# Patient Record
Sex: MALE | Race: WHITE | NOT HISPANIC OR LATINO | Employment: OTHER | URBAN - METROPOLITAN AREA
[De-identification: names, ages, dates, MRNs, and addresses within clinical notes are randomized per-mention and may not be internally consistent; named-entity substitution may affect disease eponyms.]

---

## 2017-08-16 ENCOUNTER — ALLSCRIPTS OFFICE VISIT (OUTPATIENT)
Dept: OTHER | Facility: OTHER | Age: 77
End: 2017-08-16

## 2018-01-13 VITALS
WEIGHT: 238 LBS | BODY MASS INDEX: 32.23 KG/M2 | SYSTOLIC BLOOD PRESSURE: 120 MMHG | DIASTOLIC BLOOD PRESSURE: 70 MMHG | HEART RATE: 54 BPM | HEIGHT: 72 IN

## 2018-08-15 ENCOUNTER — OFFICE VISIT (OUTPATIENT)
Dept: CARDIOLOGY CLINIC | Facility: CLINIC | Age: 78
End: 2018-08-15
Payer: MEDICARE

## 2018-08-15 VITALS
DIASTOLIC BLOOD PRESSURE: 76 MMHG | WEIGHT: 245.6 LBS | SYSTOLIC BLOOD PRESSURE: 148 MMHG | HEIGHT: 72 IN | HEART RATE: 74 BPM | BODY MASS INDEX: 33.26 KG/M2

## 2018-08-15 DIAGNOSIS — I10 ESSENTIAL (PRIMARY) HYPERTENSION: Primary | ICD-10-CM

## 2018-08-15 DIAGNOSIS — I25.10 CORONARY ARTERIOSCLEROSIS: ICD-10-CM

## 2018-08-15 DIAGNOSIS — E78.00 PURE HYPERCHOLESTEROLEMIA: ICD-10-CM

## 2018-08-15 PROCEDURE — 99214 OFFICE O/P EST MOD 30 MIN: CPT | Performed by: INTERNAL MEDICINE

## 2018-08-15 PROCEDURE — 93000 ELECTROCARDIOGRAM COMPLETE: CPT | Performed by: INTERNAL MEDICINE

## 2018-08-15 RX ORDER — UMECLIDINIUM BROMIDE AND VILANTEROL TRIFENATATE 62.5; 25 UG/1; UG/1
POWDER RESPIRATORY (INHALATION)
Refills: 0 | COMMUNITY
Start: 2018-07-05

## 2018-08-15 NOTE — PROGRESS NOTES
Cardiology Follow Up    Deborah Mojica  1940  [id number]  [address] [facility]  [address] [place] [address]  [phone]    1  Essential (primary) hypertension  POCT ECG   2  Pure hypercholesterolemia     3  Coronary arteriosclerosis         Interval History:  The patient is here for a follow-up visit  He was last seen by me in August last year  His most recent echocardiogram was done in September last year and demonstrated preserved LV systolic function with mild LVH and mild biatrial cavity enlargement  Patient has a history of an inferior wall myocardial infarction in 2008 with recanalization of the vessel at that time and placement of two drug-eluting stents  Stress test done November 2013 demonstrated no evidence of prognostically important ischemia  Patient's blood pressure today is 148/76 with a pulse of 74  EKG demonstrates sinus rhythm with a right bundle branch block and left anterior hemiblock  The left anterior hemiblock is new compared to an EKG done August 3, 2016  He has been feeling well  He has had no chest pain or significant dyspnea  There is no problem list on file for this patient  Past Medical History:   Diagnosis Date    Arthritis     Cardiac disease     heart attck 10 years ago    COPD [facility]chronic obstructive pulmonary disease) ([facility])      Social History     Social History    Marital status: Single     Spouse name: N/A    Number of children: N/A    Years of education: N/A     Occupational History    Not on file  Social History Main Topics    Smoking status: Former Smoker    Smokeless tobacco: Not on file    Alcohol use Yes    Drug use: No    Sexual activity: Not on file     Other Topics Concern    Not on file     Social History Narrative    No narrative on file      No family history on file    Past Surgical History:   Procedure Laterality Date    CARDIAC SURGERY stent placement    EYE SURGERY      bilateral cataract surgery    VASCULAR SURGERY      legs       Current Outpatient Prescriptions:     ANORO ELLIPTA 62 5-25 MCG/INH inhaler, INHALE 1 PUFF BY MOUTH ONCE A DAY - RINSE MOUTH AFTER USE, Disp: , Rfl: 0    aspirin 325 mg tablet, Take 325 mg by mouth daily  , Disp: , Rfl:     ezetimibe (ZETIA) 10 mg tablet, Take 10 mg by mouth 3 (three) times a week , Disp: , Rfl:     Fish Oil-Krill Oil (KRILL OIL PLUS) CAPS, Take by mouth, Disp: , Rfl:     metoprolol tartrate (LOPRESSOR) 25 mg tablet, Take 25 mg by mouth daily  , Disp: , Rfl:     TiZANidine (ZANAFLEX) 2 MG capsule, Take 2 mg by mouth 2 (two) times a day , Disp: , Rfl:   Allergies   Allergen Reactions    Niacin Other (See Comments)       Labs:not applicable  Imaging: No results found  Review of Systems:  Review of Systems   All other systems reviewed and are negative  Physical Exam:  Physical Exam   Constitutional: He is oriented to person, place, and time  He appears well-developed and well-nourished  HENT:   Head: Normocephalic and atraumatic  Eyes: Conjunctivae are normal  Pupils are equal, round, and reactive to light  Neck: Normal range of motion  Neck supple  Cardiovascular: Normal rate and normal heart sounds  Pulmonary/Chest: Effort normal and breath sounds normal    Neurological: He is alert and oriented to person, place, and time  Skin: Skin is warm and dry  Psychiatric: He has a normal mood and affect  Vitals reviewed  Discussion/Summary:I will continue the patient's present medical regimen  Patient appears well compensated  I have asked the patient to call if there is a problem in the interim otherwise I will see the patient in one years time

## 2018-08-27 ENCOUNTER — APPOINTMENT (EMERGENCY)
Dept: RADIOLOGY | Facility: HOSPITAL | Age: 78
DRG: 872 | End: 2018-08-27
Payer: MEDICARE

## 2018-08-27 ENCOUNTER — HOSPITAL ENCOUNTER (INPATIENT)
Facility: HOSPITAL | Age: 78
LOS: 2 days | Discharge: HOME | DRG: 872 | End: 2018-08-29
Attending: EMERGENCY MEDICINE | Admitting: INTERNAL MEDICINE
Payer: MEDICARE

## 2018-08-27 DIAGNOSIS — A41.9 SEVERE SEPSIS (HCC): Primary | ICD-10-CM

## 2018-08-27 DIAGNOSIS — R65.20 SEVERE SEPSIS (HCC): Primary | ICD-10-CM

## 2018-08-27 DIAGNOSIS — Z87.09 H/O ASBESTOSIS: ICD-10-CM

## 2018-08-27 PROBLEM — E78.49 OTHER HYPERLIPIDEMIA: Status: ACTIVE | Noted: 2018-08-27

## 2018-08-27 PROBLEM — J44.9 COPD (CHRONIC OBSTRUCTIVE PULMONARY DISEASE) (HCC): Status: ACTIVE | Noted: 2018-08-27

## 2018-08-27 PROBLEM — E78.00 PURE HYPERCHOLESTEROLEMIA: Status: ACTIVE | Noted: 2018-08-27

## 2018-08-27 PROBLEM — I25.10 ASCVD (ARTERIOSCLEROTIC CARDIOVASCULAR DISEASE): Status: ACTIVE | Noted: 2018-08-27

## 2018-08-27 PROBLEM — I10 HYPERTENSION: Status: ACTIVE | Noted: 2018-08-27

## 2018-08-27 LAB
ALBUMIN SERPL BCP-MCNC: 3.3 G/DL (ref 3.5–5)
ALP SERPL-CCNC: 113 U/L (ref 46–116)
ALT SERPL W P-5'-P-CCNC: 59 U/L (ref 12–78)
ANION GAP SERPL CALCULATED.3IONS-SCNC: 8 MMOL/L (ref 4–13)
APTT PPP: 31 SECONDS (ref 24–36)
AST SERPL W P-5'-P-CCNC: 31 U/L (ref 5–45)
BACTERIA UR QL AUTO: ABNORMAL /HPF
BASOPHILS # BLD AUTO: 0.02 THOUSANDS/ΜL (ref 0–0.1)
BASOPHILS NFR BLD AUTO: 0 % (ref 0–1)
BILIRUB SERPL-MCNC: 0.7 MG/DL (ref 0.2–1)
BILIRUB UR QL STRIP: NEGATIVE
BUN SERPL-MCNC: 24 MG/DL (ref 5–25)
CALCIUM SERPL-MCNC: 9 MG/DL (ref 8.3–10.1)
CHLORIDE SERPL-SCNC: 101 MMOL/L (ref 100–108)
CLARITY UR: ABNORMAL
CO2 SERPL-SCNC: 25 MMOL/L (ref 21–32)
COARSE GRAN CASTS URNS QL MICRO: ABNORMAL /LPF
COLOR UR: ABNORMAL
CREAT SERPL-MCNC: 1.6 MG/DL (ref 0.6–1.3)
EOSINOPHIL # BLD AUTO: 0.14 THOUSAND/ΜL (ref 0–0.61)
EOSINOPHIL NFR BLD AUTO: 2 % (ref 0–6)
ERYTHROCYTE [DISTWIDTH] IN BLOOD BY AUTOMATED COUNT: 13.6 % (ref 11.6–15.1)
FINE GRAN CASTS URNS QL MICRO: ABNORMAL /LPF
GFR SERPL CREATININE-BSD FRML MDRD: 41 ML/MIN/1.73SQ M
GLUCOSE SERPL-MCNC: 151 MG/DL (ref 65–140)
GLUCOSE UR STRIP-MCNC: NEGATIVE MG/DL
HCT VFR BLD AUTO: 42.5 % (ref 36.5–49.3)
HGB BLD-MCNC: 14.2 G/DL (ref 12–17)
HGB UR QL STRIP.AUTO: NEGATIVE
HYALINE CASTS #/AREA URNS LPF: ABNORMAL /LPF
IMM GRANULOCYTES # BLD AUTO: 0.01 THOUSAND/UL (ref 0–0.2)
IMM GRANULOCYTES NFR BLD AUTO: 0 % (ref 0–2)
INR PPP: 1.24 (ref 0.86–1.17)
KETONES UR STRIP-MCNC: ABNORMAL MG/DL
LACTATE SERPL-SCNC: 1.7 MMOL/L (ref 0.5–2)
LACTATE SERPL-SCNC: 2.6 MMOL/L (ref 0.5–2)
LEUKOCYTE ESTERASE UR QL STRIP: NEGATIVE
LYMPHOCYTES # BLD AUTO: 0.92 THOUSANDS/ΜL (ref 0.6–4.47)
LYMPHOCYTES NFR BLD AUTO: 15 % (ref 14–44)
MCH RBC QN AUTO: 30.7 PG (ref 26.8–34.3)
MCHC RBC AUTO-ENTMCNC: 33.4 G/DL (ref 31.4–37.4)
MCV RBC AUTO: 92 FL (ref 82–98)
MONOCYTES # BLD AUTO: 0.96 THOUSAND/ΜL (ref 0.17–1.22)
MONOCYTES NFR BLD AUTO: 16 % (ref 4–12)
MUCOUS THREADS UR QL AUTO: ABNORMAL
NEUTROPHILS # BLD AUTO: 4.05 THOUSANDS/ΜL (ref 1.85–7.62)
NEUTS SEG NFR BLD AUTO: 67 % (ref 43–75)
NITRITE UR QL STRIP: NEGATIVE
NON-SQ EPI CELLS URNS QL MICRO: ABNORMAL /HPF
NT-PROBNP SERPL-MCNC: 836 PG/ML
PH UR STRIP.AUTO: 5.5 [PH] (ref 4.5–8)
PLATELET # BLD AUTO: 241 THOUSANDS/UL (ref 149–390)
PMV BLD AUTO: 8.7 FL (ref 8.9–12.7)
POTASSIUM SERPL-SCNC: 4.6 MMOL/L (ref 3.5–5.3)
PROT SERPL-MCNC: 7.7 G/DL (ref 6.4–8.2)
PROT UR STRIP-MCNC: ABNORMAL MG/DL
PROTHROMBIN TIME: 14.9 SECONDS (ref 11.8–14.2)
RBC # BLD AUTO: 4.62 MILLION/UL (ref 3.88–5.62)
RBC #/AREA URNS AUTO: ABNORMAL /HPF
SODIUM SERPL-SCNC: 134 MMOL/L (ref 136–145)
SP GR UR STRIP.AUTO: >=1.03 (ref 1–1.03)
TROPONIN I SERPL-MCNC: 0.04 NG/ML
UROBILINOGEN UR QL STRIP.AUTO: 1 E.U./DL
WBC # BLD AUTO: 6.1 THOUSAND/UL (ref 4.31–10.16)
WBC #/AREA URNS AUTO: ABNORMAL /HPF

## 2018-08-27 PROCEDURE — 94664 DEMO&/EVAL PT USE INHALER: CPT

## 2018-08-27 PROCEDURE — 94760 N-INVAS EAR/PLS OXIMETRY 1: CPT

## 2018-08-27 PROCEDURE — 83605 ASSAY OF LACTIC ACID: CPT | Performed by: PHYSICIAN ASSISTANT

## 2018-08-27 PROCEDURE — 85610 PROTHROMBIN TIME: CPT | Performed by: PHYSICIAN ASSISTANT

## 2018-08-27 PROCEDURE — 85730 THROMBOPLASTIN TIME PARTIAL: CPT | Performed by: PHYSICIAN ASSISTANT

## 2018-08-27 PROCEDURE — 83880 ASSAY OF NATRIURETIC PEPTIDE: CPT | Performed by: PHYSICIAN ASSISTANT

## 2018-08-27 PROCEDURE — 93005 ELECTROCARDIOGRAM TRACING: CPT

## 2018-08-27 PROCEDURE — 85025 COMPLETE CBC W/AUTO DIFF WBC: CPT | Performed by: PHYSICIAN ASSISTANT

## 2018-08-27 PROCEDURE — 84484 ASSAY OF TROPONIN QUANT: CPT | Performed by: PHYSICIAN ASSISTANT

## 2018-08-27 PROCEDURE — 94640 AIRWAY INHALATION TREATMENT: CPT

## 2018-08-27 PROCEDURE — 87040 BLOOD CULTURE FOR BACTERIA: CPT | Performed by: PHYSICIAN ASSISTANT

## 2018-08-27 PROCEDURE — 71046 X-RAY EXAM CHEST 2 VIEWS: CPT

## 2018-08-27 PROCEDURE — 81001 URINALYSIS AUTO W/SCOPE: CPT | Performed by: PHYSICIAN ASSISTANT

## 2018-08-27 PROCEDURE — 80053 COMPREHEN METABOLIC PANEL: CPT | Performed by: PHYSICIAN ASSISTANT

## 2018-08-27 PROCEDURE — 36415 COLL VENOUS BLD VENIPUNCTURE: CPT | Performed by: PHYSICIAN ASSISTANT

## 2018-08-27 PROCEDURE — 99285 EMERGENCY DEPT VISIT HI MDM: CPT

## 2018-08-27 PROCEDURE — 99223 1ST HOSP IP/OBS HIGH 75: CPT | Performed by: INTERNAL MEDICINE

## 2018-08-27 RX ORDER — ONDANSETRON 2 MG/ML
4 INJECTION INTRAMUSCULAR; INTRAVENOUS ONCE
Status: DISCONTINUED | OUTPATIENT
Start: 2018-08-27 | End: 2018-08-29 | Stop reason: HOSPADM

## 2018-08-27 RX ORDER — ACETAMINOPHEN 325 MG/1
650 TABLET ORAL EVERY 6 HOURS PRN
Status: DISCONTINUED | OUTPATIENT
Start: 2018-08-27 | End: 2018-08-29 | Stop reason: HOSPADM

## 2018-08-27 RX ORDER — FLUTICASONE FUROATE AND VILANTEROL 100; 25 UG/1; UG/1
1 POWDER RESPIRATORY (INHALATION) DAILY
Status: DISCONTINUED | OUTPATIENT
Start: 2018-08-28 | End: 2018-08-28

## 2018-08-27 RX ORDER — TEMAZEPAM 15 MG/1
15 CAPSULE ORAL
Status: DISCONTINUED | OUTPATIENT
Start: 2018-08-27 | End: 2018-08-29 | Stop reason: HOSPADM

## 2018-08-27 RX ORDER — ASPIRIN 325 MG
325 TABLET ORAL DAILY
Status: DISCONTINUED | OUTPATIENT
Start: 2018-08-28 | End: 2018-08-29 | Stop reason: HOSPADM

## 2018-08-27 RX ORDER — EZETIMIBE 10 MG/1
10 TABLET ORAL 3 TIMES WEEKLY
Status: DISCONTINUED | OUTPATIENT
Start: 2018-08-27 | End: 2018-08-29 | Stop reason: HOSPADM

## 2018-08-27 RX ORDER — SODIUM CHLORIDE 9 MG/ML
125 INJECTION, SOLUTION INTRAVENOUS CONTINUOUS
Status: DISCONTINUED | OUTPATIENT
Start: 2018-08-27 | End: 2018-08-28

## 2018-08-27 RX ADMIN — IPRATROPIUM BROMIDE 0.5 MG: 0.5 SOLUTION RESPIRATORY (INHALATION) at 17:22

## 2018-08-27 RX ADMIN — ALBUTEROL SULFATE 5 MG: 2.5 SOLUTION RESPIRATORY (INHALATION) at 17:22

## 2018-08-27 RX ADMIN — SODIUM CHLORIDE 1000 ML: 0.9 INJECTION, SOLUTION INTRAVENOUS at 17:45

## 2018-08-27 RX ADMIN — EZETIMIBE 10 MG: 10 TABLET ORAL at 20:47

## 2018-08-27 RX ADMIN — SODIUM CHLORIDE 125 ML/HR: 0.9 INJECTION, SOLUTION INTRAVENOUS at 20:15

## 2018-08-27 RX ADMIN — CEFEPIME HYDROCHLORIDE 2000 MG: 2 INJECTION, SOLUTION INTRAVENOUS at 18:17

## 2018-08-27 RX ADMIN — TEMAZEPAM 15 MG: 15 CAPSULE ORAL at 20:47

## 2018-08-27 RX ADMIN — SODIUM CHLORIDE 1000 ML: 0.9 INJECTION, SOLUTION INTRAVENOUS at 18:47

## 2018-08-27 NOTE — ED PROVIDER NOTES
History  Chief Complaint   Patient presents with    Fever - 75 years or older     Patient presents to the Er stating he started with nausea, fever, and SOB last night   Shortness of Breath       65 yo M with history of COPD, MI x 10 years ago with stent, h/o asbestosis, who presents today for evaluation of fever  States 4 days ago he began to feel generally weak and tired  On Saturday evening he began to feel nauseous  He went to his PCP and had a fever of 101 9 and his PCP advised he come to the ED for evaluation  He went home, took 2 tylenol and came to the ED  His family at bedside reports he is more SOB but patient denies any coughing, SOB, chest pain or tightness  He denies headache, dizziness, lightheadedness, abdominal pain, vomiting, diarrhea, dysuria, frequency, or urgency  He lives at home  Denies sick contacts  No recent travel  No recent hospitalizations  He has no other complaints at this time  Prior to Admission Medications   Prescriptions Last Dose Informant Patient Reported? Taking? ANORO ELLIPTA 62 5-25 MCG/INH inhaler   Yes No   Sig: INHALE 1 PUFF BY MOUTH ONCE A DAY - RINSE MOUTH AFTER USE   Fish Oil-Krill Oil (KRILL OIL PLUS) CAPS   Yes No   Sig: Take by mouth   aspirin 325 mg tablet   Yes No   Sig: Take 325 mg by mouth daily  ezetimibe (ZETIA) 10 mg tablet   Yes No   Sig: Take 10 mg by mouth 3 (three) times a week  metoprolol tartrate (LOPRESSOR) 25 mg tablet  Self Yes No   Sig: Take 25 mg by mouth daily        Facility-Administered Medications: None       Past Medical History:   Diagnosis Date    Arthritis     Cardiac disease     heart attck 10 years ago    COPD (chronic obstructive pulmonary disease) ([facility] [address] )        Past Surgical History:   Procedure Laterality Date    CARDIAC SURGERY      stent placement    EYE SURGERY      bilateral cataract surgery    VASCULAR SURGERY      legs       History reviewed  No pertinent family history    I have reviewed and agree with the history as documented  Social History   Substance Use Topics    Smoking status: Former Smoker    Smokeless tobacco: Never Used    Alcohol use Yes      Comment: occasional        Review of Systems   Constitutional: Positive for fatigue and fever  Negative for chills  HENT: Negative for congestion, ear discharge, ear pain, rhinorrhea and sore throat  Respiratory: Positive for shortness of breath  Negative for cough and wheezing  Cardiovascular: Negative for chest pain and leg swelling  Gastrointestinal: Positive for nausea  Negative for abdominal pain, diarrhea and vomiting  Genitourinary: Negative for dysuria, frequency, hematuria and urgency  Skin: Negative for rash  Neurological: Negative for dizziness, weakness, light-headedness, numbness and headaches  Physical Exam  Physical Exam   Constitutional: He is oriented to person, place, and time  He appears well-developed and well-nourished  Non-toxic appearance  He does not have a sickly appearance  He does not appear ill  No distress  Able to speak in short phrases   HENT:   Head: Normocephalic and atraumatic  Right Ear: Hearing and external ear normal    Left Ear: Hearing and external ear normal    Nose: Nose normal    Mouth/Throat: Oropharynx is clear and moist    Eyes: Conjunctivae and EOM are normal  Pupils are equal, round, and reactive to light  Neck: Normal range of motion  Neck supple  Cardiovascular: Normal rate, regular rhythm and normal heart sounds  Exam reveals no gallop and no friction rub  No murmur heard  Pulmonary/Chest: Effort normal  No respiratory distress  He has decreased breath sounds (throughout)  He has wheezes (end expiratory, bases)  He has no rhonchi  He has no rales  Abdominal: Soft  Normal appearance  There is no tenderness  Musculoskeletal: Normal range of motion  Right lower leg: He exhibits no swelling and no edema  Left lower leg: He exhibits no swelling and no edema  Chronic venous stasis changes bilaterally to lower extremities without cellulitis   Neurological: He is alert and oriented to person, place, and time  Gait normal  GCS eye subscore is 4  GCS verbal subscore is 5  GCS motor subscore is 6  Skin: Skin is warm and dry  Capillary refill takes less than 2 seconds  No rash noted  He is not diaphoretic  Psychiatric: He has a normal mood and affect  Nursing note and vitals reviewed        Vital Signs  ED Triage Vitals [08/27/18 1500]   Temperature Pulse Respirations Blood Pressure SpO2   (!) 97 3 °F (36 3 °C) 85 (!) 24 112/63 93 %      Temp Source Heart Rate Source Patient Position - Orthostatic VS BP Location FiO2 (%)   Temporal Monitor Sitting Right arm --      Pain Score       No Pain           Vitals:    08/27/18 1800 08/27/18 1815 08/27/18 1830 08/1940   BP: 126/65 116/59 111/55 163/75   Pulse: 96 93 89 91   Patient Position - Orthostatic VS:           Visual Acuity      ED Medications  Medications   aspirin tablet 325 mg (not administered)   ezetimibe (ZETIA) tablet 10 mg (10 mg Oral Given 8/27/18 2047)   metoprolol tartrate (LOPRESSOR) tablet 25 mg (not administered)   sodium chloride 0 9 % infusion (125 mL/hr Intravenous New Bag 8/27/18 2015)   enoxaparin (LOVENOX) subcutaneous injection 40 mg (not administered)   cefepime (MAXIPIME) IVPB (premix) 1,000 mg (not administered)   acetaminophen (TYLENOL) tablet 650 mg (not administered)   ondansetron (ZOFRAN) injection 4 mg (4 mg Intravenous Not Given 8/27/18 2048)   temazepam (RESTORIL) capsule 15 mg (15 mg Oral Given 8/27/18 2047)   fluticasone-vilanterol (BREO ELLIPTA) 100-25 mcg/inh inhaler 1 puff (not administered)   albuterol inhalation solution 5 mg (5 mg Nebulization Given 8/27/18 1722)   ipratropium (ATROVENT) 0 02 % inhalation solution 0 5 mg (0 5 mg Nebulization Given 8/27/18 1722)   sodium chloride 0 9 % bolus 1,000 mL (0 mL Intravenous Stopped 8/27/18 1846)   sodium chloride 0 9 % bolus 1,000 mL (1,000 mL Intravenous New Bag 8/27/18 1847)   cefepime (MAXIPIME) 2 g/50 mL dextrose IVPB (0 mg Intravenous Stopped 8/27/18 1847)       Diagnostic Studies  Results Reviewed     Procedure Component Value Units Date/Time    Lactic acid x2 [[id number]]  (Normal) Collected:  08/27/18 1913    Lab Status:  Final result Specimen:  Blood from Arm, Right Updated:  08/1940     LACTIC ACID 1 7 mmol/L     Narrative:         Result may be elevated if tourniquet was used during collection  Urine Microscopic [[id number]]  (Abnormal) Collected:  08/27/18 1812    Lab Status:  Final result Specimen:  Urine from Urine, Clean Catch Updated:  08/27/18 1828     RBC, UA 2-4 (A) /hpf      WBC, UA 4-10 (A) /hpf      Epithelial Cells Moderate (A) /hpf      Bacteria, UA Occasional /hpf      Hyaline Casts, UA 30-50 (A) /lpf      Fine granular casts 1-2 /lpf      COARSE GRANULAR CASTS 0-3 /lpf      MUCOUS THREADS Innumerable (A)    UA w Reflex to Microscopic w Reflex to Culture [[id number]]  (Abnormal) Collected:  08/27/18 1812    Lab Status:  Final result Specimen:  Urine from Urine, Clean Catch Updated:  08/27/18 1823     Color, UA Nickie     Clarity, UA Hazy     Specific Gravity, UA >=1 030     pH, UA 5 5     Leukocytes, UA Negative     Nitrite, UA Negative     Protein,  (2+) (A) mg/dl      Glucose, UA Negative mg/dl      Ketones, UA Trace (A) mg/dl      Urobilinogen, UA 1 0 E U /dl      Bilirubin, UA Negative     Blood, UA Negative    B-type natriuretic peptide [[id number]]  (Abnormal) Collected:  08/27/18 1701    Lab Status:  Final result Specimen:  Blood from Arm, Left Updated:  08/27/18 1815     NT-proBNP 836 (H) pg/mL     Lactic acid x2 [[id number]]  (Abnormal) Collected:  08/27/18 1701    Lab Status:  Final result Specimen:  Blood from Arm, Left Updated:  08/27/18 1742     LACTIC ACID 2 6 (HH) mmol/L     Narrative:         Result may be elevated if tourniquet was used during collection      Comprehensive metabolic panel [[id number]] (Abnormal) Collected:  08/27/18 1701    Lab Status:  Final result Specimen:  Blood from Arm, Left Updated:  08/27/18 1732     Sodium 134 (L) mmol/L      Potassium 4 6 mmol/L      Chloride 101 mmol/L      CO2 25 mmol/L      ANION GAP 8 mmol/L      BUN 24 mg/dL      Creatinine 1 60 (H) mg/dL      Glucose 151 (H) mg/dL      Calcium 9 0 mg/dL      AST 31 U/L      ALT 59 U/L      Alkaline Phosphatase 113 U/L      Total Protein 7 7 g/dL      Albumin 3 3 (L) g/dL      Total Bilirubin 0 70 mg/dL      eGFR 41 ml/min/1 73sq m     Narrative:         National Kidney Disease Education Program recommendations are as follows:  GFR calculation is accurate only with a steady state creatinine  Chronic Kidney disease less than 60 ml/min/1 73 sq  meters  Kidney failure less than 15 ml/min/1 73 sq  meters      Troponin I [[id number]]  (Normal) Collected:  08/27/18 1701    Lab Status:  Final result Specimen:  Blood from Arm, Left Updated:  08/27/18 1732     Troponin I 0 04 ng/mL     APTT [[id number]]  (Normal) Collected:  08/27/18 1701    Lab Status:  Final result Specimen:  Blood from Arm, Left Updated:  08/27/18 1729     PTT 31 seconds     Protime-INR [[id number]]  (Abnormal) Collected:  08/27/18 1701    Lab Status:  Final result Specimen:  Blood from Arm, Left Updated:  08/27/18 1729     Protime 14 9 (H) seconds      INR 1 24 (H)    CBC and differential [[id number]]  (Abnormal) Collected:  08/27/18 1701    Lab Status:  Final result Specimen:  Blood from Arm, Left Updated:  08/27/18 1714     WBC 6 10 Thousand/uL      RBC 4 62 Million/uL      Hemoglobin 14 2 g/dL      Hematocrit 42 5 %      MCV 92 fL      MCH 30 7 pg      MCHC 33 4 g/dL      RDW 13 6 %      MPV 8 7 (L) fL      Platelets 910 Thousands/uL      Neutrophils Relative 67 %      Immat GRANS % 0 %      Lymphocytes Relative 15 %      Monocytes Relative 16 (H) %      Eosinophils Relative 2 %      Basophils Relative 0 %      Neutrophils Absolute 4 05 Thousands/µL      Immature Grans Absolute 0 01 Thousand/uL      Lymphocytes Absolute 0 92 Thousands/µL      Monocytes Absolute 0 96 Thousand/µL      Eosinophils Absolute 0 14 Thousand/µL      Basophils Absolute 0 02 Thousands/µL     Blood culture #1 [[id number]] Collected:  08/27/18 1701    Lab Status: In process Specimen:  Blood from Arm, Left Updated:  08/27/18 1709    Blood culture #2 [[id number]] Collected:  08/27/18 1701    Lab Status: In process Specimen:  Blood from Arm, Left Updated:  08/27/18 1709                 XR chest 2 views   Final Result by Nick Alas MD (08/27 1755)      No acute cardiopulmonary disease  Stable bilateral calcified pleural plaques which could obscures underlying airspace disease  Workstation performed: [id number]                    Procedures  ECG 12 Lead Documentation  Date/Time: 8/27/2018 5:04 PM  Performed by: José Santoyo  Authorized by: José Santoyo     Indications / Diagnosis:  Fever, SOB  ECG reviewed by me, the ED Provider: yes (Also ED attending)    Patient location:  ED  Previous ECG:     Previous ECG:  Compared to current    Comparison ECG info:  2/10/16, sinus bradycardia, RBBB    Similarity:  No change  Interpretation:     Interpretation: normal    Rate:     ECG rate:  95    ECG rate assessment: normal    Rhythm:     Rhythm: sinus rhythm    QRS:     QRS intervals: Wide (RBBB)  Conduction:     Conduction: normal    ST segments:     ST segments:  Normal  T waves:     T waves: normal               Phone Contacts  ED Phone Contact    ED Course  ED Course as of Aug 27 2052   Mon Aug 27, 2018   1749 Sepsis alert called                          Initial Sepsis Screening     Row Name 08/27/18 1700                Is the patient's history suggestive of a new or worsening infection? (!)  Yes (Proceed)  -GR        Suspected source of infection suspect infection, source unknown  -GR        Are two or more of the following signs & symptoms of infection both present and new to the patient?         Indicate SIRS criteria Tachycardia > 90 bpm;Hyperthemia > 38 3C (100 9F)   reports temp to 101 9 at home  -GR        If the answer is yes to both questions, suspicion of sepsis is present          If severe sepsis is present AND tissue hypoperfusion perists in the hour after fluid resuscitation or lactate > 4, the patient meets criteria for SEPTIC SHOCK          Are any of the following organ dysfunction criteria present within 6 hours of suspected infection and SIRS criteria that are NOT considered to be chronic conditions? (!)  Yes  -GR        Organ dysfunction Lactate > 2 0 mmol/L  -        Date of presentation of severe sepsis 08/27/18  -        Time of presentation of severe sepsis 1749  -GR        Tissue hypoperfusion persists in the hour after crystalloid fluid administration, evidenced, by either:          Was hypotension present within one hour of the conclusion of crystalloid fluid administration? No  -GR        Date of presentation of septic shock          Time of presentation of septic shock            User Key  (r) = Recorded By, (t) = Taken By, (c) = Cosigned By    Initials Name Provider Type     Laverne Wright PA-C Physician Assistant           [facility] (last 720 hours)      Sepsis Reassess     Row Name 08/27/18 1942                   Repeat Volume Status and Tissue Perfusion Assessment Performed    Repeat Volume Status and Tissue Perfusion Assessment Performed Yes  -AH           Volume Status and Tissue Perfusion Post Fluid Resuscitation- Must Document 5 of the Following 7:    Vital Signs Reviewed (HR, RR, BP, T) Yes  -AH        Arterial Oxygen Saturation Reviewed (POx, SaO2 or SpO2) Yes (comment %)  -        Cardio Normal S1/S2; Regular rate and rhythm; No murmor; No rub or gallop  -        Pulmonary Normal effort;Clear to auscultation  -        Capillary Refill Brisk  -        Peripheral Pulses Radial;Dorsalis Pedis; Posterior Tibialis  -AH Peripheral Pulse +2  -AH        Dorsalis Pedis +2  -AH        Posterior Tibialis +2  -AH        Skin Warm;Dry  -AH        Urine output assessed Adequate  -AH           *OR*   Intensive Monitoring- Must Document One of the Following Four *:    Vital Signs Reviewed          * Central Venous Pressure (CVP or RAP)          * Central Venous Oxygen (SVO2, ScvO2 or Oxygen saturation via central catheter)          * Bedside Cardiovascular US in IVC diameter and % collapse          * Passive Leg Raise OR Crystalloid Challenge            User Key  (r) = Recorded By, (t) = Taken By, (c) = Cosigned By    Initials Name Provider Type    [address] [place] [address], PA-C Physician Assistant                MDM  Number of Diagnoses or Management Options  H/O asbestosis: new and requires workup  Severe sepsis [facility]): new and requires workup  Diagnosis management comments:   65 yo M here for evaluation of fevers, nausea, worsening SOB  Seen by PCP with temp of 101 9 and advised to come to ED  Mild expiratory wheezing throughout but no hypoxia  Labwork significant for elevated lactate and given his history of fevers with tachycardia a sepsis alert was called  Source unknown but likely pulmonary  CXR with with stable bilateral pleural plaques c/w known asbestosis  Patient given fluids and started on cefepime per SLIM  He was admitted to [facility] for further evaluation of sepsis         Amount and/or Complexity of Data Reviewed  Clinical lab tests: ordered and reviewed  Tests in the radiology section of CPT®: ordered and reviewed  Independent visualization of images, tracings, or specimens: yes    Patient Progress  Patient progress: stable    CritCare Time    Disposition  Final diagnoses:   Severe sepsis ([address] )   H/O asbestosis     Time reflects when diagnosis was documented in both MDM as applicable and the Disposition within this note     Time User Action Codes Description Comment    8/27/2018  6:09 PM Lilia Ross Add [A49 9, U88 20] Severe sepsis ([facility] [address] )     8/27/2018  6:09 PM Denise Ross Add [Z87 09] H/O asbestosis       ED Disposition     ED Disposition Condition Comment    Admit  Case was discussed with BELA and the patient's admission status was agreed to be Admission Status: inpatient status to the service of Dr Yanely Martinez   Follow-up Information    None         Current Discharge Medication List      CONTINUE these medications which have NOT CHANGED    Details   ANORO ELLIPTA 62 5-25 MCG/INH inhaler INHALE 1 PUFF BY MOUTH ONCE A DAY - RINSE MOUTH AFTER USE  Refills: 0      aspirin 325 mg tablet Take 325 mg by mouth daily  ezetimibe (ZETIA) 10 mg tablet Take 10 mg by mouth 3 (three) times a week  Fish Oil-Krill Oil (KRILL OIL PLUS) CAPS Take by mouth      metoprolol tartrate (LOPRESSOR) 25 mg tablet Take 25 mg by mouth daily             No discharge procedures on file      ED Provider  Electronically Signed by           Magda Acevedo PA-C  08/27/18 8837

## 2018-08-27 NOTE — ED NOTES
Scattered rhonchi and wheezes noted  Patient states he has a cough       Richardson Amaya RN  08/27/18 7798

## 2018-08-27 NOTE — H&P
History and Physical - Ulysses Gauze 66 y o  male MRN: [id number]  Unit/Bed#: ED 03 Encounter: [id number]    Assessment/Plan     Assessment:  Principal Problem:    Severe sepsis ([place] [address] )  Active Problems:    H/O asbestosis    ASCVD (arteriosclerotic cardiovascular disease)    Hypertension    Pure hypercholesterolemia    COPD (chronic obstructive pulmonary disease) ([place] [address] )      Plan:  Admit step-down unit  Greater than 2 midnight stay  Cultures have been ordered  Will initially give 2 L of fluid and recheck lactic acid level  Continue monitor blood pressure  Empiric cefepime, still need to collect urine    Hold vaso active medications        History of Present Illness   HPI: Ulysses Gauze is a 66y o  year old male who presents with fever and chills  Patient started with malaise 3 days ago  Two nights ago the patient had nausea but no vomiting  He has some vague abdominal discomfort  This resolved yesterday  This morning he developed fever and chills and was went to his family doctor's office and had a temperature 101 9°  He was sent directly to the emergency room  He does have perhaps slight increased cough  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 and some mild COPD  He has no nausea or vomiting today he denies any urinary symptoms  There is no skin rashes or lesions or tick exposure  Patient be admitted the [facility] stabilization    Review of Systems   Constitutional: Positive for appetite change and fatigue (For few weeks)  HENT: Negative  Respiratory: Positive for cough  Cardiovascular:        Remote inferior wall MI and revascularization   Gastrointestinal: Negative  Per HPI   Genitourinary: Negative          Historical Information   Past Medical History:   Diagnosis Date    Arthritis     Cardiac disease     heart attck 10 years ago    COPD (chronic obstructive pulmonary disease) ([place] [address] )      Past Surgical History:   Procedure Laterality Date    CARDIAC SURGERY      stent placement    EYE SURGERY      bilateral cataract surgery    VASCULAR SURGERY      legs     Social History   History   Alcohol Use    Yes     Comment: occasional     History   Drug Use No     History   Smoking Status    Former Smoker   Smokeless Tobacco    Never Used     Family History: History reviewed  No pertinent family history  Meds/Allergies      Current Facility-Administered Medications   Medication Dose Route Frequency    cefepime (MAXIPIME) 2 g/50 mL dextrose IVPB  2,000 mg Intravenous Once    sodium chloride 0 9 % bolus 1,000 mL  1,000 mL Intravenous Once    sodium chloride 0 9 % bolus 1,000 mL  1,000 mL Intravenous Once         (Not in a hospital admission)  Allergies   Allergen Reactions    Niacin Other (See Comments)       Objective   Vitals: Blood pressure 126/65, pulse 96, temperature (!) 97 3 °F (36 3 °C), temperature source Temporal, resp  rate (!) 24, height 6' (1 829 m), weight 109 kg (240 lb), SpO2 95 %  No intake or output data in the 24 hours ending 08/27/18 1809  Invasive Devices     Peripheral Intravenous Line            Peripheral IV 02/10/16 Left Hand 929 days    Peripheral IV 08/27/18 Left Wrist less than 1 day                Physical Exam   Constitutional: He is oriented to person, place, and time  He appears well-developed and well-nourished  HENT:   Head: Normocephalic and atraumatic  Left Ear: External ear normal    Mouth/Throat: No oropharyngeal exudate  Eyes: Pupils are equal, round, and reactive to light  Neck: No JVD present  No tracheal deviation present  Cardiovascular: Normal rate and regular rhythm  Pulmonary/Chest:   Few rhonchi   Abdominal: Soft  Bowel sounds are normal    Musculoskeletal: Normal range of motion  He exhibits edema (Plus one)  Venous stasis changes no obvious cellulitis   Neurological: He is alert and oriented to person, place, and time  No cranial nerve deficit  He exhibits normal muscle tone  Skin: Skin is warm and dry     Vitals reviewed  Lab Results:   Admission on 08/27/2018   Component Date Value    WBC 08/27/2018 6 10     RBC 08/27/2018 4 62     Hemoglobin 08/27/2018 14 2     Hematocrit 08/27/2018 42 5     MCV 08/27/2018 92     MCH 08/27/2018 30 7     MCHC 08/27/2018 33 4     RDW 08/27/2018 13 6     MPV 08/27/2018 8 7*    Platelets 94/79/3781 241     Neutrophils Relative 08/27/2018 67     Immat GRANS % 08/27/2018 0     Lymphocytes Relative 08/27/2018 15     Monocytes Relative 08/27/2018 16*    Eosinophils Relative 08/27/2018 2     Basophils Relative 08/27/2018 0     Neutrophils Absolute 08/27/2018 4 05     Immature Grans Absolute 08/27/2018 0 01     Lymphocytes Absolute 08/27/2018 0 92     Monocytes Absolute 08/27/2018 0 96     Eosinophils Absolute 08/27/2018 0 14     Basophils Absolute 08/27/2018 0 02     Sodium 08/27/2018 134*    Potassium 08/27/2018 4 6     Chloride 08/27/2018 101     CO2 08/27/2018 25     ANION GAP 08/27/2018 8     BUN 08/27/2018 24     Creatinine 08/27/2018 1 60*    Glucose 08/27/2018 151*    Calcium 08/27/2018 9 0     AST 08/27/2018 31     ALT 08/27/2018 59     Alkaline Phosphatase 08/27/2018 113     Total Protein 08/27/2018 7 7     Albumin 08/27/2018 3 3*    Total Bilirubin 08/27/2018 0 70     eGFR 08/27/2018 41     LACTIC ACID 08/27/2018 2 6*    Protime 08/27/2018 14 9*    INR 08/27/2018 1 24*    PTT 08/27/2018 31     Troponin I 08/27/2018 0 04      Imaging Studies: I have personally reviewed pertinent reports  EKG, Pathology, and Other Studies: I have personally reviewed pertinent reports  VTE  Prophylaxis: Algorithmic determination of appropriate prophylaxis determined  This note has been constructed using a voice recognition system         Kalpana Roberts MD

## 2018-08-27 NOTE — SEPSIS NOTE
[facility]    Sepsis Note   Zeb Landaverde 66 y o  male MRN: [id number]  Unit/Bed#: -01 Encounter: [id number]            Initial Sepsis Screening     [address] Name 08/27/18 1700                Is the patient's history suggestive of a new or worsening infection? (!)  Yes (Proceed)  -GR        Suspected source of infection suspect infection, source unknown  -GR        Are two or more of the following signs & symptoms of infection both present and new to the patient?         Indicate SIRS criteria Tachycardia > 90 bpm;Hyperthemia > 38 3C (100 9F)   reports temp to 101 9 at home  -GR        If the answer is yes to both questions, suspicion of sepsis is present          If severe sepsis is present AND tissue hypoperfusion perists in the hour after fluid resuscitation or lactate > 4, the patient meets criteria for SEPTIC SHOCK          Are any of the following organ dysfunction criteria present within 6 hours of suspected infection and SIRS criteria that are NOT considered to be chronic conditions? (!)  Yes  -GR        Organ dysfunction Lactate > 2 0 mmol/L  -GR        Date of presentation of severe sepsis 08/27/18  -GR        Time of presentation of severe sepsis 1749  -GR        Tissue hypoperfusion persists in the hour after crystalloid fluid administration, evidenced, by either:          Was hypotension present within one hour of the conclusion of crystalloid fluid administration?  No  -GR        Date of presentation of septic shock          Time of presentation of septic shock            User Key  (r) = Recorded By, (t) = Taken By, (c) = Cosigned By    Initials Name Provider Type     Yanira Trevino PA-C Physician Assistant               [facility] (last 720 hours)      Sepsis Reassess     Row Name 08/27/18 1942                   Repeat Volume Status and Tissue Perfusion Assessment Performed    Repeat Volume Status and Tissue Perfusion Assessment Performed Yes  - Volume Status and Tissue Perfusion Post Fluid Resuscitation- Must Document 5 of the Following 7:    Vital Signs Reviewed (HR, RR, BP, T) Yes  -        Arterial Oxygen Saturation Reviewed (POx, SaO2 or SpO2) Yes (comment %)  -        Cardio Normal S1/S2; Regular rate and rhythm; No murmor; No rub or gallop  -        Pulmonary Normal effort;Clear to auscultation  -        Capillary Refill Brisk  -        Peripheral Pulses Radial;Dorsalis Pedis; Posterior Tibialis  -        Peripheral Pulse +2  -AH        Dorsalis Pedis +2  -AH        Posterior Tibialis +2  -AH        Skin Warm;Dry  -        Urine output assessed Adequate  -           *OR*   Intensive Monitoring- Must Document One of the Following Four *:    Vital Signs Reviewed          * Central Venous Pressure (CVP or RAP)          * Central Venous Oxygen (SVO2, ScvO2 or Oxygen saturation via central catheter)          * Bedside Cardiovascular US in IVC diameter and % collapse          * Passive Leg Raise OR Crystalloid Challenge            User Key  (r) = Recorded By, (t) = Taken By, (c) = Cosigned By    Initials Name Provider Type    [address] [place], [place] [address]HARRY Physician Assistant

## 2018-08-28 ENCOUNTER — APPOINTMENT (INPATIENT)
Dept: RADIOLOGY | Facility: HOSPITAL | Age: 78
DRG: 872 | End: 2018-08-28
Payer: MEDICARE

## 2018-08-28 PROBLEM — N17.9 AKI (ACUTE KIDNEY INJURY) (HCC): Status: ACTIVE | Noted: 2018-08-28

## 2018-08-28 PROBLEM — E87.1 HYPONATREMIA: Status: ACTIVE | Noted: 2018-08-28

## 2018-08-28 LAB
ANION GAP SERPL CALCULATED.3IONS-SCNC: 4 MMOL/L (ref 4–13)
ATRIAL RATE: 95 BPM
BUN SERPL-MCNC: 17 MG/DL (ref 5–25)
CALCIUM SERPL-MCNC: 8.2 MG/DL (ref 8.3–10.1)
CHLORIDE SERPL-SCNC: 104 MMOL/L (ref 100–108)
CO2 SERPL-SCNC: 26 MMOL/L (ref 21–32)
CREAT SERPL-MCNC: 1.1 MG/DL (ref 0.6–1.3)
ERYTHROCYTE [DISTWIDTH] IN BLOOD BY AUTOMATED COUNT: 13.1 % (ref 11.6–15.1)
GFR SERPL CREATININE-BSD FRML MDRD: 64 ML/MIN/1.73SQ M
GLUCOSE SERPL-MCNC: 105 MG/DL (ref 65–140)
HCT VFR BLD AUTO: 38.6 % (ref 36.5–49.3)
HGB BLD-MCNC: 12.2 G/DL (ref 12–17)
MCH RBC QN AUTO: 30 PG (ref 26.8–34.3)
MCHC RBC AUTO-ENTMCNC: 31.6 G/DL (ref 31.4–37.4)
MCV RBC AUTO: 95 FL (ref 82–98)
P AXIS: 60 DEGREES
PLATELET # BLD AUTO: 188 THOUSANDS/UL (ref 149–390)
PLATELET # BLD AUTO: 222 THOUSANDS/UL (ref 149–390)
PMV BLD AUTO: 8.5 FL (ref 8.9–12.7)
PMV BLD AUTO: 8.9 FL (ref 8.9–12.7)
POTASSIUM SERPL-SCNC: 4.5 MMOL/L (ref 3.5–5.3)
PR INTERVAL: 140 MS
PROCALCITONIN SERPL-MCNC: 0.14 NG/ML
QRS AXIS: -54 DEGREES
QRSD INTERVAL: 130 MS
QT INTERVAL: 374 MS
QTC INTERVAL: 469 MS
RBC # BLD AUTO: 4.07 MILLION/UL (ref 3.88–5.62)
SODIUM SERPL-SCNC: 134 MMOL/L (ref 136–145)
T WAVE AXIS: 9 DEGREES
VENTRICULAR RATE: 95 BPM
WBC # BLD AUTO: 4.95 THOUSAND/UL (ref 4.31–10.16)

## 2018-08-28 PROCEDURE — 99232 SBSQ HOSP IP/OBS MODERATE 35: CPT | Performed by: INTERNAL MEDICINE

## 2018-08-28 PROCEDURE — 93010 ELECTROCARDIOGRAM REPORT: CPT | Performed by: INTERNAL MEDICINE

## 2018-08-28 PROCEDURE — 71046 X-RAY EXAM CHEST 2 VIEWS: CPT

## 2018-08-28 PROCEDURE — 85027 COMPLETE CBC AUTOMATED: CPT | Performed by: INTERNAL MEDICINE

## 2018-08-28 PROCEDURE — 85049 AUTOMATED PLATELET COUNT: CPT | Performed by: INTERNAL MEDICINE

## 2018-08-28 PROCEDURE — 94760 N-INVAS EAR/PLS OXIMETRY 1: CPT

## 2018-08-28 PROCEDURE — 94640 AIRWAY INHALATION TREATMENT: CPT

## 2018-08-28 PROCEDURE — 84145 PROCALCITONIN (PCT): CPT | Performed by: INTERNAL MEDICINE

## 2018-08-28 PROCEDURE — 80048 BASIC METABOLIC PNL TOTAL CA: CPT | Performed by: INTERNAL MEDICINE

## 2018-08-28 RX ORDER — FLUTICASONE FUROATE AND VILANTEROL 100; 25 UG/1; UG/1
1 POWDER RESPIRATORY (INHALATION) DAILY
Status: DISCONTINUED | OUTPATIENT
Start: 2018-08-28 | End: 2018-08-29 | Stop reason: HOSPADM

## 2018-08-28 RX ADMIN — SODIUM CHLORIDE 125 ML/HR: 0.9 INJECTION, SOLUTION INTRAVENOUS at 03:40

## 2018-08-28 RX ADMIN — TEMAZEPAM 15 MG: 15 CAPSULE ORAL at 22:49

## 2018-08-28 RX ADMIN — ENOXAPARIN SODIUM 40 MG: 100 INJECTION SUBCUTANEOUS at 09:53

## 2018-08-28 RX ADMIN — METOPROLOL TARTRATE 25 MG: 25 TABLET ORAL at 09:53

## 2018-08-28 RX ADMIN — ASPIRIN 325 MG ORAL TABLET 325 MG: 325 PILL ORAL at 09:53

## 2018-08-28 RX ADMIN — CEFEPIME HYDROCHLORIDE 1000 MG: 1 INJECTION, SOLUTION INTRAVENOUS at 06:30

## 2018-08-28 RX ADMIN — TIOTROPIUM BROMIDE 18 MCG: 18 CAPSULE ORAL; RESPIRATORY (INHALATION) at 10:17

## 2018-08-28 RX ADMIN — CEFEPIME HYDROCHLORIDE 1000 MG: 1 INJECTION, SOLUTION INTRAVENOUS at 18:14

## 2018-08-28 RX ADMIN — FLUTICASONE FUROATE AND VILANTEROL TRIFENATATE 1 PUFF: 100; 25 POWDER RESPIRATORY (INHALATION) at 10:16

## 2018-08-28 NOTE — PROGRESS NOTES
Patient had Acute kidney injury on admission due to sepsis evidenced by Cr 1 60; 1 10 treated with NSS boluses and infusion

## 2018-08-28 NOTE — ASSESSMENT & PLAN NOTE
Patient claims that he  has chronic expiratory wheezing, will add Spiriva to his Breo and will continue albuterol nebulizers    Should wheezing increase will start IV Solu-Medrol

## 2018-08-28 NOTE — ASSESSMENT & PLAN NOTE
The source for patient's severe sepsis includes possible gastrointestinal viral infection, acute bronchitis, blood culture pending to rule out bacteremia  Lactic acidosis resolved after IV hydration  Patient has no nausea only complains of cough  Will repeat chest x-ray to see if any new infiltrates developed compared to yesterday  Will stop IV fluids and will ambulate the patient  Will transfer him to [facility]

## 2018-08-28 NOTE — CASE MANAGEMENT
Initial Clinical Review    Admission: Date/Time/Statement: 8/27/18 @ 1813  Orders Placed This Encounter   Procedures    Inpatient Admission (expected length of stay for this patient is greater than two midnights)     Standing Status:   Standing     Number of Occurrences:   1     Order Specific Question:   Admitting Physician     Answer:   Rebecca Guerrero [[id number]]     Order Specific Question:   Level of Care     Answer:   Level 1 Stepdown [13]     Order Specific Question:   Estimated length of stay     Answer:   More than 2 Midnights     Order Specific Question:   Certification     Answer:   I certify that inpatient services are medically necessary for this patient for a duration of greater than two midnights  See H&P and MD Progress Notes for additional information about the patient's course of treatment  ED: Date/Time/Mode of Arrival:   ED Arrival Information     Expected Arrival Acuity Means of Arrival Escorted By Service Admission Type    - 8/27/2018 14:35 Emergent Walk-In [facility] General Medicine Emergency    Arrival Complaint    fever        Chief Complaint:   Chief Complaint   Patient presents with    Fever - 75 years or older     Patient presents to the Er stating he started with nausea, fever, and SOB last night   Shortness of Breath     History of Illness:   Aramis Rodriguez is a 66y o  year old male who presents with fever and chills  Patient started with malaise 3 days ago  Two nights ago the patient had nausea but no vomiting  He has some vague abdominal discomfort  This resolved yesterday  This morning he developed fever and chills and was went to his family doctor's office and had a temperature 101 9°  He was sent directly to the emergency room       ED Vital Signs:   ED Triage Vitals [08/27/18 1500]   Temperature Pulse Respirations Blood Pressure SpO2   (!) 97 3 °F (36 3 °C) 85 (!) 24 112/63 93 %      Temp Source Heart Rate Source Patient Position - Orthostatic VS BP Location FiO2 (%) Temporal Monitor Sitting Right arm --      Pain Score       No Pain        Wt Readings from Last 1 Encounters:   18 109 kg (239 lb 6 7 oz)     Abnormal Labs/Diagnostic Test Results:    WBC 2018 6 10     RBC 2018 4 62     Hemoglobin 2018 14 2     Hematocrit 2018 42 5       Sodium 2018 134*    Potassium 2018 4 6     Chloride 2018 101     CO2 2018 25     ANION GAP 2018 8     BUN 2018 24     Creatinine 2018 1 60*    Glucose 2018 151*    Calcium 2018 9 0     AST 2018 31     ALT 2018 59     Alkaline Phosphatase 2018 113     Total Protein 2018 7 7     Albumin 2018 3 3*    Total Bilirubin 2018 0 70     eGFR 2018 41     LACTIC ACID 2018 2 6*    Protime 2018 14 9*    INR 2018 1 24*    PTT 2018 31     Troponin I 2018 0 04          Color, UA Nickie       Clarity, UA Hazy       Specific Lamar, UA >=1 030       pH, UA 5 5       Leukocytes, UA Negative       Nitrite, UA Negative       Protein,  (2+) (A) mg/dl         Glucose, UA Negative mg/dl         Ketones, UA Trace (A) mg/dl         Urobilinogen, UA 1 0 E U /dl         Bilirubin, UA Negative       Blood, UA Negative      NT-proBNP 836 (H) pg/mL     LACTIC ACID 2 6 (HH) mmol/L     Troponin I 0 04 ng/mL     Blood Cult: pending    Chest X:  No acute cardiopulmonary disease  Stable bilateral calcified pleural plaques which could obscures underlying airspace disease      EC, NSR    ED Treatment:   Medication Administration from 2018 1435 to 2018 1937       Date/Time Order Dose Route Action Action by Comments     2018 1722 albuterol inhalation solution 5 mg 5 mg Nebulization Given Cindy Seeds, RN      2018 1722 ipratropium (ATROVENT) 0 02 % inhalation solution 0 5 mg 0 5 mg Nebulization Given Cindy Seeds, RN      2018 1846 sodium chloride 0 9 % bolus 1,000 mL 0 mL Intravenous Stopped Quynh Ramirez RN      08/27/2018 1745 sodium chloride 0 9 % bolus 1,000 mL 1,000 mL Intravenous New Bag Dat Vasquez RN      08/27/2018 1847 sodium chloride 0 9 % bolus 1,000 mL 1,000 mL Intravenous [address] Quynh Ramirez RN      08/27/2018 1847 cefepime (MAXIPIME) 2 g/50 mL dextrose IVPB 0 mg Intravenous Stopped Quynh Ramirez RN      08/27/2018 1817 cefepime (MAXIPIME) 2 g/50 mL dextrose IVPB 2,000 mg Intravenous New Bag Dat Vasquez RN           Past Medical/Surgical History:   Past Medical History:   Diagnosis Date    Arthritis     Cardiac disease     COPD (chronic obstructive pulmonary disease) ([facility] )        Admitting Diagnosis: H/O asbestosis [Z87 09]  Fever in adult [R50 9]  Severe sepsis ([facility] ) [A41 9, R65 20]    Age/Sex: 66 y o  male    Assessment/Plan:   Assessment:  Principal Problem:    Severe sepsis ([facility] )  Active Problems:    H/O asbestosis    ASCVD (arteriosclerotic cardiovascular disease)    Hypertension    Pure hypercholesterolemia    COPD (chronic obstructive pulmonary disease) ([facility])      Plan:  Admit step-down unit  Greater than 2 midnight stay      Cultures have been ordered  Will initially give 2 L of fluid and recheck lactic acid level  Continue monitor blood pressure    Empiric cefepime, still need to collect urine     Hold vaso active medications    Admission Orders:  Scheduled Meds:   Current Facility-Administered Medications:  acetaminophen 650 mg Oral Q6H PRN Kalpana Roberts MD   aspirin 325 mg Oral Daily Kalpana Roberts MD   cefepime 1,000 mg Intravenous Q12H Kalpana Roberts MD   enoxaparin 40 mg Subcutaneous Daily Kalpana Roberts MD   ezetimibe 10 mg Oral Once per day on Mon Wed Fri Kalpana Roberts MD   fluticasone-vilanterol 1 puff Inhalation Daily Kalpana Roberts MD   metoprolol tartrate 25 mg Oral Daily Kalpana Roberts MD   ondansetron 4 mg Intravenous Once Kalpana Roberts MD   temazepam 15 mg Oral HS PRN Kalpana Roberts MD   tiotropium 18 mcg Inhalation Daily John Yost Amrit Andino MD

## 2018-08-28 NOTE — PROGRESS NOTES
Progress Note - Margie Ta 1940, 66 y o  male MRN: [id number]    Unit/Bed#: -01 Encounter: [id number]    Primary Care Provider: ERICA Fofana   Date and time admitted to hospital: 8/27/2018  4:27 PM        * Severe sepsis [facility])   Assessment & Plan    The source for patient's severe sepsis includes possible gastrointestinal viral infection, acute bronchitis, blood culture pending to rule out bacteremia  Lactic acidosis resolved after IV hydration  Patient has no nausea only complains of cough  Will repeat chest x-ray to see if any new infiltrates developed compared to yesterday  Will stop IV fluids and will ambulate the patient  [facility]  COPD (chronic obstructive pulmonary disease) ([facility] [address] )   Assessment & Plan    Patient claims that he  has chronic expiratory wheezing, will add Spiriva to his Breo and will continue albuterol nebulizers  Should wheezing increase will start IV Solu-Medrol        Essential hypertension   Assessment & Plan    Stable on metoprolol        ASCVD (arteriosclerotic cardiovascular disease)   Assessment & Plan    Will continue aspirin, denies chest pain            VTE Prophylaxis: in place    Patient Centered Rounds: I rounded with patient's nurse    Current Length of Stay: 1 day(s)    Current Patient Status: Inpatient    Certification Statement: Pt requires additional inpatient hospital stay due to: see assessment and plan        Subjective:   Patient developed nausea without vomiting, abdominal pain or diarrhea or fever last Saturday  Got better by Sunday bone nausea which was very mild persistent on Monday that is why he went to see his family doctor who documented a fever 101 9 in the office and sent patient to the ER for evaluation  Patient only complains of a dry cough and mild chronic expiratory wheezing this morning    The cough is a bit more than yesterday is nonproductive is not associated with chest pain, pleurisy, shortness of breath at rest, lower extremity edema, weight gain  Patient denies any dysuria, frequency, urgency, difficulty urinating, signs of bleeding  All other ROS are negative    Objective:     Vitals:   Temp (24hrs), Av 8 °F (36 6 °C), Min:97 3 °F (36 3 °C), Max:98 5 °F (36 9 °C)    HR:  [68-96] 68  Resp:  [17-26] 17  BP: ()/(51-79) 144/79  SpO2:  [93 %-98 %] 96 %  Body mass index is 32 47 kg/m²  Input and Output Summary (last 24 hours): Intake/Output Summary (Last 24 hours) at 18 0817  Last data filed at 18 0600   Gross per 24 hour   Intake          1977 08 ml   Output              700 ml   Net          1277 08 ml       Physical Exam:     Physical Exam   Constitutional: He is oriented to person, place, and time  He appears well-developed  No distress  HENT:   Head: Normocephalic  Mouth/Throat: Oropharynx is clear and moist    Eyes: Conjunctivae are normal    Neck: Neck supple  No JVD present  Cardiovascular: Normal rate and regular rhythm  Pulmonary/Chest: Effort normal  No respiratory distress  He has wheezes (Mild bilateral expiratory wheezing is present)  He has no rales  Abdominal: Soft  Bowel sounds are normal  He exhibits no distension  There is no tenderness  Musculoskeletal: He exhibits no tenderness  Lymphadenopathy:     He has no cervical adenopathy  Neurological: He is alert and oriented to person, place, and time  No cranial nerve deficit  Skin: Skin is warm and dry  No rash noted  Psychiatric: He has a normal mood and affect  Vitals reviewed  I personally reviewed labs and imaging reports for today        Last 24 Hours Medication List:     Current Facility-Administered Medications:  acetaminophen 650 mg Oral Q6H PRN Zi Ch MD   aspirin 325 mg Oral Daily Zi Ch MD   cefepime 1,000 mg Intravenous Q12H Zi Ch MD   enoxaparin 40 mg Subcutaneous Daily Zi Ch MD   ezetimibe 10 mg Oral Once per day on  Taylor Mera MD   fluticasone-vilanterol 1 puff Inhalation Daily Chen Roberto MD   metoprolol tartrate 25 mg Oral Daily Chen Roberto MD   ondansetron 4 mg Intravenous Once Chen Roberto MD   temazepam 15 mg Oral HS PRN Chen Roberto MD   tiotropium 18 mcg Inhalation Daily Ignacio Bowens MD          Today, Patient Was Seen By: Ignacio Bowens MD    ** Please Note: Dictation voice to text software may have been used in the creation of this document   **

## 2018-08-28 NOTE — PLAN OF CARE
DISCHARGE PLANNING - CARE MANAGEMENT     Discharge to post-acute care or home with appropriate resources Progressing        INFECTION - ADULT     Absence or prevention of progression during hospitalization Progressing        PAIN - ADULT     Verbalizes/displays adequate comfort level or baseline comfort level Progressing        Potential for Falls     Patient will remain free of falls Progressing        SKIN/TISSUE INTEGRITY - ADULT     Skin integrity remains intact Progressing

## 2018-08-28 NOTE — SOCIAL WORK
Met with Pt  Pt presents AA&Ox3  Pt's dtr and dtr-in-law at bedside  [facility]  Pt is independent with adls and ambulation  Pt uses cane for distance  Pt drives and goes grocery shopping  Pt's dtr-in-law lives close by  [facility] [place]  Pt has not had any VNA services in past  Pt does not think he will need any VNA services upon discharge  Will follow

## 2018-08-28 NOTE — PHYSICIAN ADVISOR
Current patient class: Inpatient  The patient is currently on Hospital Day: 2 at [place] [address]      The patient was admitted to the hospital at [phone] on 8/27/18 for the following diagnosis:  H/O asbestosis [Z87 09]  Fever in adult [R50 9]  Severe sepsis ([facility] [address] ) [A41 9, R65 20]       There is documentation in the medical record of an expected length of stay of at least 2 midnights  The patient is therefore expected to satisfy the 2 midnight benchmark and given the 2 midnight presumption is appropriate for INPATIENT ADMISSION  Given this expectation of a satisfying stay, CMS instructs us that the patient is most often appropriate for inpatient admission under part A provided medical necessity is documented in the chart  After review of the relevant documentation, labs, vital signs and test results, the patient is appropriate for INPATIENT ADMISSION  Admission to the hospital as an inpatient is a complex decision making process which requires the practitioner to consider the patients presenting complaint, history and physical examination and all relevant testing  With this in mind, in this case, the patient was deemed appropriate for INPATIENT ADMISSION  After review of the documentation and testing available at the time of the admission I concur with this clinical determination of medical necessity  Rationale is as follows: The patient is a 66 yrs old Male who presented to the ED at 8/27/2018  4:27 PM with a chief complaint of Fever - 75 years or older (Patient presents to the Er stating he started with nausea, fever, and SOB last night ) and Shortness of Breath     The patient presented with fevers and chills  The patient had gone to his PCP's office and had a fever of 101 9 degrees and was sent to the ER  Cr is 1 6 and lactic acid is 2 6  This patient is being admitted with severe sepsis   The plan of care includes IVF, re-check lactic acid level, urine and blood cultures, IV abx  Recheck labs  This patient is appropriate for INPATIENT admission; continued hospitalization is necessary to ensure stabilization of his clinical status in this patient admitted with severe sepsis         The patients vitals on arrival were ED Triage Vitals [08/27/18 1500]   Temperature Pulse Respirations Blood Pressure SpO2   (!) 97 3 °F (36 3 °C) 85 (!) 24 112/63 93 %      Temp Source Heart Rate Source Patient Position - Orthostatic VS BP Location FiO2 (%)   Temporal Monitor Sitting Right arm --      Pain Score       No Pain           Past Medical History:   Diagnosis Date    Arthritis     Cardiac disease     heart attck 10 years ago    COPD (chronic obstructive pulmonary disease) (HCC)      Past Surgical History:   Procedure Laterality Date    CARDIAC SURGERY      stent placement    EYE SURGERY      bilateral cataract surgery    VASCULAR SURGERY      legs           Consults have been placed to:   None    Vitals:    08/27/18 2122 08/27/18 2241 08/28/18 0400 08/28/18 0559   BP:  133/63 148/65 144/79   BP Location:       Pulse:  75 75 68   Resp:  18 17 17   Temp:  98 5 °F (36 9 °C) 98 2 °F (36 8 °C) 97 6 °F (36 4 °C)   TempSrc:  Oral Oral    SpO2: 97% 94% 95% 96%   Weight:    109 kg (239 lb 6 7 oz)   Height:           Most recent labs:    Recent Labs      08/27/18   1701  08/28/18   0448   WBC  6 10  4 95   HGB  14 2  12 2   HCT  42 5  38 6   PLT  241  188   K  4 6  4 5   NA  134*  134*   CALCIUM  9 0  8 2*   BUN  24  17   CREATININE  1 60*  1 10   INR  1 24*   --    TROPONINI  0 04   --    AST  31   --    ALT  59   --    ALKPHOS  113   --        Scheduled Meds:  Current Facility-Administered Medications:  acetaminophen 650 mg Oral Q6H PRN Cosme Kim MD   aspirin 325 mg Oral Daily Csome Kim MD   cefepime 1,000 mg Intravenous Q12H Cosme Kim MD   enoxaparin 40 mg Subcutaneous Daily Cosme Kim MD   ezetimibe 10 mg Oral Once per day on Mon Wed Fri Cosme Kim MD   fluticasone-vilanterol 1 puff Inhalation Daily Mary Silva MD   metoprolol tartrate 25 mg Oral Daily Mary Silva MD   ondansetron 4 mg Intravenous Once Mary Silva MD   temazepam 15 mg Oral HS PRN Mary Silva MD   tiotropium 18 mcg Inhalation Daily Yanira Aleman MD     Continuous Infusions:   PRN Meds:   acetaminophen    temazepam    Surgical procedures (if appropriate):

## 2018-08-28 NOTE — RESPIRATORY THERAPY NOTE
RT Protocol Note  Lyle Cummings 66 y o  male MRN: [id number]  Unit/Bed#: -01 Encounter: [id number]    Assessment    Principal Problem:    Severe sepsis ([facility] )  Active Problems:    H/O asbestosis    ASCVD (arteriosclerotic cardiovascular disease)    Essential hypertension    Other hyperlipidemia    COPD (chronic obstructive pulmonary disease) ([facility])      Home Pulmonary Medications:  Anoro Ellipta 1p Daily       Past Medical History:   Diagnosis Date    Arthritis     Cardiac disease     heart attck 10 years ago    COPD (chronic obstructive pulmonary disease) ([facility] )      Social History     Social History    Marital status: Single     Spouse name: N/A    Number of children: N/A    Years of education: N/A     Social History Main Topics    Smoking status: Former Smoker    Smokeless tobacco: Never Used    Alcohol use Yes      Comment: occasional    Drug use: No    Sexual activity: Not Asked     Other Topics Concern    None     Social History Narrative    None       Subjective         Objective    Physical Exam:   Assessment Type: Assess only  General Appearance: Alert  Respiratory Pattern: Normal  Chest Assessment: Chest expansion symmetrical  Bilateral Breath Sounds: Diminished, Clear  Cough: None    Vitals:  Blood pressure 163/75, pulse 91, temperature 97 6 °F (36 4 °C), temperature source Oral, resp  rate 18, height 6' (1 829 m), weight 109 kg (240 lb 4 8 oz), SpO2 97 %  Imaging and other studies: I have personally reviewed pertinent reports              Plan    Respiratory Plan: Home Bronchodilator Patient pathway

## 2018-08-29 VITALS
HEIGHT: 72 IN | DIASTOLIC BLOOD PRESSURE: 76 MMHG | RESPIRATION RATE: 18 BRPM | WEIGHT: 240.3 LBS | TEMPERATURE: 97.7 F | BODY MASS INDEX: 32.55 KG/M2 | SYSTOLIC BLOOD PRESSURE: 134 MMHG | HEART RATE: 68 BPM | OXYGEN SATURATION: 96 %

## 2018-08-29 PROCEDURE — 94760 N-INVAS EAR/PLS OXIMETRY 1: CPT

## 2018-08-29 PROCEDURE — 99238 HOSP IP/OBS DSCHRG MGMT 30/<: CPT | Performed by: INTERNAL MEDICINE

## 2018-08-29 PROCEDURE — 94640 AIRWAY INHALATION TREATMENT: CPT

## 2018-08-29 RX ORDER — CEFUROXIME AXETIL 500 MG/1
500 TABLET ORAL EVERY 12 HOURS SCHEDULED
Qty: 6 TABLET | Refills: 0 | Status: SHIPPED | OUTPATIENT
Start: 2018-08-29 | End: 2018-09-01

## 2018-08-29 RX ADMIN — FLUTICASONE FUROATE AND VILANTEROL TRIFENATATE 1 PUFF: 100; 25 POWDER RESPIRATORY (INHALATION) at 09:38

## 2018-08-29 RX ADMIN — ENOXAPARIN SODIUM 40 MG: 100 INJECTION SUBCUTANEOUS at 08:02

## 2018-08-29 RX ADMIN — EZETIMIBE 10 MG: 10 TABLET ORAL at 08:01

## 2018-08-29 RX ADMIN — TIOTROPIUM BROMIDE 18 MCG: 18 CAPSULE ORAL; RESPIRATORY (INHALATION) at 09:38

## 2018-08-29 RX ADMIN — CEFEPIME HYDROCHLORIDE 1000 MG: 1 INJECTION, SOLUTION INTRAVENOUS at 06:12

## 2018-08-29 RX ADMIN — ASPIRIN 325 MG ORAL TABLET 325 MG: 325 PILL ORAL at 08:02

## 2018-08-29 RX ADMIN — METOPROLOL TARTRATE 25 MG: 25 TABLET ORAL at 08:01

## 2018-08-29 NOTE — ASSESSMENT & PLAN NOTE
Patient admitted with severe sepsis which was most likely due to a viral gastrointestinal infection or acute bronchitis  Chest x-ray showed no focal infiltrates this pneumonia, patient had no nausea diarrhea after being hydrated with IV fluids  On day of discharge lungs were clear to auscultation without wheezing heart was regular rhythm and abdomen was soft and nontender  Patient discharged stable condition home with instructions to complete 3 more days of p o  Ceftin therapy and to follow up with his family doctor within 1 week

## 2018-08-29 NOTE — DISCHARGE SUMMARY
Discharge- Darvin Resendiz 1940, 66 y o  male MRN: [id number]    Unit/Bed#: [address] [place] [address] Encounter: [id number]    Primary Care Provider: ERICA Heaton   Date and time admitted to hospital: 8/27/2018  4:27 PM        * Severe sepsis [facility])   Assessment & Plan    Patient admitted with severe sepsis which was most likely due to a viral gastrointestinal infection or acute bronchitis  Chest x-ray showed no focal infiltrates this pneumonia, patient had no nausea diarrhea after being hydrated with IV fluids  On day of discharge lungs were clear to auscultation without wheezing heart was regular rhythm and abdomen was soft and nontender  Patient discharged stable condition home with instructions to complete 3 more days of p o  Ceftin therapy and to follow up with his family doctor within 1 week  COPD (chronic obstructive pulmonary disease) ([facility] [address] )   Assessment & Plan    Patient had no expiratory wheezing on day of discharge, will continue his inhalers outpatient        Essential hypertension   Assessment & Plan    Stable on metoprolol        LAKSHMI (acute kidney injury) [facility])   Assessment & Plan    Patient had acute kidney injury admission in the setting of severe sepsis, most likely due to dehydration  His renal function returned to the baseline with IV hydration  The creatinine is 1 1 on discharge        Hyponatremia   Assessment & Plan    Patient had mild hyponatremia with sodium 134                  Hospital Course:     Darvin Resendiz is a 66 y o  male patient who originally presented to the hospital on   Admission Orders     Ordered        08/27/18 1813  Inpatient Admission (expected length of stay for this patient is greater than two midnights)  Once            due to severe sepsis    Please see above list of diagnoses and related plan for additional information       Condition at Discharge:  good      Discharge instructions/Information to patient and family:   See after visit summary for information provided to patient and family  Provisions for Follow-Up Care:  See after visit summary for information related to follow-up care and any pertinent home health orders  Disposition:     Home       Discharge Statement:  I spent 25 minutes discharging the patient  This time was spent on the day of discharge  I had direct contact with the patient on the day of discharge  Greater than 50% of the total time was spent examining patient, answering all patient questions, arranging and discussing plan of care with patient as well as directly providing post-discharge instructions  Additional time then spent on discharge activities  Discharge Medications:  See after visit summary for reconciled discharge medications provided to patient and family        ** Please Note: This note has been constructed using a voice recognition system **

## 2018-08-29 NOTE — ASSESSMENT & PLAN NOTE
Patient had acute kidney injury admission in the setting of severe sepsis, most likely due to dehydration  His renal function returned to the baseline with IV hydration    The creatinine is 1 1 on discharge

## 2018-09-01 LAB
BACTERIA BLD CULT: NORMAL
BACTERIA BLD CULT: NORMAL

## 2019-08-22 NOTE — PROGRESS NOTES
Cardiology Follow Up    Irving Course  1940  [id number]  [address] [facility]  [address]  [phone]    1  Essential (primary) hypertension  POCT ECG    Echo complete with contrast if indicated   2  Pure hypercholesterolemia  Echo complete with contrast if indicated   3  Coronary arteriosclerosis  Echo complete with contrast if indicated       Interval History:  Patient is here for a follow-up visit  He was last seen by me in August of last year  His most recent echocardiogram was done in September 2017 and demonstrated preserved LV systolic function with mild LVH and mild biatrial cavity enlargement  Patient has a history of an inferior wall myocardial infarction in 2008 with recanalization of the vessel at that time and placement of two drug-eluting stents  Stress test done November 2013 demonstrated no evidence of prognostically important ischemia  EKG today demonstrates sinus rhythm with a right bundle branch block with no significant change compared to a prior tracing done 08/15/2018  Patient's blood pressure is mildly elevated today  He does tell me his blood pressure has been well controlled in other settings  He apparently had a DVT in the right leg requiring Xarelto earlier in the year but now he is off of this  He has had no chest pain or significant dyspnea      Patient Active Problem List   Diagnosis    Severe sepsis ([facility] [address] )   Sisi Garcia H/O asbestosis    ASCVD (arteriosclerotic cardiovascular disease)    Essential hypertension    Other hyperlipidemia    COPD (chronic obstructive pulmonary disease) (HCC)    Hyponatremia    LAKSHMI (acute kidney injury) ([address] )     Past Medical History:   Diagnosis Date    Arthritis     Cardiac disease     heart attck 10 years ago    COPD (chronic obstructive pulmonary disease) ([facility] [address] )      Social History     Socioeconomic History    Marital status: Single     Spouse name: Not on file    Number of children: Not on file    Years of education: Not on file    Highest education level: Not on file   Occupational History    Not on file   Social Needs    Financial resource strain: Not on file    Food insecurity:     Worry: Not on file     Inability: Not on file    Transportation needs:     Medical: Not on file     Non-medical: Not on file   Tobacco Use    Smoking status: Former Smoker    Smokeless tobacco: Never Used   Substance and Sexual Activity    Alcohol use: Yes     Comment: occasional    Drug use: No    Sexual activity: Not on file   Lifestyle    Physical activity:     Days per week: Not on file     Minutes per session: Not on file    Stress: Not on file   Relationships    Social connections:     Talks on phone: Not on file     Gets together: Not on file     Attends Mandaen service: Not on file     Active member of club or organization: Not on file     Attends meetings of clubs or organizations: Not on file     Relationship status: Not on file    Intimate partner violence:     Fear of current or ex partner: Not on file     Emotionally abused: Not on file     Physically abused: Not on file     Forced sexual activity: Not on file   Other Topics Concern    Not on file   Social History Narrative    Not on file      No family history on file    Past Surgical History:   Procedure Laterality Date    CARDIAC SURGERY      stent placement    EYE SURGERY      bilateral cataract surgery    VASCULAR SURGERY      legs       Current Outpatient Medications:     ANORO ELLIPTA 62 5-25 MCG/INH inhaler, INHALE 1 PUFF BY MOUTH ONCE A DAY - RINSE MOUTH AFTER USE, Disp: , Rfl: 0    ezetimibe (ZETIA) 10 mg tablet, Take 10 mg by mouth 3 (three) times a week , Disp: , Rfl:     Fish Oil-Krill Oil (KRILL OIL PLUS) CAPS, Take by mouth, Disp: , Rfl:     metoprolol tartrate (LOPRESSOR) 25 mg tablet, Take 25 mg by mouth daily  , Disp: , Rfl:     aspirin 325 mg tablet, Take 325 mg by mouth daily  , Disp: , Rfl:   Allergies   Allergen Reactions    Niacin Other (See Comments)       Labs:not applicable  Imaging: No results found  Review of Systems:  Review of Systems   All other systems reviewed and are negative  Physical Exam:  /72 (BP Location: Left arm, Patient Position: Sitting, Cuff Size: Standard)   Pulse 60   Physical Exam   Constitutional: He is oriented to person, place, and time  He appears well-developed and well-nourished  HENT:   Head: Normocephalic and atraumatic  Eyes: Pupils are equal, round, and reactive to light  Conjunctivae are normal    Neck: Normal range of motion  Neck supple  Cardiovascular: Normal rate and normal heart sounds  Pulmonary/Chest: Effort normal and breath sounds normal    Neurological: He is alert and oriented to person, place, and time  Skin: Skin is warm and dry  Psychiatric: He has a normal mood and affect  Vitals reviewed  Discussion/Summary:I will continue the patient's present medical regimen  The patient appears well compensated  I have asked the patient to call if there is a problem in the interim otherwise I will see the patient in one years time  Patient is due for an echocardiogram prior to the next visit to assess LV wall thickness and systolic function

## 2019-08-27 ENCOUNTER — OFFICE VISIT (OUTPATIENT)
Dept: CARDIOLOGY CLINIC | Facility: CLINIC | Age: 79
End: 2019-08-27
Payer: MEDICARE

## 2019-08-27 VITALS — HEART RATE: 60 BPM | DIASTOLIC BLOOD PRESSURE: 72 MMHG | SYSTOLIC BLOOD PRESSURE: 150 MMHG

## 2019-08-27 DIAGNOSIS — I10 ESSENTIAL (PRIMARY) HYPERTENSION: Primary | ICD-10-CM

## 2019-08-27 DIAGNOSIS — I25.10 CORONARY ARTERIOSCLEROSIS: ICD-10-CM

## 2019-08-27 DIAGNOSIS — E78.00 PURE HYPERCHOLESTEROLEMIA: ICD-10-CM

## 2019-08-27 PROCEDURE — 93000 ELECTROCARDIOGRAM COMPLETE: CPT | Performed by: INTERNAL MEDICINE

## 2019-08-27 PROCEDURE — 99214 OFFICE O/P EST MOD 30 MIN: CPT | Performed by: INTERNAL MEDICINE

## 2020-07-14 ENCOUNTER — HOSPITAL ENCOUNTER (OUTPATIENT)
Dept: NON INVASIVE DIAGNOSTICS | Age: 80
Discharge: HOME | End: 2020-07-14
Payer: MEDICARE

## 2020-07-14 DIAGNOSIS — I25.10 CORONARY ARTERIOSCLEROSIS: ICD-10-CM

## 2020-07-14 DIAGNOSIS — I10 ESSENTIAL (PRIMARY) HYPERTENSION: ICD-10-CM

## 2020-07-14 DIAGNOSIS — E78.00 PURE HYPERCHOLESTEROLEMIA: ICD-10-CM

## 2020-07-14 PROCEDURE — 93306 TTE W/DOPPLER COMPLETE: CPT

## 2020-07-14 PROCEDURE — 93306 TTE W/DOPPLER COMPLETE: CPT | Performed by: INTERNAL MEDICINE

## 2021-01-23 DIAGNOSIS — Z23 ENCOUNTER FOR IMMUNIZATION: ICD-10-CM

## 2021-12-09 ENCOUNTER — OFFICE VISIT (OUTPATIENT)
Dept: GASTROENTEROLOGY | Facility: CLINIC | Age: 81
End: 2021-12-09
Payer: MEDICARE

## 2021-12-09 ENCOUNTER — TELEPHONE (OUTPATIENT)
Dept: GASTROENTEROLOGY | Facility: CLINIC | Age: 81
End: 2021-12-09

## 2021-12-09 VITALS
HEIGHT: 72 IN | DIASTOLIC BLOOD PRESSURE: 80 MMHG | SYSTOLIC BLOOD PRESSURE: 138 MMHG | WEIGHT: 240.4 LBS | HEART RATE: 76 BPM | BODY MASS INDEX: 32.56 KG/M2

## 2021-12-09 DIAGNOSIS — Z98.890 HISTORY OF COLONOSCOPY: ICD-10-CM

## 2021-12-09 DIAGNOSIS — R19.7 DIARRHEA, UNSPECIFIED TYPE: Primary | ICD-10-CM

## 2021-12-09 PROCEDURE — 99203 OFFICE O/P NEW LOW 30 MIN: CPT | Performed by: NURSE PRACTITIONER

## 2021-12-09 RX ORDER — CHOLESTYRAMINE 4 G/9G
1 POWDER, FOR SUSPENSION ORAL
Qty: 30 PACKET | Refills: 2 | Status: SHIPPED | OUTPATIENT
Start: 2021-12-09

## 2021-12-13 RX ORDER — UBIDECARENONE 50 MG
CAPSULE ORAL
COMMUNITY

## 2023-06-14 ENCOUNTER — OFFICE VISIT (OUTPATIENT)
Dept: WOUND CARE | Facility: HOSPITAL | Age: 83
End: 2023-06-14
Payer: COMMERCIAL

## 2023-06-14 VITALS
HEIGHT: 72 IN | WEIGHT: 240 LBS | BODY MASS INDEX: 32.51 KG/M2 | RESPIRATION RATE: 20 BRPM | TEMPERATURE: 97.5 F | HEART RATE: 98 BPM | SYSTOLIC BLOOD PRESSURE: 148 MMHG | DIASTOLIC BLOOD PRESSURE: 90 MMHG

## 2023-06-14 DIAGNOSIS — I87.2 CHRONIC VENOUS STASIS DERMATITIS OF RIGHT LOWER EXTREMITY: ICD-10-CM

## 2023-06-14 DIAGNOSIS — S91.001A OPEN WOUND OF RIGHT ANKLE, INITIAL ENCOUNTER: Primary | ICD-10-CM

## 2023-06-14 DIAGNOSIS — I87.391 CHRONIC VENOUS HYPERTENSION (IDIOPATHIC) WITH OTHER COMPLICATIONS OF RIGHT LOWER EXTREMITY: ICD-10-CM

## 2023-06-14 PROCEDURE — 99213 OFFICE O/P EST LOW 20 MIN: CPT | Performed by: PODIATRIST

## 2023-06-14 RX ORDER — BETAMETHASONE DIPROPIONATE 0.5 MG/G
CREAM TOPICAL DAILY
Qty: 30 G | Refills: 0 | Status: SHIPPED | OUTPATIENT
Start: 2023-06-14

## 2023-06-14 NOTE — PROGRESS NOTES
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  -     Ambulatory Referral to Podiatry; Future  -     betamethasone dipropionate (DIPROSONE) 0 05 % cream; Apply topically daily    3  Chronic venous hypertension (idiopathic) with other complications of right lower extremity  -     VAS lower limb arterial duplex, complete bilateral; Future; Expected date: 06/14/2023  -     VAS reflux lower limb venous duplex study with reflux assessment, complete bilateral; Future; Expected date: 06/14/2023  -     Ambulatory Referral to Podiatry; Future        Diagnosis ICD-10-CM Associated Orders   1  Open wound of right ankle, initial encounter  S91 001A Wound cleansing and dressings      2  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  I87 2 Ambulatory Referral to Podiatry     betamethasone dipropionate (DIPROSONE) 0 05 % cream      3  Chronic venous hypertension (idiopathic) with other complications of right lower extremity  I87 391 VAS lower limb arterial duplex, complete bilateral     VAS reflux lower limb venous duplex study with reflux assessment, complete bilateral     Ambulatory Referral to Podiatry           Assessment & Plan:  1  Chronic venous dermatitis to right lower extremity complicated by chronic venous insufficiency and noncompressible arterial blood vessels  No debridement was performed, today we applied betamethasone cream to affected areas, this was also prescribed for patient, he will do this daily, cover with dry dressing and Tubigrip or home compression stockings during all waking hours    I have deferred use of Unna boot as his blood vessels were noncompressible  2   I ordered lower extremity arterial and lower extremity venous Dopplers as this is a chronic problem  3   Patient with elongated toenails, he is at risk for nail care, he cannot cut his own toenails, he was referred to podiatry  4   Today I reviewed etiology of venous stasis, venous stasis dermatitis, noncompressible blood vessels, testing and treatment options  Patient  was educated on frequent elevation, low-sodium diet, proper protein intake  5   Patient understands and agrees with the plan and will follow-up in 2 weeks  Subjective:   Patient presents today for care of right lower extremity weeping, he states he has had a history of open wounds in the past there, he wears 15 to 20 mmHg knee-high compression stockings which are relatively new  He states all of his stockings are in good condition and he rotates them out when they start to stretch  He has been treated with management in the past with Unna boot  He does not complain of any nausea, vomiting, fever, chills        The following portions of the patient's history were reviewed and updated as appropriate:   Patient Active Problem List   Diagnosis   • Severe sepsis ([facility] [address] )   • H/O asbestosis   • ASCVD (arteriosclerotic cardiovascular disease)   • Essential hypertension   • Other hyperlipidemia   • COPD (chronic obstructive pulmonary disease) ([facility] [address] )   • Hyponatremia   • LAKSHMI (acute kidney injury) ([facility] [address] )     Past Medical History:   Diagnosis Date   • Arthritis    • Cardiac disease     heart attck 10 years ago   • COPD (chronic obstructive pulmonary disease) (HCC)      Past Surgical History:   Procedure Laterality Date   • CARDIAC SURGERY      stent placement   • EYE SURGERY      bilateral cataract surgery   • VASCULAR SURGERY      legs     Social History     Socioeconomic History   • Marital status: Single     Spouse name: Not on file   • Number of children: Not on file   • Years of education: Not on file   • Highest education level: Not on file   Occupational History   • Not on file   Tobacco Use   • Smoking status: Former   • Smokeless tobacco: Never   Vaping Use   • Vaping Use: Never used   Substance and Sexual Activity   • Alcohol use: Yes     Comment: occasional   • Drug use: No   • Sexual activity: Not on file   Other Topics Concern   • Not on file   Social History Narrative   • Not on file     Social Determinants of Health     Financial Resource Strain: Not on file   Food Insecurity: Not on file   Transportation Needs: Not on file   Physical Activity: Not on file   Stress: Not on file   Social Connections: Not on file   Intimate Partner Violence: Not on file   Housing Stability: Not on file        Current Outpatient Medications:   •  ANORO ELLIPTA 62 5-25 MCG/INH inhaler, INHALE 1 PUFF BY MOUTH ONCE A DAY - RINSE MOUTH AFTER USE, Disp: , Rfl: 0  •  betamethasone dipropionate (DIPROSONE) 0 05 % cream, Apply topically daily, Disp: 30 g, Rfl: 0  •  ezetimibe (ZETIA) 10 mg tablet, Take 10 mg by mouth 3 (three) times a week , Disp: , Rfl:   •  metoprolol tartrate (LOPRESSOR) 50 mg tablet, Take 50 mg by mouth daily  , Disp: , Rfl:   •  aspirin 325 mg tablet, Take 325 mg by mouth daily  (Patient not taking: Reported on 6/14/2023), Disp: , Rfl:   •  cholestyramine (QUESTRAN) 4 g packet, Take 1 packet (4 g total) by mouth daily at bedtime (Patient not taking: Reported on 6/14/2023), Disp: 30 packet, Rfl: 2  •  Coenzyme Q10 (SM CoQ-10) 50 MG CAPS, Take by mouth (Patient not taking: Reported on 6/14/2023), Disp: , Rfl:   •  Fish Oil-Krill Oil (KRILL OIL PLUS) CAPS, Take by mouth (Patient not taking: Reported on 12/9/2021), Disp: , Rfl:   Family History   Problem Relation Age of Onset   • Colon cancer Neg Hx    • Colon polyps Neg Hx        Review of Systems   Constitutional: Negative for chills and fever  HENT: Negative for ear pain and sore throat      Eyes: Negative for pain and visual disturbance  Respiratory: Negative for cough and shortness of breath  Cardiovascular: Negative for chest pain and palpitations  Gastrointestinal: Negative for abdominal pain and vomiting  Genitourinary: Negative for dysuria and hematuria  Musculoskeletal: Positive for gait problem  Negative for arthralgias and back pain  Skin: Positive for color change, rash and wound  Neurological: Positive for numbness  Negative for seizures and syncope  Psychiatric/Behavioral: Negative  All other systems reviewed and are negative  Objective:  /90   Pulse 98   Temp 97 5 °F (36 4 °C)   Resp 20   Ht 6' (1 829 m)   Wt 109 kg (240 lb)   BMI 32 55 kg/m²     Physical Exam  Constitutional:       Appearance: Normal appearance  He is obese  HENT:      Head: Normocephalic and atraumatic  Right Ear: External ear normal       Left Ear: External ear normal       Nose: Nose normal       Mouth/Throat:      Mouth: Mucous membranes are moist       Pharynx: Oropharynx is clear  Eyes:      Conjunctiva/sclera: Conjunctivae normal    Cardiovascular:      Pulses:           Dorsalis pedis pulses are detected w/ Doppler on the right side and detected w/ Doppler on the left side  Posterior tibial pulses are detected w/ Doppler on the right side and detected w/ Doppler on the left side  Pulmonary:      Effort: Pulmonary effort is normal    Musculoskeletal:      Cervical back: Normal range of motion  Right lower le+ Pitting Edema present  Left lower le+ Pitting Edema present  Skin:     General: Skin is warm and dry  Capillary Refill: Capillary refill takes less than 2 seconds  Comments: See detailed wound assessment   Neurological:      General: No focal deficit present  Mental Status: He is alert and oriented to person, place, and time  Mental status is at baseline  Sensory: Sensory deficit present        Coordination: Coordination abnormal       Gait: Gait abnormal       Deep Tendon Reflexes: Reflexes abnormal    Psychiatric:         Mood and Affect: Mood normal          Behavior: Behavior normal          Thought Content: Thought content normal          Judgment: Judgment normal              Wound 06/14/23 Venous Ulcer Pretibial Distal;Right (Active)   Wound Image   06/14/23 1325   Wound Description Epithelialization;Pink;Edema 06/14/23 1325   Niurka-wound Assessment Erythema 06/14/23 1325   Wound Length (cm) 6 cm 06/14/23 1325   Wound Width (cm) 9 cm 06/14/23 1325   Wound Depth (cm) 0 1 cm 06/14/23 1325   Wound Surface Area (cm^2) 54 cm^2 06/14/23 1325   Wound Volume (cm^3) 5 4 cm^3 06/14/23 1325   Calculated Wound Volume (cm^3) 5 4 cm^3 06/14/23 1325   Drainage Amount Moderate 06/14/23 1325   Drainage Description Serous 06/14/23 1325   Non-staged Wound Description Full thickness 06/14/23 1325   Dressing Status Intact 06/14/23 1325                 No results found  Wound Instructions:  Orders Placed This Encounter   Procedures   • Wound cleansing and dressings     Right lower leg wound  Wash your hands with soap and water  Remove old dressing, discard into plastic bag and place in trash  Cleanse the wound with soap and water prior to applying a clean dressing  Do not use tissue or cotton balls  Do not scrub the wound  Pat dry using gauze    Shower yes   Apply prescribed medication    Secure with gauze and [place] and tape  Change dressing daily  Wear compression stocking daily  Done today     Standing Status:   Future     Standing Expiration Date:   6/14/2024   • Ambulatory Referral to Podiatry     Standing Status:   Future     Standing Expiration Date:   6/14/2024     Referral Priority:   Routine     Referral Type:   Consult - AMB     Referral Reason:   Specialty Services Required     Referred to Provider:   Marshall Mckeon DPM     Requested Specialty:   Podiatry     Number of Visits Requested:   1     Expiration Date:   6/14/2024         Marshall Mckeon DPM,TONYA, "FACFAS    Portions of the record may have been created with voice recognition software  Occasional wrong word or \"sound a like\" substitutions may have occurred due to the inherent limitations of voice recognition software  Read the chart carefully and recognize, using context, where substitutions have occurred      "

## 2023-06-14 NOTE — PATIENT INSTRUCTIONS
Orders Placed This Encounter   Procedures    Wound cleansing and dressings     Right lower leg wound  Wash your hands with soap and water  Remove old dressing, discard into plastic bag and place in trash  Cleanse the wound with soap and water prior to applying a clean dressing  Do not use tissue or cotton balls  Do not scrub the wound  Pat dry using gauze    Shower yes   Apply prescribed medication    Secure with gauze and [place] and tape  Change dressing daily  Wear compression stocking daily  Done today     Standing Status:   Future     Standing Expiration Date:   6/14/2024

## 2023-06-29 ENCOUNTER — OFFICE VISIT (OUTPATIENT)
Dept: WOUND CARE | Facility: HOSPITAL | Age: 83
End: 2023-06-29
Payer: COMMERCIAL

## 2023-06-29 VITALS
TEMPERATURE: 97.2 F | DIASTOLIC BLOOD PRESSURE: 80 MMHG | HEART RATE: 80 BPM | SYSTOLIC BLOOD PRESSURE: 160 MMHG | RESPIRATION RATE: 16 BRPM

## 2023-06-29 DIAGNOSIS — S91.001A OPEN WOUND OF RIGHT ANKLE, INITIAL ENCOUNTER: Primary | ICD-10-CM

## 2023-06-29 DIAGNOSIS — I87.391 CHRONIC VENOUS HYPERTENSION (IDIOPATHIC) WITH OTHER COMPLICATIONS OF RIGHT LOWER EXTREMITY: ICD-10-CM

## 2023-06-29 DIAGNOSIS — I87.2 CHRONIC VENOUS STASIS DERMATITIS OF RIGHT LOWER EXTREMITY: ICD-10-CM

## 2023-06-29 PROCEDURE — 99212 OFFICE O/P EST SF 10 MIN: CPT | Performed by: NURSE PRACTITIONER

## 2023-06-29 NOTE — PROGRESS NOTES
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    Chronic venous hypertension (idiopathic) with other complications of right lower extremity          Plan:  1  F/U visit  Wound epithelialized  Counseled patient to discontinue betamethasone cream and to apply lotion to right lower leg daily  Also counseled patient to resume wearing his compression stocking on his right lower leg  Patient verbalized agreement and understanding with plan of care  He is discharged in the wound center today  He may follow-up as needed     Wound 06/14/23 Venous Ulcer Pretibial Distal;Right (Active)   Wound Image Images linked 06/29/23 1100   Wound Description Epithelialization 06/29/23 1100   Niurka-wound Assessment Intact 06/29/23 1100   Wound Length (cm) 0 cm 06/29/23 1100   Wound Width (cm) 0 cm 06/29/23 1100   Wound Depth (cm) 0 cm 06/29/23 1100   Wound Surface Area (cm^2) 0 cm^2 06/29/23 1100   Wound Volume (cm^3) 0 cm^3 06/29/23 1100   Calculated Wound Volume (cm^3) 0 cm^3 06/29/23 1100   Change in Wound Size % 100 06/29/23 1100   Drainage Amount None 06/29/23 1100   Non-staged Wound Description Not applicable 78/73/27 0874   Dressing Status Intact 06/29/23 1100       Wound 06/14/23 Venous Ulcer Pretibial Distal;Right (Active)   Date First Assessed/Time First Assessed: 06/14/23 1324   Present on Original Admission: Yes  Primary Wound Type: Venous Ulcer  Location: Pretibial  Wound Location Orientation: Distal;Right       Subjective:        F/u visit for venous ulcer of right lower leg    No new complaints  He denies any pain, fevers, or chills  The following portions of the patient's history were reviewed and updated as appropriate:   He  has a past medical history of Arthritis, Cardiac disease, and COPD (chronic obstructive pulmonary disease) ([facility] )  He   Patient Active Problem List    Diagnosis Date Noted   • Hyponatremia 08/28/2018   • LAKSHMI (acute kidney injury) ([facility] ) 08/28/2018   • Severe sepsis ([place] [address] ) 08/27/2018   • H/O asbestosis 08/27/2018   • ASCVD (arteriosclerotic cardiovascular disease) 08/27/2018   • Essential hypertension 08/27/2018   • Other hyperlipidemia 08/27/2018   • COPD (chronic obstructive pulmonary disease) ([place] [address] ) 08/27/2018     He  has a past surgical history that includes Cardiac surgery; Vascular surgery; and Eye surgery  His family history is not on file  He  reports that he has quit smoking  He has never used smokeless tobacco  He reports current alcohol use  He reports that he does not use drugs  Current Outpatient Medications   Medication Sig Dispense Refill   • ANORO ELLIPTA 62 5-25 MCG/INH inhaler INHALE 1 PUFF BY MOUTH ONCE A DAY - RINSE MOUTH AFTER USE  0   • aspirin 325 mg tablet Take 325 mg by mouth daily  (Patient not taking: Reported on 6/14/2023)     • betamethasone dipropionate (DIPROSONE) 0 05 % cream Apply topically daily 30 g 0   • cholestyramine (QUESTRAN) 4 g packet Take 1 packet (4 g total) by mouth daily at bedtime (Patient not taking: Reported on 6/14/2023) 30 packet 2   • Coenzyme Q10 (SM CoQ-10) 50 MG CAPS Take by mouth (Patient not taking: Reported on 6/14/2023)     • ezetimibe (ZETIA) 10 mg tablet Take 10 mg by mouth 3 (three) times a week  • Fish Oil-Krill Oil (KRILL OIL PLUS) CAPS Take by mouth (Patient not taking: Reported on 12/9/2021)     • metoprolol tartrate (LOPRESSOR) 50 mg tablet Take 50 mg by mouth daily         No current facility-administered medications for this visit  He is allergic to niacin       Review of Systems Constitutional: Negative  HENT: Negative for ear pain and hearing loss  Eyes: Negative for pain  Respiratory: Negative for chest tightness and shortness of breath  Cardiovascular: Positive for leg swelling  Negative for chest pain and palpitations  Gastrointestinal: Negative for diarrhea, nausea and vomiting  Genitourinary: Negative for dysuria  Musculoskeletal: Negative for gait problem  Skin: Positive for wound  Neurological: Negative for tremors and weakness  Psychiatric/Behavioral: Negative for behavioral problems, confusion and suicidal ideas  Objective:       Wound 06/14/23 Venous Ulcer Pretibial Distal;Right (Active)   Wound Image Images linked 06/29/23 1100   Wound Description Epithelialization 06/29/23 1100   Niurka-wound Assessment Intact 06/29/23 1100   Wound Length (cm) 0 cm 06/29/23 1100   Wound Width (cm) 0 cm 06/29/23 1100   Wound Depth (cm) 0 cm 06/29/23 1100   Wound Surface Area (cm^2) 0 cm^2 06/29/23 1100   Wound Volume (cm^3) 0 cm^3 06/29/23 1100   Calculated Wound Volume (cm^3) 0 cm^3 06/29/23 1100   Change in Wound Size % 100 06/29/23 1100   Drainage Amount None 06/29/23 1100   Non-staged Wound Description Not applicable 60/38/10 0636   Dressing Status Intact 06/29/23 1100       /80   Pulse 80   Temp (!) 97 2 °F (36 2 °C)   Resp 16     Physical Exam  Vitals and nursing note reviewed  Constitutional:       General: He is not in acute distress  Appearance: Normal appearance  He is obese  HENT:      Head: Normocephalic and atraumatic  Eyes:      General:         Right eye: No discharge  Left eye: No discharge  Pulmonary:      Effort: Pulmonary effort is normal  No respiratory distress  Musculoskeletal:         General: Normal range of motion  Cervical back: Normal range of motion and neck supple  No rigidity  Right lower leg: No edema  Left lower leg: No edema  Skin:     General: Skin is warm and dry        Findings: No erythema or wound  Neurological:      General: No focal deficit present  Mental Status: He is alert and oriented to person, place, and time  Mental status is at baseline  Psychiatric:         Mood and Affect: Mood normal          Behavior: Behavior normal          Thought Content: Thought content normal          Judgment: Judgment normal                     Wound Instructions:  Orders Placed This Encounter   Procedures   • Wound cleansing and dressings     Right lower leg is healed    Moisturize daily, wear compression stockings daily, prescription cream is needed if irritation returns  Done today     Standing Status:   Future     Standing Expiration Date:   6/29/2024        Diagnosis ICD-10-CM Associated Orders   1  Open wound of right ankle, initial encounter  S91 001A Wound cleansing and dressings      2  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

## 2023-06-29 NOTE — PATIENT INSTRUCTIONS
Orders Placed This Encounter   Procedures    Wound cleansing and dressings     Right lower leg is healed    Moisturize daily, wear compression stockings daily, prescription cream is needed if irritation returns  Done today     Standing Status:   Future     Standing Expiration Date:   6/29/2024

## 2023-07-05 ENCOUNTER — HOSPITAL ENCOUNTER (OUTPATIENT)
Dept: NON INVASIVE DIAGNOSTICS | Facility: HOSPITAL | Age: 83
Discharge: HOME | End: 2023-07-05
Attending: PODIATRIST
Payer: COMMERCIAL

## 2023-07-05 DIAGNOSIS — I87.391 CHRONIC VENOUS HYPERTENSION (IDIOPATHIC) WITH OTHER COMPLICATIONS OF RIGHT LOWER EXTREMITY: ICD-10-CM

## 2023-07-05 PROCEDURE — 93922 UPR/L XTREMITY ART 2 LEVELS: CPT | Performed by: SURGERY

## 2023-07-05 PROCEDURE — 93925 LOWER EXTREMITY STUDY: CPT

## 2023-07-05 PROCEDURE — 93923 UPR/LXTR ART STDY 3+ LVLS: CPT

## 2023-07-05 PROCEDURE — 93925 LOWER EXTREMITY STUDY: CPT | Performed by: SURGERY

## 2023-07-06 ENCOUNTER — OFFICE VISIT (OUTPATIENT)
Dept: PODIATRY | Facility: CLINIC | Age: 83
End: 2023-07-06
Payer: MEDICARE

## 2023-07-06 ENCOUNTER — HOSPITAL ENCOUNTER (OUTPATIENT)
Dept: NON INVASIVE DIAGNOSTICS | Facility: HOSPITAL | Age: 83
Discharge: HOME | End: 2023-07-06
Attending: PODIATRIST
Payer: COMMERCIAL

## 2023-07-06 VITALS
HEART RATE: 67 BPM | WEIGHT: 245 LBS | HEIGHT: 72 IN | BODY MASS INDEX: 33.18 KG/M2 | DIASTOLIC BLOOD PRESSURE: 90 MMHG | SYSTOLIC BLOOD PRESSURE: 151 MMHG

## 2023-07-06 DIAGNOSIS — I87.2 CHRONIC VENOUS STASIS DERMATITIS OF RIGHT LOWER EXTREMITY: ICD-10-CM

## 2023-07-06 DIAGNOSIS — I87.391 CHRONIC VENOUS HYPERTENSION (IDIOPATHIC) WITH OTHER COMPLICATIONS OF RIGHT LOWER EXTREMITY: ICD-10-CM

## 2023-07-06 DIAGNOSIS — R20.8 LOSS OF PROTECTIVE SENSATION OF SKIN OF FOOT: ICD-10-CM

## 2023-07-06 DIAGNOSIS — B35.1 ONYCHOMYCOSIS: Primary | ICD-10-CM

## 2023-07-06 PROCEDURE — 11721 DEBRIDE NAIL 6 OR MORE: CPT | Performed by: PODIATRIST

## 2023-07-06 PROCEDURE — 99213 OFFICE O/P EST LOW 20 MIN: CPT | Performed by: PODIATRIST

## 2023-07-06 PROCEDURE — 93970 EXTREMITY STUDY: CPT

## 2023-07-06 PROCEDURE — 93970 EXTREMITY STUDY: CPT | Performed by: SURGERY

## 2023-07-06 RX ORDER — IPRATROPIUM BROMIDE AND ALBUTEROL SULFATE 2.5; .5 MG/3ML; MG/3ML
SOLUTION RESPIRATORY (INHALATION)
COMMUNITY
Start: 2023-07-05

## 2023-07-06 RX ORDER — FLUTICASONE FUROATE, UMECLIDINIUM BROMIDE AND VILANTEROL TRIFENATATE 100; 62.5; 25 UG/1; UG/1; UG/1
1 POWDER RESPIRATORY (INHALATION) DAILY
COMMUNITY
Start: 2023-06-26

## 2023-07-06 NOTE — PROGRESS NOTES
Patient ID: Tracy Irby is a 80 y.o. male Date of Birth 1940       No chief complaint on file. Diagnosis:  1. Onychomycosis    2. 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 Chronic venous hypertension (idiopathic) with other complications of right lower extremity  -     Ambulatory Referral to Podiatry    4. Loss of protective sensation of skin of foot      1. Examination with socks and shoes removed bilaterally. 2.  We discussed chronic venous hypertension, I reviewed patient's lower extremity arterial Doppler results from yesterday showing no arterial occlusive disease, pressures are all above healing, he is scheduled for venous Dopplers today at 5 PM, he is advised to keep that appointment. 3.  Patient to continue use of 20 to 30 mmHg close toed knee-high compression stockings during all waking hours, he is advised to replace them annually. 4.  Today we discussed at risk foot care, patient has loss of protective sensation, toenails were debrided x10 without incident. 5.  Patient is advised to check his feet daily, no barefoot, wash and moisturize feet. If he notices anything abnormal he is to call us immediately. 6.  Patient understands and agrees with the plan and will follow-up in 4 months. Subjective:   Patient presents today for care of very thick painful toenails which he cannot cut, he states he does not feel his fingers and his toes and he is afraid he is going to cut himself. Patient recently seen at [address] [place] [address] wound management center, he states after he applied the steroid cream I prescribed the wound closed up right away. He had arterial Dopplers yesterday and has venous Doppler scheduled for today. He continues to wear his compression stockings.         The following portions of the patient's history were reviewed and updated as appropriate: allergies, current medications, past family history, past medical history, past social history, past surgical history and problem list.        Objective:  /90 (BP Location: Right arm, Patient Position: Sitting, Cuff Size: Adult)   Pulse 67   Ht 6' (1.829 m) Comment: verbal  Wt 111 kg (245 lb)   BMI 33.23 kg/m²     Review of Systems   Constitutional: Negative for chills and fever. HENT: Negative for ear pain and sore throat. Eyes: Negative for pain and visual disturbance. Respiratory: Negative for cough and shortness of breath. Cardiovascular: Negative for chest pain and palpitations. Gastrointestinal: Negative for abdominal pain and vomiting. Genitourinary: Negative for dysuria and hematuria. Musculoskeletal: Negative for arthralgias and back pain. Skin: Negative for color change and rash. Long painful toenails   Neurological: Negative for seizures and syncope. All other systems reviewed and are negative. Physical Exam  Constitutional:       Appearance: Normal appearance. He is normal weight. HENT:      Head: Normocephalic and atraumatic. Right Ear: External ear normal.      Left Ear: External ear normal.      Nose: Nose normal.      Mouth/Throat:      Mouth: Mucous membranes are moist.      Pharynx: Oropharynx is clear. Eyes:      Conjunctiva/sclera: Conjunctivae normal.   Cardiovascular:      Pulses: Normal pulses. Dorsalis pedis pulses are 2+ on the right side and 2+ on the left side. Posterior tibial pulses are 2+ on the right side and 2+ on the left side. Pulmonary:      Effort: Pulmonary effort is normal.   Musculoskeletal:      Cervical back: Normal range of motion. Right lower le+ Pitting Edema present. Left lower le+ Pitting Edema present. Feet:      Right foot:      Protective Sensation: 10 sites tested. 0 sites sensed. Skin integrity: Skin integrity normal.      Toenail Condition: Right toenails are abnormally thick and long. Fungal disease present.      Left foot: Protective Sensation: 10 sites tested. 0 sites sensed. Skin integrity: Skin integrity normal.      Toenail Condition: Left toenails are abnormally thick and long. Fungal disease present. 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 Neurological:      Mental Status: He is alert. Mental status is at baseline. Psychiatric:         Mood and Affect: Mood normal.         Behavior: Behavior normal.              VAS lower limb arterial duplex, complete bilateral    Result Date: 7/5/2023  Narrative:  [facility] REPORT [facility]INICAL: Indications: Patient presents with wound on right distal calf, now healed, and diminished pedal pulses on physical examination. Patient denies pain in his legs after walking any distance. Operative History: Cardiac stent Pacemaker Risk Factors The patient has history of HTN, Hyperlipidemia and previous smoking (quit >10yrs ago). Clinical Right Pressure:  151/ mm Hg, Left Pressure:  157/ mm Hg. FINDINGS:  Right                  Impression  Waveform   PSV (cm/s)  AP (cm)  Post. Tibial                       Triphasic                       Ant. Tibial                        Triphasic                       Common Femoral Artery                                 92           Prox Profunda                                         72           Prox SFA               Ectatic                        80      1.1  Mid SFA                Ectatic                        88      0.8  Dist SFA                                             116           Proximal Pop           Ectatic                        75      1.0  Distal Pop                                            56           Tibioperoneal                                         49           Dist Post Tibial                                     122           Dist. Ant.  Tibial 108           Dist Peroneal                                         70            Left                   Impression  Waveform   PSV (cm/s)  AP (cm)  AP (cm)  Post. Tibial                       Triphasic                                Ant. Tibial                        Triphasic                                Common Femoral Artery                                117               1.2  Prox Profunda                                         59                    Prox SFA               Ectatic                        93               1.0  Mid SFA                Ectatic                        88      8.3      0.8  Dist SFA                                              64                    Proximal Pop           Ectatic                        95               1.0  Distal Pop                                            69                    Tibioperoneal                                         67                    Dist Post Tibial                                      72                    Dist. Ant. Tibial                                     86                    Dist Peroneal                                         59                       CONCLUSION:  Impression: RIGHT LOWER LIMB: No evidence of significant lower extremity arterial occlusive disease. Diffusely ectatic throughout the femoro-popliteal segments. Ankle/Brachial index:  0.94 which is in the normal category. PVR/ PPG tracings are normal. Metatarsal pressure of  192 mm Hg Great toe pressure of  116 mm Hg, within the healing range  LEFT LOWER LIMB: No evidence of significant lower extremity arterial occlusive disease. Diffusely ectatic throughout the femoro-popliteal segments. Ankle/Brachial index:  1.19 which is in the normal category. PVR/ PPG tracings are normal. Metatarsal pressure of  >250 mm Hg Great toe pressure of  104 mm Hg, within the healing range  There is no previous study for comparison.   SIGNATURE: Electronically Signed by: Marguerite Hodges Vasyl Jason MD on 2023-07-05 09:50:11 PM              John Mohr DPM, TONYA, FACFAS    Portions of the record may have been created with voice recognition software. Occasional wrong word or "sound a like" substitutions may have occurred due to the inherent limitations of voice recognition software. Read the chart carefully and recognize, using context, where substitutions have occurred.

## 2023-11-08 ENCOUNTER — APPOINTMENT (EMERGENCY)
Dept: CT IMAGING | Facility: HOSPITAL | Age: 83
DRG: 189 | End: 2023-11-08
Payer: COMMERCIAL

## 2023-11-08 ENCOUNTER — APPOINTMENT (EMERGENCY)
Dept: RADIOLOGY | Facility: HOSPITAL | Age: 83
DRG: 189 | End: 2023-11-08
Payer: COMMERCIAL

## 2023-11-08 ENCOUNTER — APPOINTMENT (INPATIENT)
Dept: CT IMAGING | Facility: HOSPITAL | Age: 83
DRG: 189 | End: 2023-11-08
Payer: COMMERCIAL

## 2023-11-08 ENCOUNTER — APPOINTMENT (INPATIENT)
Dept: RADIOLOGY | Facility: HOSPITAL | Age: 83
DRG: 189 | End: 2023-11-08
Payer: COMMERCIAL

## 2023-11-08 ENCOUNTER — HOSPITAL ENCOUNTER (INPATIENT)
Facility: HOSPITAL | Age: 83
LOS: 3 days | Discharge: HOME | DRG: 189 | End: 2023-11-11
Attending: EMERGENCY MEDICINE | Admitting: INTERNAL MEDICINE
Payer: COMMERCIAL

## 2023-11-08 DIAGNOSIS — U07.1 COVID-19: Primary | ICD-10-CM

## 2023-11-08 DIAGNOSIS — J44.1 COPD EXACERBATION (HCC): ICD-10-CM

## 2023-11-08 DIAGNOSIS — R79.89 ELEVATED TROPONIN: ICD-10-CM

## 2023-11-08 DIAGNOSIS — J96.01 ACUTE RESPIRATORY FAILURE WITH HYPOXIA (HCC): ICD-10-CM

## 2023-11-08 PROBLEM — Z86.718 HISTORY OF DVT (DEEP VEIN THROMBOSIS): Status: ACTIVE | Noted: 2023-11-08

## 2023-11-08 PROBLEM — R06.02 SOB (SHORTNESS OF BREATH): Status: ACTIVE | Noted: 2023-11-08

## 2023-11-08 PROBLEM — R65.10 SIRS (SYSTEMIC INFLAMMATORY RESPONSE SYNDROME) (HCC): Status: ACTIVE | Noted: 2023-11-08

## 2023-11-08 LAB
2HR DELTA HS TROPONIN: 2856 NG/L
ALBUMIN SERPL BCP-MCNC: 4 G/DL (ref 3.5–5)
ALP SERPL-CCNC: 83 U/L (ref 34–104)
ALT SERPL W P-5'-P-CCNC: 21 U/L (ref 7–52)
ANION GAP SERPL CALCULATED.3IONS-SCNC: 8 MMOL/L
APTT PPP: 29 SECONDS (ref 23–37)
AST SERPL W P-5'-P-CCNC: 19 U/L (ref 13–39)
BACTERIA UR QL AUTO: ABNORMAL /HPF
BASOPHILS # BLD AUTO: 0.06 THOUSANDS/ÂΜL (ref 0–0.1)
BASOPHILS NFR BLD AUTO: 1 % (ref 0–1)
BILIRUB SERPL-MCNC: 0.62 MG/DL (ref 0.2–1)
BILIRUB UR QL STRIP: NEGATIVE
BNP SERPL-MCNC: 162 PG/ML (ref 0–100)
BUN SERPL-MCNC: 14 MG/DL (ref 5–25)
CALCIUM SERPL-MCNC: 9.1 MG/DL (ref 8.4–10.2)
CARDIAC TROPONIN I PNL SERPL HS: 200 NG/L
CARDIAC TROPONIN I PNL SERPL HS: 3056 NG/L
CHLORIDE SERPL-SCNC: 106 MMOL/L (ref 96–108)
CLARITY UR: CLEAR
CO2 SERPL-SCNC: 24 MMOL/L (ref 21–32)
COLOR UR: YELLOW
CREAT SERPL-MCNC: 0.96 MG/DL (ref 0.6–1.3)
CRP SERPL QL: 7.9 MG/L
D DIMER PPP FEU-MCNC: 1.31 UG/ML FEU
EOSINOPHIL # BLD AUTO: 0.46 THOUSAND/ÂΜL (ref 0–0.61)
EOSINOPHIL NFR BLD AUTO: 5 % (ref 0–6)
ERYTHROCYTE [DISTWIDTH] IN BLOOD BY AUTOMATED COUNT: 13 % (ref 11.6–15.1)
FLUAV RNA RESP QL NAA+PROBE: NEGATIVE
FLUBV RNA RESP QL NAA+PROBE: NEGATIVE
GFR SERPL CREATININE-BSD FRML MDRD: 72 ML/MIN/1.73SQ M
GLUCOSE SERPL-MCNC: 115 MG/DL (ref 65–140)
GLUCOSE UR STRIP-MCNC: NEGATIVE MG/DL
HCT VFR BLD AUTO: 42.8 % (ref 36.5–49.3)
HGB BLD-MCNC: 13.8 G/DL (ref 12–17)
HGB UR QL STRIP.AUTO: ABNORMAL
HYALINE CASTS #/AREA URNS LPF: ABNORMAL /LPF
IMM GRANULOCYTES # BLD AUTO: 0.03 THOUSAND/UL (ref 0–0.2)
IMM GRANULOCYTES NFR BLD AUTO: 0 % (ref 0–2)
INR PPP: 1.12 (ref 0.84–1.19)
KETONES UR STRIP-MCNC: NEGATIVE MG/DL
LACTATE SERPL-SCNC: 1.2 MMOL/L (ref 0.5–2)
LEUKOCYTE ESTERASE UR QL STRIP: NEGATIVE
LYMPHOCYTES # BLD AUTO: 0.79 THOUSANDS/ÂΜL (ref 0.6–4.47)
LYMPHOCYTES NFR BLD AUTO: 9 % (ref 14–44)
MAGNESIUM SERPL-MCNC: 2 MG/DL (ref 1.9–2.7)
MCH RBC QN AUTO: 29.2 PG (ref 26.8–34.3)
MCHC RBC AUTO-ENTMCNC: 32.2 G/DL (ref 31.4–37.4)
MCV RBC AUTO: 91 FL (ref 82–98)
MONOCYTES # BLD AUTO: 0.76 THOUSAND/ÂΜL (ref 0.17–1.22)
MONOCYTES NFR BLD AUTO: 8 % (ref 4–12)
NEUTROPHILS # BLD AUTO: 7.14 THOUSANDS/ÂΜL (ref 1.85–7.62)
NEUTS SEG NFR BLD AUTO: 77 % (ref 43–75)
NITRITE UR QL STRIP: NEGATIVE
NON-SQ EPI CELLS URNS QL MICRO: ABNORMAL /HPF
NRBC BLD AUTO-RTO: 0 /100 WBCS
PH UR STRIP.AUTO: 6 [PH]
PLATELET # BLD AUTO: 311 THOUSANDS/UL (ref 149–390)
PMV BLD AUTO: 8.8 FL (ref 8.9–12.7)
POTASSIUM SERPL-SCNC: 4.4 MMOL/L (ref 3.5–5.3)
PROCALCITONIN SERPL-MCNC: 0.07 NG/ML
PROT SERPL-MCNC: 7.4 G/DL (ref 6.4–8.4)
PROT UR STRIP-MCNC: ABNORMAL MG/DL
PROTHROMBIN TIME: 14.8 SECONDS (ref 11.6–14.5)
RBC # BLD AUTO: 4.72 MILLION/UL (ref 3.88–5.62)
RBC #/AREA URNS AUTO: ABNORMAL /HPF
RSV RNA RESP QL NAA+PROBE: NEGATIVE
SARS-COV-2 RNA RESP QL NAA+PROBE: POSITIVE
SODIUM SERPL-SCNC: 138 MMOL/L (ref 135–147)
SP GR UR STRIP.AUTO: 1.01 (ref 1–1.03)
UROBILINOGEN UR STRIP-ACNC: <2 MG/DL
WBC # BLD AUTO: 9.24 THOUSAND/UL (ref 4.31–10.16)
WBC #/AREA URNS AUTO: ABNORMAL /HPF

## 2023-11-08 PROCEDURE — 71045 X-RAY EXAM CHEST 1 VIEW: CPT

## 2023-11-08 PROCEDURE — 85610 PROTHROMBIN TIME: CPT | Performed by: EMERGENCY MEDICINE

## 2023-11-08 PROCEDURE — 94640 AIRWAY INHALATION TREATMENT: CPT

## 2023-11-08 PROCEDURE — 0241U HB NFCT DS VIR RESP RNA 4 TRGT: CPT | Performed by: EMERGENCY MEDICINE

## 2023-11-08 PROCEDURE — 71275 CT ANGIOGRAPHY CHEST: CPT

## 2023-11-08 PROCEDURE — 94760 N-INVAS EAR/PLS OXIMETRY 1: CPT

## 2023-11-08 PROCEDURE — 93005 ELECTROCARDIOGRAM TRACING: CPT

## 2023-11-08 PROCEDURE — 94664 DEMO&/EVAL PT USE INHALER: CPT

## 2023-11-08 PROCEDURE — G1004 CDSM NDSC: HCPCS

## 2023-11-08 PROCEDURE — 84484 ASSAY OF TROPONIN QUANT: CPT | Performed by: INTERNAL MEDICINE

## 2023-11-08 PROCEDURE — 99285 EMERGENCY DEPT VISIT HI MDM: CPT

## 2023-11-08 PROCEDURE — 86140 C-REACTIVE PROTEIN: CPT | Performed by: INTERNAL MEDICINE

## 2023-11-08 PROCEDURE — 96365 THER/PROPH/DIAG IV INF INIT: CPT

## 2023-11-08 PROCEDURE — 83880 ASSAY OF NATRIURETIC PEPTIDE: CPT | Performed by: EMERGENCY MEDICINE

## 2023-11-08 PROCEDURE — 85730 THROMBOPLASTIN TIME PARTIAL: CPT | Performed by: EMERGENCY MEDICINE

## 2023-11-08 PROCEDURE — 87040 BLOOD CULTURE FOR BACTERIA: CPT | Performed by: EMERGENCY MEDICINE

## 2023-11-08 PROCEDURE — 81001 URINALYSIS AUTO W/SCOPE: CPT | Performed by: EMERGENCY MEDICINE

## 2023-11-08 PROCEDURE — 84145 PROCALCITONIN (PCT): CPT | Performed by: EMERGENCY MEDICINE

## 2023-11-08 PROCEDURE — 99223 1ST HOSP IP/OBS HIGH 75: CPT | Performed by: INTERNAL MEDICINE

## 2023-11-08 PROCEDURE — 85379 FIBRIN DEGRADATION QUANT: CPT | Performed by: INTERNAL MEDICINE

## 2023-11-08 PROCEDURE — 83735 ASSAY OF MAGNESIUM: CPT | Performed by: EMERGENCY MEDICINE

## 2023-11-08 PROCEDURE — 85025 COMPLETE CBC W/AUTO DIFF WBC: CPT | Performed by: EMERGENCY MEDICINE

## 2023-11-08 PROCEDURE — 36415 COLL VENOUS BLD VENIPUNCTURE: CPT | Performed by: EMERGENCY MEDICINE

## 2023-11-08 PROCEDURE — 94002 VENT MGMT INPAT INIT DAY: CPT

## 2023-11-08 PROCEDURE — 80053 COMPREHEN METABOLIC PANEL: CPT | Performed by: EMERGENCY MEDICINE

## 2023-11-08 PROCEDURE — 83605 ASSAY OF LACTIC ACID: CPT | Performed by: EMERGENCY MEDICINE

## 2023-11-08 RX ORDER — AZITHROMYCIN 500 MG/1
500 TABLET, FILM COATED ORAL EVERY 24 HOURS
Status: DISCONTINUED | OUTPATIENT
Start: 2023-11-08 | End: 2023-11-08

## 2023-11-08 RX ORDER — IPRATROPIUM BROMIDE AND ALBUTEROL SULFATE .5; 3 MG/3ML; MG/3ML
1 SOLUTION RESPIRATORY (INHALATION) ONCE
Status: COMPLETED | OUTPATIENT
Start: 2023-11-08 | End: 2023-11-08

## 2023-11-08 RX ORDER — LEVALBUTEROL INHALATION SOLUTION 1.25 MG/3ML
1.25 SOLUTION RESPIRATORY (INHALATION)
Status: DISCONTINUED | OUTPATIENT
Start: 2023-11-08 | End: 2023-11-10

## 2023-11-08 RX ORDER — METHYLPREDNISOLONE SOD SUCC 125 MG
1 VIAL (EA) INJECTION ONCE
Status: COMPLETED | OUTPATIENT
Start: 2023-11-08 | End: 2023-11-08

## 2023-11-08 RX ORDER — ALBUTEROL SULFATE 90 UG/1
AEROSOL, METERED RESPIRATORY (INHALATION)
COMMUNITY

## 2023-11-08 RX ORDER — GUAIFENESIN 600 MG/1
1200 TABLET, EXTENDED RELEASE ORAL 2 TIMES DAILY
Status: DISCONTINUED | OUTPATIENT
Start: 2023-11-08 | End: 2023-11-11 | Stop reason: HOSPADM

## 2023-11-08 RX ORDER — FUROSEMIDE 10 MG/ML
20 INJECTION INTRAMUSCULAR; INTRAVENOUS ONCE
Status: COMPLETED | OUTPATIENT
Start: 2023-11-08 | End: 2023-11-08

## 2023-11-08 RX ORDER — MAGNESIUM SULFATE 1 G/100ML
1 INJECTION INTRAVENOUS ONCE
Status: COMPLETED | OUTPATIENT
Start: 2023-11-08 | End: 2023-11-08

## 2023-11-08 RX ORDER — MONTELUKAST SODIUM 10 MG/1
10 TABLET ORAL
Status: DISCONTINUED | OUTPATIENT
Start: 2023-11-08 | End: 2023-11-11 | Stop reason: HOSPADM

## 2023-11-08 RX ORDER — METOPROLOL TARTRATE 50 MG/1
50 TABLET, FILM COATED ORAL DAILY
Status: DISCONTINUED | OUTPATIENT
Start: 2023-11-08 | End: 2023-11-09

## 2023-11-08 RX ORDER — CEFTRIAXONE 1 G/50ML
1000 INJECTION, SOLUTION INTRAVENOUS EVERY 24 HOURS
Status: DISCONTINUED | OUTPATIENT
Start: 2023-11-09 | End: 2023-11-09

## 2023-11-08 RX ORDER — SODIUM CHLORIDE FOR INHALATION 0.9 %
3 VIAL, NEBULIZER (ML) INHALATION ONCE
Status: DISCONTINUED | OUTPATIENT
Start: 2023-11-08 | End: 2023-11-08

## 2023-11-08 RX ORDER — SODIUM CHLORIDE FOR INHALATION 0.9 %
12 VIAL, NEBULIZER (ML) INHALATION ONCE
Status: COMPLETED | OUTPATIENT
Start: 2023-11-08 | End: 2023-11-08

## 2023-11-08 RX ORDER — IPRATROPIUM BROMIDE AND ALBUTEROL SULFATE 2.5; .5 MG/3ML; MG/3ML
3 SOLUTION RESPIRATORY (INHALATION) 3 TIMES DAILY
Status: DISCONTINUED | OUTPATIENT
Start: 2023-11-08 | End: 2023-11-08

## 2023-11-08 RX ORDER — ACETAMINOPHEN 325 MG/1
650 TABLET ORAL EVERY 6 HOURS PRN
Status: DISCONTINUED | OUTPATIENT
Start: 2023-11-08 | End: 2023-11-11 | Stop reason: HOSPADM

## 2023-11-08 RX ORDER — METHYLPREDNISOLONE SODIUM SUCCINATE 40 MG/ML
40 INJECTION, POWDER, LYOPHILIZED, FOR SOLUTION INTRAMUSCULAR; INTRAVENOUS EVERY 8 HOURS
Status: DISCONTINUED | OUTPATIENT
Start: 2023-11-09 | End: 2023-11-08

## 2023-11-08 RX ORDER — AZITHROMYCIN 500 MG/1
500 TABLET, FILM COATED ORAL EVERY 24 HOURS
Status: DISCONTINUED | OUTPATIENT
Start: 2023-11-09 | End: 2023-11-11 | Stop reason: HOSPADM

## 2023-11-08 RX ORDER — EZETIMIBE 10 MG/1
10 TABLET ORAL 3 TIMES WEEKLY
Status: DISCONTINUED | OUTPATIENT
Start: 2023-11-08 | End: 2023-11-11 | Stop reason: HOSPADM

## 2023-11-08 RX ORDER — METHYLPREDNISOLONE SODIUM SUCCINATE 40 MG/ML
40 INJECTION, POWDER, LYOPHILIZED, FOR SOLUTION INTRAMUSCULAR; INTRAVENOUS EVERY 8 HOURS
Status: DISCONTINUED | OUTPATIENT
Start: 2023-11-09 | End: 2023-11-10

## 2023-11-08 RX ORDER — HEPARIN SODIUM 5000 [USP'U]/ML
5000 INJECTION, SOLUTION INTRAVENOUS; SUBCUTANEOUS EVERY 8 HOURS SCHEDULED
Status: DISCONTINUED | OUTPATIENT
Start: 2023-11-08 | End: 2023-11-09

## 2023-11-08 RX ORDER — LABETALOL HYDROCHLORIDE 5 MG/ML
10 INJECTION, SOLUTION INTRAVENOUS EVERY 6 HOURS PRN
Status: DISCONTINUED | OUTPATIENT
Start: 2023-11-08 | End: 2023-11-11 | Stop reason: HOSPADM

## 2023-11-08 RX ORDER — CEFTRIAXONE 1 G/50ML
1000 INJECTION, SOLUTION INTRAVENOUS ONCE
Status: COMPLETED | OUTPATIENT
Start: 2023-11-08 | End: 2023-11-08

## 2023-11-08 RX ORDER — METOPROLOL TARTRATE 50 MG/1
50 TABLET, FILM COATED ORAL DAILY
Status: DISCONTINUED | OUTPATIENT
Start: 2023-11-09 | End: 2023-11-08

## 2023-11-08 RX ORDER — FUROSEMIDE 20 MG/1
20 TABLET ORAL DAILY
Status: ON HOLD | COMMUNITY
End: 2023-11-11 | Stop reason: CLARIF

## 2023-11-08 RX ORDER — LEVALBUTEROL INHALATION SOLUTION 1.25 MG/3ML
1.25 SOLUTION RESPIRATORY (INHALATION)
Status: DISCONTINUED | OUTPATIENT
Start: 2023-11-08 | End: 2023-11-08

## 2023-11-08 RX ORDER — IPRATROPIUM BROMIDE AND ALBUTEROL SULFATE 2.5; .5 MG/3ML; MG/3ML
3 SOLUTION RESPIRATORY (INHALATION) ONCE
Status: DISCONTINUED | OUTPATIENT
Start: 2023-11-08 | End: 2023-11-08

## 2023-11-08 RX ORDER — FLUTICASONE FUROATE AND VILANTEROL 100; 25 UG/1; UG/1
1 POWDER RESPIRATORY (INHALATION) DAILY
Status: DISCONTINUED | OUTPATIENT
Start: 2023-11-09 | End: 2023-11-09

## 2023-11-08 RX ADMIN — EZETIMIBE 10 MG: 10 TABLET ORAL at 21:09

## 2023-11-08 RX ADMIN — FUROSEMIDE 20 MG: 10 INJECTION, SOLUTION INTRAMUSCULAR; INTRAVENOUS at 21:09

## 2023-11-08 RX ADMIN — METOPROLOL TARTRATE 50 MG: 50 TABLET ORAL at 21:10

## 2023-11-08 RX ADMIN — LEVALBUTEROL HYDROCHLORIDE 1.25 MG: 1.25 SOLUTION RESPIRATORY (INHALATION) at 22:13

## 2023-11-08 RX ADMIN — MONTELUKAST 10 MG: 10 TABLET, FILM COATED ORAL at 21:10

## 2023-11-08 RX ADMIN — Medication 12 ML: at 15:22

## 2023-11-08 RX ADMIN — MAGNESIUM SULFATE IN DEXTROSE 1 G: 10 INJECTION, SOLUTION INTRAVENOUS at 22:18

## 2023-11-08 RX ADMIN — AZITHROMYCIN MONOHYDRATE 500 MG: 500 INJECTION, POWDER, LYOPHILIZED, FOR SOLUTION INTRAVENOUS at 16:31

## 2023-11-08 RX ADMIN — HEPARIN SODIUM 5000 UNITS: 5000 INJECTION INTRAVENOUS; SUBCUTANEOUS at 21:09

## 2023-11-08 RX ADMIN — IPRATROPIUM BROMIDE 1 MG: 0.5 SOLUTION RESPIRATORY (INHALATION) at 15:23

## 2023-11-08 RX ADMIN — IOHEXOL 85 ML: 350 INJECTION, SOLUTION INTRAVENOUS at 19:11

## 2023-11-08 RX ADMIN — CEFTRIAXONE 1000 MG: 1 INJECTION, SOLUTION INTRAVENOUS at 16:04

## 2023-11-08 RX ADMIN — ALBUTEROL SULFATE 10 MG: 2.5 SOLUTION RESPIRATORY (INHALATION) at 15:23

## 2023-11-08 RX ADMIN — GUAIFENESIN 1200 MG: 600 TABLET ORAL at 21:09

## 2023-11-08 RX ADMIN — FUROSEMIDE 20 MG: 10 INJECTION, SOLUTION INTRAMUSCULAR; INTRAVENOUS at 22:16

## 2023-11-08 RX ADMIN — IPRATROPIUM BROMIDE 0.5 MG: 0.5 SOLUTION RESPIRATORY (INHALATION) at 22:13

## 2023-11-08 NOTE — ASSESSMENT & PLAN NOTE
With pulmonology as outpatient. On Trelegy Ellipta, albuterol as needed, Singulair, nebulizer as home medication  Patient denied worsening cough. Shortness of breath in the past 5 days.   We will treat as COPD exacerbation for now

## 2023-11-08 NOTE — ASSESSMENT & PLAN NOTE
POA with tachycardia and tachypnea likely in the setting of respiratory distress  Does not meet sepsis criteria.

## 2023-11-08 NOTE — ASSESSMENT & PLAN NOTE
Blood pressure intermittently elevated secondary to respiratory distress also patient did not take metoprolol this morning  Continue metoprolol 50 mg daily  Monitor blood pressure.   As needed labetalol for blood pressure greater than 180 mmHg

## 2023-11-08 NOTE — H&P
[id number]  H&P  Name: Anny Ma 80 y.o. male I MRN: [id number]  Unit/Bed#: ED 08 I Date of Admission: 11/8/2023   Date of Service: 11/8/2023 I Hospital Day: 0      Assessment/Plan   * Acute respiratory failure with hypoxia [facility])  Assessment & Plan  Patient reported shortness of breath in the past 5 days. He was seen by outpatient provider was started on levofloxacin and prednisone however he was supposed to get his treatment at the pharmacy today, and he had suddenly worsening shortness of breath. Was transferred via EMS to the hospital.  Per EMS oxygen saturation 88 to 89% but improved with breathing treatments. Received 125 mg Solu-Medrol prehospital.  Patient currently on 2 L nasal cannula oxygen saturating 94%  Unclear etiology for patient's respiratory distress. Differentials include COPD exacerbation post COVID-19 infection, PE, pneumonia. Unlikely heart failure. Patient also has history of asbestosis. Chest x-ray showed extensive bilateral calcified pleural plaques, increased opacification of the right medial lung base, which may represent atelectasis or infection/inflammation, increased blunting of the right CP angle, possibly a small pleural effusion versus pleural thickening. Procalcitonin negative, no leukocytosis. Tested positive for COVID but he had COVID a month ago. BNP slightly elevated, patient does not appear to be fluid overload      Plan:  Admit under COPD exacerbation protocol  Continue azithromycin and ceftriaxone for now. If procalcitonin x2 negative, discontinue ceftriaxone  Lasix 20 mg IV once  Check CTA to rule out PE  Continue NC oxygen to maintain saturations >88%  Check echo  Pulmonology consult     SIRS (systemic inflammatory response syndrome) (HCC)  Assessment & Plan  POA with tachycardia and tachypnea likely in the setting of respiratory distress  Does not meet sepsis criteria.     COVID-19 virus infection  Assessment & Plan  Patient tested positive for COVID on 11/8 however patient reported he had a positive test a month ago or so. Except shortness of breath patient has no other symptoms COVID-related. Given the fact that patient had COVID a month ago unlikely he has reinfection. 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 He followed with hematology oncology in 2021  Currently not on any anticoagulation. Given the patient had recent COVID infection and his history of DVT/PE will check CTA to rule out PE    COPD (chronic obstructive pulmonary disease) (HCC)  Assessment & Plan  With pulmonology as outpatient. On Trelegy Ellipta, albuterol as needed, Singulair, nebulizer as home medication  Patient denied worsening cough. Shortness of breath in the past 5 days. We will treat as COPD exacerbation for now    Essential hypertension  Assessment & Plan  Blood pressure intermittently elevated secondary to respiratory distress also patient did not take metoprolol this morning  Continue metoprolol 50 mg daily  Monitor blood pressure. As needed labetalol for blood pressure greater than 180 mmHg         VTE Pharmacologic Prophylaxis: VTE Score: 9 High Risk (Score >/= 5) - Pharmacological DVT Prophylaxis Ordered: heparin. Sequential Compression Devices Ordered. Code Status: Level 1 - Full Code   Discussion with family:  called daughter in the patient's room ,she did not answer. Anticipated Length of Stay: Patient will be admitted on an inpatient basis with an anticipated length of stay of greater than 2 midnights secondary to acute respiratory failure . Total Time Spent on Date of Encounter in care of patient: 60 mins.  This time was spent on one or more of the following: performing physical exam; counseling and coordination of care; obtaining or reviewing history; documenting in the medical record; reviewing/ordering tests, medications or procedures; communicating with other healthcare professionals and discussing with patient's family/caregivers. Chief Complaint: shortness of breath     History of Present Illness:  Randee Richards is a 80 y.o. male with a PMH of asbestosis, COPD, hypertension, hyperlipidemia who presents with shortness of breath. Patient reported he had COVID about a month ago, 4 to 5 days ago he started to have shortness of breath. He was seen by outpatient provider who ordered for him steroids and levofloxacin however today while he was at the pharmacy to  his medication patient had acute shortness of breath. EMS was called and patient was brought to the hospital.  Patient shortness of breath slightly improved with breathing treatments. Review of Systems:  Review of Systems   Constitutional:  Negative for chills and fever. Respiratory:  Positive for cough and shortness of breath. Negative for chest tightness. Cardiovascular:  Negative for chest pain and palpitations. Gastrointestinal:  Negative for abdominal pain, constipation, diarrhea, nausea and vomiting. Neurological:  Negative for dizziness, light-headedness and headaches. All other systems reviewed and are negative. Past Medical and Surgical History:   Past Medical History:   Diagnosis Date    Arthritis     Cardiac disease     heart attck 10 years ago    COPD (chronic obstructive pulmonary disease) ([address])        Past Surgical History:   Procedure Laterality Date    CARDIAC SURGERY      stent placement    EYE SURGERY      bilateral cataract surgery    VASCULAR SURGERY      legs       Meds/Allergies:  Prior to Admission medications    Medication Sig Start Date End Date Taking?  Authorizing Provider   ipratropium-albuterol (DUO-NEB) 0.5-2.5 mg/3 mL nebulizer solution  7/5/23  Yes Historical Provider, MD   metoprolol tartrate (LOPRESSOR) 50 mg tablet Take 50 mg by mouth daily     Yes Historical Provider, MD Ramon Borden 810-33.9-34 MCG/ACT inhaler 1 puff daily 6/26/23  Yes Historical Provider, MD   albuterol (ProAir HFA) 90 mcg/act inhaler 6 (six) times a day    Historical Provider, MD WINSTON BUTCHER 62.5-25 MCG/INH inhaler INHALE 1 PUFF BY MOUTH ONCE A DAY - RINSE MOUTH AFTER USE  Patient not taking: Reported on 11/8/2023 7/5/18   Historical Provider, MD   aspirin 325 mg tablet Take 325 mg by mouth daily. Patient not taking: Reported on 6/14/2023    Historical Provider, MD   betamethasone dipropionate (DIPROSONE) 0.05 % cream Apply topically daily  Patient not taking: Reported on 7/6/2023 6/14/23   Obdulio Miles DPM   cholestyramine (QUESTRAN) 4 g packet Take 1 packet (4 g total) by mouth daily at bedtime  Patient not taking: Reported on 6/14/2023 12/9/21   ERICA Hernandez   Coenzyme Q10 (SM CoQ-10) 50 MG CAPS Take by mouth  Patient not taking: Reported on 6/14/2023    Historical Provider, MD   ezetimibe (ZETIA) 10 mg tablet Take 10 mg by mouth 3 (three) times a week. Historical Provider, MD   Fish Oil-Krill Oil ([address] [place] [address]) CAPS Take by mouth  Patient not taking: Reported on 12/9/2021    Historical Provider, MD   furosemide (LASIX) 20 mg tablet 20 mg daily    Historical Provider, MD   rivaroxaban (Xarelto) 15 mg tablet every 24 hours  Patient not taking: Reported on 11/8/2023    Historical Provider, MD     I have reviewed home medications with patient personally. Allergies:    Allergies   Allergen Reactions    Niacin Other (See Comments)       Social History:  Marital Status: Single   Occupation:   Patient Pre-hospital Living Situation: Home  Patient Pre-hospital Level of Mobility: walks with cane  Patient Pre-hospital Diet Restrictions: none   Substance Use History:   Social History     Substance and Sexual Activity   Alcohol Use Yes    Comment: occasional     Social History     Tobacco Use   Smoking Status Former    Types: Cigarettes   Smokeless Tobacco Never     Social History Substance and Sexual Activity   Drug Use No       Family History:  Family History   Problem Relation Age of Onset    Colon cancer Neg Hx     Colon polyps Neg Hx        Physical Exam:     Vitals:   Blood Pressure: (!) 183/113 (11/08/23 1700)  Pulse: (!) 108 (11/08/23 1749)  Temperature: 98.7 °F (37.1 °C) (11/08/23 1518)  Temp Source: Axillary (11/08/23 1518)  Respirations: (!) 26 (11/08/23 1749)  SpO2: 95 % (11/08/23 1749)    Physical Exam  Vitals and nursing note reviewed. Constitutional:       General: He is in acute distress. Appearance: He is obese. He is not diaphoretic. HENT:      Head: Normocephalic. Eyes:      General:         Right eye: No discharge. Left eye: No discharge. Cardiovascular:      Rate and Rhythm: Regular rhythm. Tachycardia present. Comments: Difficult to appreciate 2/2 respiratory rhonchi and distress   Pulmonary:      Breath sounds: Rhonchi present. No wheezing or rales. Comments: Conversational dyspnea   Abdominal:      General: There is distension. Palpations: Abdomen is soft. Tenderness: There is no abdominal tenderness. There is no guarding or rebound. Musculoskeletal:      Cervical back: Normal range of motion. Right lower leg: No edema. Left lower leg: No edema. Skin:     General: Skin is warm. Neurological:      Mental Status: He is alert and oriented to person, place, and time. Psychiatric:         Mood and Affect: Mood normal.         Behavior: Behavior normal.         Thought Content:  Thought content normal.         Judgment: Judgment normal.          Additional Data:     Lab Results:  Results from last 7 days   Lab Units 11/08/23  1538   WBC Thousand/uL 9.24   HEMOGLOBIN g/dL 13.8   HEMATOCRIT % 42.8   PLATELETS Thousands/uL 311   NEUTROS PCT % 77*   LYMPHS PCT % 9*   MONOS PCT % 8   EOS PCT % 5     Results from last 7 days   Lab Units 11/08/23  1538   SODIUM mmol/L 138   POTASSIUM mmol/L 4.4   CHLORIDE mmol/L 106 CO2 mmol/L 24   BUN mg/dL 14   CREATININE mg/dL 0.96   ANION GAP mmol/L 8   CALCIUM mg/dL 9.1   ALBUMIN g/dL 4.0   TOTAL BILIRUBIN mg/dL 0.62   ALK PHOS U/L 83   ALT U/L 21   AST U/L 19   GLUCOSE RANDOM mg/dL 115     Results from last 7 days   Lab Units 11/08/23  1538   INR  1.12             Results from last 7 days   Lab Units 11/08/23  1538   LACTIC ACID mmol/L 1.2   PROCALCITONIN ng/ml 0.07       Lines/Drains:  Invasive Devices       Peripheral Intravenous Line  Duration             Peripheral IV 11/08/23 Left Forearm <1 day    Peripheral IV 11/08/23 Proximal;Right;Ventral (anterior) Forearm <1 day                        Imaging: Reviewed radiology reports from this admission including: chest xray and Personally reviewed the following imaging: chest xray  XR chest 1 view portable   Final Result by Jermaine Brown MD (11/08 1707)      1. Extensive bilateral calcified pleural plaques. 2. Increased opacification of the right medial lung base, which may represent atelectasis or infection/inflammation. 3. Increased blunting of the right CP angle, possibly a small pleural effusion versus pleural thickening. If clinically warranted, this could be further assessed with CT of the chest.            Workstation performed: [id number]         CTA chest pe study    (Results Pending)       EKG and Other Studies Reviewed on Admission:   EKG: Sinus Tachycardia. . ** Please Note: This note has been constructed using a voice recognition system.  **

## 2023-11-08 NOTE — ED PROVIDER NOTES
History  Chief Complaint   Patient presents with    Shortness of Breath     Cough since Saturday. Seen by PCP today and prescribed prednisone and abx. Went to pharmacy to p/u prescriptions and became very SOB. 89% on RA upon EMS arrival. Audible wheezxing upon arrival. Duoneb and 125mg solumedrol given en route. Is an 70-year-old male with history of CAD, COPD that presents for evaluation of respiratory distress. Patient saw his PCP earlier today and was prescribed prednisone and antibiotics for a COPD exacerbation. He was at the pharmacy and became acutely dyspneic. He says over the past 3 to 4 days he has been increasing dyspneic with nonproductive significant cough. He endorses some chest tightness without chest pain at this time. He denies nausea vomiting or diaphoresis. He does endorse some diaphoresis intermittently over the past couple days but unknown if he has had any fevers. He denies abdominal pain urinary or bowel symptoms. He is vaccinated against COVID-19. Patient says that he feels better after getting a nebulizer otherwise and no he is still having significant increased work of breathing. Patient noted to be 89% on room air by EMS, he does not wear oxygen at baseline. Prior to Admission Medications   Prescriptions Last Dose Informant Patient Reported? Taking?    ANORO ELLIPTA 62.5-25 MCG/INH inhaler Not Taking Self Yes No   Sig: INHALE 1 PUFF BY MOUTH ONCE A DAY - RINSE MOUTH AFTER USE   Patient not taking: Reported on 2023   Coenzyme Q10 (SM CoQ-10) 50 MG CAPS  Self Yes No   Sig: Take by mouth   Patient not taking: Reported on 2023   Fish Oil-Krill Oil (KRILL OIL PLUS) CAPS  Self Yes No   Sig: Take by mouth   Patient not taking: Reported on 2021   Trelegy Ellipta 100-62.5-25 MCG/ACT inhaler 2023  Yes Yes   Si puff daily   albuterol (ProAir HFA) 90 mcg/act inhaler   Yes No   Si (six) times a day   aspirin 325 mg tablet  Self Yes No   Sig: Take 325 mg by mouth daily. Patient not taking: Reported on 2023   betamethasone dipropionate (DIPROSONE) 0.05 % cream   No No   Sig: Apply topically daily   Patient not taking: Reported on 2023   cholestyramine (QUESTRAN) 4 g packet  Self No No   Sig: Take 1 packet (4 g total) by mouth daily at bedtime   Patient not taking: Reported on 2023   ezetimibe (ZETIA) 10 mg tablet  Self Yes No   Sig: Take 10 mg by mouth 3 (three) times a week. furosemide (LASIX) 20 mg tablet   Yes No   Si mg daily   ipratropium-albuterol (DUO-NEB) 0.5-2.5 mg/3 mL nebulizer solution 2023  Yes Yes   metoprolol tartrate (LOPRESSOR) 50 mg tablet 2023 Self Yes Yes   Sig: Take 50 mg by mouth daily     rivaroxaban (Xarelto) 15 mg tablet Not Taking  Yes No   Sig: every 24 hours   Patient not taking: Reported on 2023      Facility-Administered Medications: None       Past Medical History:   Diagnosis Date    Arthritis     Cardiac disease     heart attck 10 years ago    COPD (chronic obstructive pulmonary disease) (HCC)        Past Surgical History:   Procedure Laterality Date    CARDIAC SURGERY      stent placement    EYE SURGERY      bilateral cataract surgery    VASCULAR SURGERY      legs       Family History   Problem Relation Age of Onset    Colon cancer Neg Hx     Colon polyps Neg Hx      I have reviewed and agree with the history as documented. E-Cigarette/Vaping    E-Cigarette Use Never User      E-Cigarette/Vaping Substances    Nicotine No     THC No     CBD No     Flavoring No     Other No     Unknown No      Social History     Tobacco Use    Smoking status: Former     Types: Cigarettes    Smokeless tobacco: Never   Vaping Use    Vaping Use: Never used   Substance Use Topics    Alcohol use: Yes     Comment: occasional    Drug use: No       Review of Systems   Constitutional:  Positive for chills and fatigue. Negative for fever. HENT:  Negative for sore throat. Eyes:  Negative for photophobia. Respiratory:  Positive for cough, chest tightness, shortness of breath and wheezing. Cardiovascular:  Negative for chest pain. Gastrointestinal:  Negative for abdominal pain. Genitourinary:  Negative for dysuria. Musculoskeletal:  Negative for back pain. Skin:  Negative for rash. Neurological:  Negative for light-headedness. Hematological:  Negative for adenopathy. Psychiatric/Behavioral:  Negative for agitation. All other systems reviewed and are negative. Physical Exam  Physical Exam  Vitals reviewed. Constitutional:       General: He is not in acute distress. Appearance: He is well-developed. HENT:      Head: Normocephalic. Eyes:      Pupils: Pupils are equal, round, and reactive to light. Cardiovascular:      Rate and Rhythm: Normal rate and regular rhythm. Heart sounds: Normal heart sounds. No murmur heard. No friction rub. No gallop. Pulmonary:      Comments: Significant tachypnea and increased work of breathing noted. Wheezing heard throughout, decreased breath sounds. Abdominal:      General: Bowel sounds are normal. There is no distension. Palpations: Abdomen is soft. Tenderness: There is no abdominal tenderness. There is no guarding. Musculoskeletal:         General: Normal range of motion. Cervical back: Normal range of motion and neck supple. Skin:     Capillary Refill: Capillary refill takes less than 2 seconds. Neurological:      Mental Status: He is alert and oriented to person, place, and time. Cranial Nerves: No cranial nerve deficit. Sensory: No sensory deficit. Motor: No abnormal muscle tone. Psychiatric:         Behavior: Behavior normal.         Thought Content:  Thought content normal.         Judgment: Judgment normal.         Vital Signs  ED Triage Vitals   Temperature Pulse Respirations Blood Pressure SpO2   11/08/23 1518 11/08/23 1516 11/08/23 1516 11/08/23 1518 11/08/23 1516   98.7 °F (37.1 °C) (!) 117 (!) 38 (!) 232/107 97 %      Temp Source Heart Rate Source Patient Position - Orthostatic VS BP Location FiO2 (%)   11/08/23 1518 11/08/23 1516 11/08/23 1516 11/08/23 1516 --   Axillary Monitor Sitting Left arm       Pain Score       11/08/23 1516       No Pain           Vitals:    11/08/23 1653 11/08/23 1700 11/08/23 1744 11/08/23 1749   BP: 160/86 (!) 183/113     Pulse: 102 105 (!) 112 (!) 108   Patient Position - Orthostatic VS:  Lying           Visual Acuity      ED Medications  Medications   ezetimibe (ZETIA) tablet 10 mg (has no administration in time range)   Fluticasone Furoate-Vilanterol 100-25 mcg/actuation 1 puff (has no administration in time range)     And   umeclidinium 62.5 mcg/actuation inhaler AEPB 1 puff (has no administration in time range)   acetaminophen (TYLENOL) tablet 650 mg (has no administration in time range)   guaiFENesin (MUCINEX) 12 hr tablet 1,200 mg (has no administration in time range)   heparin (porcine) subcutaneous injection 5,000 Units (has no administration in time range)   azithromycin (ZITHROMAX) tablet 500 mg (has no administration in time range)   cefTRIAXone (ROCEPHIN) IVPB (premix in dextrose) 1,000 mg 50 mL (has no administration in time range)   methylPREDNISolone sodium succinate (Solu-MEDROL) injection 40 mg (has no administration in time range)   labetalol (NORMODYNE) injection 10 mg (has no administration in time range)   metoprolol tartrate (LOPRESSOR) tablet 50 mg (has no administration in time range)   montelukast (SINGULAIR) tablet 10 mg (has no administration in time range)   furosemide (LASIX) injection 20 mg (has no administration in time range)   ipratropium (ATROVENT) 0.02 % inhalation solution 0.5 mg (has no administration in time range)   levalbuterol (XOPENEX) inhalation solution 1.25 mg (has no administration in time range)   albuterol inhalation solution 10 mg (10 mg Nebulization Given 11/8/23 1523)   ipratropium (ATROVENT) 0.02 % inhalation solution 1 mg (1 mg Nebulization Given 11/8/23 1523)   sodium chloride 0.9 % inhalation solution 12 mL (12 mL Nebulization Given 11/8/23 1522)   ipratropium-albuterol (FOR EMS ONLY) (DUO-NEB) 0.5-2.5 mg/3 mL inhalation solution 3 mL (0 mL Does not apply Given to EMS 11/8/23 1525)   methylPREDNISolone sodium succinate (FOR EMS ONLY) (Solu-MEDROL) 125 MG injection 125 mg (0 mg Does not apply Given to EMS 11/8/23 1525)   cefTRIAXone (ROCEPHIN) IVPB (premix in dextrose) 1,000 mg 50 mL (0 mg Intravenous Stopped 11/8/23 1631)   azithromycin (ZITHROMAX) 500 mg in sodium chloride 0.9% 250mL IVPB 500 mg (500 mg Intravenous New Bag 11/8/23 1631)       Diagnostic Studies  Results Reviewed       Procedure Component Value Units Date/Time    C-reactive protein [[id number]]  (Abnormal) Collected: 11/08/23 1538    Lab Status: Final result Specimen: Blood from Arm, Right Updated: 11/08/23 1856     CRP 7.9 mg/L     D-dimer, quantitative [[id number]]  (Abnormal) Collected: 11/08/23 1538    Lab Status: Final result Specimen: Blood from Arm, Right Updated: 11/08/23 1840     D-Dimer, Quant 1.31 ug/ml FEU     Narrative: In the evaluation for possible pulmonary embolism, in the appropriate (Well's Score of 4 or less) patient, the age adjusted d-dimer cutoff for this patient can be calculated as:    Age x 0.01 (in ug/mL) for Age-adjusted D-dimer exclusion threshold for a patient over 50 years.     HS Troponin 0hr (reflex protocol) [[id number]]     Lab Status: No result Specimen: Blood     Procalcitonin [[id number]]  (Normal) Collected: 11/08/23 1538    Lab Status: Final result Specimen: Blood from Arm, Right Updated: 11/08/23 1619     Procalcitonin 0.07 ng/ml     B-Type Natriuretic Peptide(BNP) [[id number]]  (Abnormal) Collected: 11/08/23 1538    Lab Status: Final result Specimen: Blood from Arm, Right Updated: 11/08/23 1615      pg/mL     Lactic acid [[id number]]  (Normal) Collected: 11/08/23 1538    Lab Status: Final result Specimen: Blood from Arm, Right Updated: 11/08/23 1613     LACTIC ACID 1.2 mmol/L     Narrative:      Result may be elevated if tourniquet was used during collection.     Comprehensive metabolic panel [[id number]] Collected: 11/08/23 1538    Lab Status: Final result Specimen: Blood from Arm, Right Updated: 11/08/23 1613     Sodium 138 mmol/L      Potassium 4.4 mmol/L      Chloride 106 mmol/L      CO2 24 mmol/L      ANION GAP 8 mmol/L      BUN 14 mg/dL      Creatinine 0.96 mg/dL      Glucose 115 mg/dL      Calcium 9.1 mg/dL      AST 19 U/L      ALT 21 U/L      Alkaline Phosphatase 83 U/L      Total Protein 7.4 g/dL      Albumin 4.0 g/dL      Total Bilirubin 0.62 mg/dL      eGFR 72 ml/min/1.73sq m     Narrative:      [facility] guidelines for Chronic Kidney Disease (CKD):     Stage 1 with normal or high GFR (GFR > 90 mL/min/1.73 square meters)    Stage 2 Mild CKD (GFR = 60-89 mL/min/1.73 square meters)    Stage 3A Moderate CKD (GFR = 45-59 mL/min/1.73 square meters)    Stage 3B Moderate CKD (GFR = 30-44 mL/min/1.73 square meters)    Stage 4 Severe CKD (GFR = 15-29 mL/min/1.73 square meters)    Stage 5 End Stage CKD (GFR <15 mL/min/1.73 square meters)  Note: GFR calculation is accurate only with a steady state creatinine    Protime-INR [[id number]]  (Abnormal) Collected: 11/08/23 1538    Lab Status: Final result Specimen: Blood from Arm, Right Updated: 11/08/23 1613     Protime 14.8 seconds      INR 1.12    APTT [[id number]]  (Normal) Collected: 11/08/23 1538    Lab Status: Final result Specimen: Blood from Arm, Right Updated: 11/08/23 1613     PTT 29 seconds     FLU/RSV/COVID - if FLU/RSV clinically relevant [[id number]]  (Abnormal) Collected: 11/08/23 1519    Lab Status: Final result Specimen: Nares from Nose Updated: 11/08/23 1605     SARS-CoV-2 Positive     INFLUENZA A PCR Negative     INFLUENZA B PCR Negative     RSV PCR Negative    Narrative:      FOR PEDIATRIC PATIENTS - copy/paste COVID Guidelines URL to browser: [email or web address]    SARS-CoV-2 assay is a Nucleic Acid Amplification assay intended for the  qualitative detection of nucleic acid from SARS-CoV-2 in nasopharyngeal  swabs. Results are for the presumptive identification of SARS-CoV-2 RNA. Positive results are indicative of infection with SARS-CoV-2, the virus  causing COVID-19, but do not rule out bacterial infection or co-infection  with other viruses. Laboratories within the [facility] and its  territories are required to report all positive results to the appropriate  public health authorities. Negative results do not preclude SARS-CoV-2  infection and should not be used as the sole basis for treatment or other  patient management decisions. Negative results must be combined with  clinical observations, patient history, and epidemiological information. This test has not been FDA cleared or approved. This test has been authorized by FDA under an Emergency Use Authorization  (EUA). This test is only authorized for the duration of time the  declaration that circumstances exist justifying the authorization of the  emergency use of an in vitro diagnostic tests for detection of SARS-CoV-2  virus and/or diagnosis of COVID-19 infection under section 564(b)(1) of  the Act, 21 U. S.C. 183VMT-3(W)(1), unless the authorization is terminated  or revoked sooner. The test has been validated but independent review by FDA  and CLIA is pending. Test performed using [employer] GeneXpert: This RT-PCR assay targets N2,  a region unique to SARS-CoV-2. A conserved region in the E-gene was chosen  for pan-Sarbecovirus detection which includes SARS-CoV-2. According to CMS-2020-01-R, this platform meets the definition of high-throughput technology.     CBC and differential [[id number]]  (Abnormal) Collected: 11/08/23 1538    Lab Status: Final result Specimen: Blood from Arm, Right Updated: 11/08/23 1551     WBC 9.24 Thousand/uL      RBC 4.72 Million/uL      Hemoglobin 13.8 g/dL      Hematocrit 42.8 %      MCV 91 fL      MCH 29.2 pg      MCHC 32.2 g/dL      RDW 13.0 %      MPV 8.8 fL      Platelets 352 Thousands/uL      nRBC 0 /100 WBCs      Neutrophils Relative 77 %      Immat GRANS % 0 %      Lymphocytes Relative 9 %      Monocytes Relative 8 %      Eosinophils Relative 5 %      Basophils Relative 1 %      Neutrophils Absolute 7.14 Thousands/µL      Immature Grans Absolute 0.03 Thousand/uL      Lymphocytes Absolute 0.79 Thousands/µL      Monocytes Absolute 0.76 Thousand/µL      Eosinophils Absolute 0.46 Thousand/µL      Basophils Absolute 0.06 Thousands/µL     Blood culture #2 [[id number]] Collected: 11/08/23 1538    Lab Status: In process Specimen: Blood from Arm, Right Updated: 11/08/23 1546    Blood culture #1 [[id number]] Collected: 11/08/23 1538    Lab Status: In process Specimen: Blood from Arm, Left Updated: 11/08/23 1545    UA w Reflex to Microscopic w Reflex to Culture [[id number]]     Lab Status: No result Specimen: Urine                    XR chest 1 view portable   Final Result by Saima Vivar MD (11/08 1707)      1. Extensive bilateral calcified pleural plaques. 2. Increased opacification of the right medial lung base, which may represent atelectasis or infection/inflammation. 3. Increased blunting of the right CP angle, possibly a small pleural effusion versus pleural thickening.       If clinically warranted, this could be further assessed with CT of the chest.            Workstation performed: [id number]         CTA chest pe study    (Results Pending)              Procedures  CriticalCare Time    Date/Time: 11/8/2023 7:01 PM    Performed by: Rupa Gomez MD  Authorized by: Rupa Gomez MD    Critical care provider statement:     Critical care time (minutes):  32    Critical care was necessary to treat or prevent imminent or life-threatening deterioration of the following conditions: Respiratory failure    Critical care was time spent personally by me on the following activities:  Blood draw for specimens, obtaining history from patient or surrogate, development of treatment plan with patient or surrogate, evaluation of patient's response to treatment, examination of patient, review of old charts, re-evaluation of patient's condition, ordering and review of radiographic studies, ordering and review of laboratory studies and ordering and performing treatments and interventions    I assumed direction of critical care for this patient from another provider in my specialty: no    Comments:      Respiratory distress requiring hour-long nebulizer  ECG 12 Lead Documentation Only    Date/Time: 11/8/2023 7:01 PM    Performed by: Lauren Disla MD  Authorized by: Lauren Disla MD    ECG reviewed by me, the ED Provider: yes    Patient location:  ED  Previous ECG:     Previous ECG:  Compared to current    Similarity:  No change    Comparison to cardiac monitor: Yes    Interpretation:     Interpretation: non-specific    Rate:     ECG rate assessment: normal    Rhythm:     Rhythm: sinus rhythm    Ectopy:     Ectopy: none    QRS:     QRS axis:  Left    QRS intervals: Wide  Conduction:     Conduction: abnormal      Abnormal conduction: complete RBBB and LAFB    ST segments:     ST segments:  Normal  T waves:     T waves: normal             ED Course                               SBIRT 20yo+      Flowsheet Row Most Recent Value   Initial Alcohol Screen: US AUDIT-C     1. How often do you have a drink containing alcohol? 0 Filed at: 11/08/2023 1612   2. How many drinks containing alcohol do you have on a typical day you are drinking? 0 Filed at: 11/08/2023 1612   3a. Male UNDER 65: How often do you have five or more drinks on one occasion? 0 Filed at: 11/08/2023 1612   3b. FEMALE Any Age, or MALE 65+: How often do you have 4 or more drinks on one occassion?  0 Filed at: 11/08/2023 1612   Audit-C Score 0 Filed at: 11/08/2023 1612   MYCHAL: How many times in the past year have you. .. Used an illegal drug or used a prescription medication for non-medical reasons? Never Filed at: 11/08/2023 1612                      Medical Decision Making  Patient is an 27-year-old male who presents for evaluation of respiratory distress. Consistent with COPD exacerbation based on history and physical exam, significant wheezing noted. Hour-long nebulizer given with improvement of symptoms. Patient initially was borderline for BiPAP, he improved however did not require BiPAP. COVID swab was positive. Chest x-ray reviewed and prior asbestosis plaques seen. Blood work reviewed and unremarkable. Started with antibiotics steroids and breathing treatments with plan to admit to the hospital.    Amount and/or Complexity of Data Reviewed  Labs: ordered. Radiology: ordered. Risk  Prescription drug management. Decision regarding hospitalization. Disposition  Final diagnoses:   [id number]   COPD exacerbation ([address])     Time reflects when diagnosis was documented in both MDM as applicable and the Disposition within this note       Time User Action Codes Description Comment    11/8/2023  4:27 PM Eusebio Sheets Add [U07.1] COVID-19     11/8/2023  4:27 PM Roosevelt Noyola Add [J44.1] COPD exacerbation [facility])           ED Disposition       ED Disposition   Admit    Condition   Stable    Date/Time   Wed Nov 8, 2023 2916    Comment   Case was discussed with BELA and the patient's admission status was agreed to be Admission Status: inpatient status to the service of Dr. Bradly Carmen . Follow-up Information    None         Patient's Medications   Discharge Prescriptions    No medications on file       No discharge procedures on file.     PDMP Review       None            ED Provider  Electronically Signed by             Bong Jean MD  11/08/23 9488

## 2023-11-08 NOTE — ASSESSMENT & PLAN NOTE
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 He followed with hematology oncology in 2021  Currently not on any anticoagulation.   Given the patient had recent COVID infection and his history of DVT/PE will check CTA to rule out PE

## 2023-11-08 NOTE — ASSESSMENT & PLAN NOTE
Patient tested positive for COVID on 11/8 however patient reported he had a positive test a month ago or so. Except shortness of breath patient has no other symptoms COVID-related. Given the fact that patient had COVID a month ago unlikely he has reinfection.   Check CRP, D-dimer, BNP  Will not treat

## 2023-11-08 NOTE — RESPIRATORY THERAPY NOTE
RT Protocol Note  Jeremy Backers 80 y.o. male MRN: [id number]  Unit/Bed#: ED 08 Encounter: [id number]    Assessment    Principal Problem:    Acute respiratory failure with hypoxia [facility])  Active Problems:    Essential hypertension    COPD (chronic obstructive pulmonary disease) ([address] [facility])      Home Pulmonary Medications:Albuterol      Past Medical History:   Diagnosis Date    Arthritis     Cardiac disease     heart attck 10 years ago    COPD (chronic obstructive pulmonary disease) ([facility])      Social History     Socioeconomic History    Marital status: Single     Spouse name: None    Number of children: None    Years of education: None    Highest education level: None   Occupational History    None   Tobacco Use    Smoking status: Former     Types: Cigarettes    Smokeless tobacco: Never   Vaping Use    Vaping Use: Never used   Substance and Sexual Activity    Alcohol use: Yes     Comment: occasional    Drug use: No    Sexual activity: None   Other Topics Concern    None   Social History Narrative    None     Social Determinants of Health     Financial Resource Strain: Not on file   Food Insecurity: Not on file   Transportation Needs: Not on file   Physical Activity: Not on file   Stress: Not on file   Social Connections: Not on file   Intimate Partner Violence: Not on file   Housing Stability: Not on file       Subjective         Objective    Physical Exam:   Assessment Type: Assess only  General Appearance: Awake  Respiratory Pattern: Spontaneous  Chest Assessment: Chest expansion symmetrical  Bilateral Breath Sounds: Inspiratory wheezes  Cough: None    Vitals:  Blood pressure (!) 183/113, pulse (!) 112, temperature 98.7 °F (37.1 °C), temperature source Axillary, resp. rate (!) 30, SpO2 96 %. Imaging and other studies:           Plan    Respiratory Plan:  Moderate/Severe Distress pathway

## 2023-11-08 NOTE — ASSESSMENT & PLAN NOTE
Patient reported shortness of breath in the past 5 days. He was seen by outpatient provider was started on levofloxacin and prednisone however he was supposed to get his treatment at the pharmacy today, and he had suddenly worsening shortness of breath. Was transferred via EMS to the hospital.  Per EMS oxygen saturation 88 to 89% but improved with breathing treatments. Received 125 mg Solu-Medrol prehospital.  Patient currently on 2 L nasal cannula oxygen saturating 94%  Unclear etiology for patient's respiratory distress. Differentials include COPD exacerbation post COVID-19 infection, PE, pneumonia. Unlikely heart failure. Patient also has history of asbestosis. Chest x-ray showed extensive bilateral calcified pleural plaques, increased opacification of the right medial lung base, which may represent atelectasis or infection/inflammation, increased blunting of the right CP angle, possibly a small pleural effusion versus pleural thickening. Procalcitonin negative, no leukocytosis. Tested positive for COVID but he had COVID a month ago. BNP slightly elevated, patient does not appear to be fluid overload      Plan:  Admit under COPD exacerbation protocol  Continue azithromycin and ceftriaxone for now.   If procalcitonin x2 negative, discontinue ceftriaxone  Lasix 20 mg IV once  Check CTA to rule out PE  Continue NC oxygen to maintain saturations >88%  Check echo  Pulmonology consult

## 2023-11-09 ENCOUNTER — APPOINTMENT (INPATIENT)
Dept: NON INVASIVE DIAGNOSTICS | Facility: HOSPITAL | Age: 83
DRG: 189 | End: 2023-11-09
Payer: COMMERCIAL

## 2023-11-09 PROBLEM — R79.89 ELEVATED TROPONIN: Status: ACTIVE | Noted: 2023-11-09

## 2023-11-09 PROBLEM — G47.33 OSA (OBSTRUCTIVE SLEEP APNEA): Status: ACTIVE | Noted: 2023-11-09

## 2023-11-09 PROBLEM — Z86.79 HX OF CORONARY ARTERY DISEASE: Status: ACTIVE | Noted: 2023-11-09

## 2023-11-09 LAB
2HR DELTA HS TROPONIN: 435 NG/L
4HR DELTA HS TROPONIN: -667 NG/L
4HR DELTA HS TROPONIN: 5955 NG/L
ANION GAP SERPL CALCULATED.3IONS-SCNC: 6 MMOL/L
AORTIC ROOT: 3.1 CM
AORTIC VALVE MEAN VELOCITY: 20.1 M/S
APICAL FOUR CHAMBER EJECTION FRACTION: 65 %
APTT PPP: 23 SECONDS (ref 23–37)
APTT PPP: 42 SECONDS (ref 23–37)
APTT PPP: 56 SECONDS (ref 23–37)
ASCENDING AORTA: 3.3 CM
ATRIAL RATE: 103 BPM
ATRIAL RATE: 105 BPM
ATRIAL RATE: 141 BPM
ATRIAL RATE: 71 BPM
ATRIAL RATE: 76 BPM
AV AREA BY CONTINUOUS VTI: 1.1 CM2
AV AREA PEAK VELOCITY: 1 CM2
AV LVOT MEAN GRADIENT: 1 MMHG
AV LVOT PEAK GRADIENT: 3 MMHG
AV MEAN GRADIENT: 22 MMHG
AV PEAK GRADIENT: 33 MMHG
AV VALVE AREA: 0.99 CM2
AV VELOCITY RATIO: 0.28
AVA (PLAN): 1 CM2
BUN SERPL-MCNC: 19 MG/DL (ref 5–25)
CALCIUM SERPL-MCNC: 9.1 MG/DL (ref 8.4–10.2)
CARDIAC TROPONIN I PNL SERPL HS: 6086 NG/L
CARDIAC TROPONIN I PNL SERPL HS: 6155 NG/L
CARDIAC TROPONIN I PNL SERPL HS: 6753 NG/L
CARDIAC TROPONIN I PNL SERPL HS: 7188 NG/L
CHLORIDE SERPL-SCNC: 106 MMOL/L (ref 96–108)
CO2 SERPL-SCNC: 26 MMOL/L (ref 21–32)
CREAT SERPL-MCNC: 1.01 MG/DL (ref 0.6–1.3)
DOP CALC AO PEAK VEL: 2.9 M/S
DOP CALC AO VTI: 65 CM
DOP CALC LVOT AREA: 3.46 CM2
DOP CALC LVOT CARDIAC INDEX: 2.05 L/MIN/M2
DOP CALC LVOT CARDIAC OUTPUT: 4.77 L/MIN
DOP CALC LVOT DIAMETER: 2.1 CM
DOP CALC LVOT PEAK VEL VTI: 18.59 CM
DOP CALC LVOT PEAK VEL: 0.81 M/S
DOP CALC LVOT STROKE INDEX: 28 ML/M2
DOP CALC LVOT STROKE VOLUME: 64.36 CM3
DOP CALC MV VTI: 28.91 CM
E WAVE DECELERATION TIME: 357 MS
E/A RATIO: 0.75
ERYTHROCYTE [DISTWIDTH] IN BLOOD BY AUTOMATED COUNT: 13.1 % (ref 11.6–15.1)
ERYTHROCYTE [DISTWIDTH] IN BLOOD BY AUTOMATED COUNT: 13.2 % (ref 11.6–15.1)
FRACTIONAL SHORTENING: 28 (ref 28–44)
GFR SERPL CREATININE-BSD FRML MDRD: 68 ML/MIN/1.73SQ M
GLUCOSE SERPL-MCNC: 133 MG/DL (ref 65–140)
GLUCOSE SERPL-MCNC: 139 MG/DL (ref 65–140)
HCT VFR BLD AUTO: 42.7 % (ref 36.5–49.3)
HCT VFR BLD AUTO: 42.8 % (ref 36.5–49.3)
HGB BLD-MCNC: 13.8 G/DL (ref 12–17)
HGB BLD-MCNC: 13.9 G/DL (ref 12–17)
INR PPP: 1.11 (ref 0.84–1.19)
INTERVENTRICULAR SEPTUM IN DIASTOLE (PARASTERNAL SHORT AXIS VIEW): 1.4 CM
INTERVENTRICULAR SEPTUM: 1.4 CM (ref 0.6–1.1)
LAAS-AP2: 16.4 CM2
LAAS-AP4: 11.8 CM2
LEFT ATRIUM SIZE: 4.1 CM
LEFT ATRIUM VOLUME (MOD BIPLANE): 37 ML
LEFT ATRIUM VOLUME INDEX (MOD BIPLANE): 15.9 ML/M2
LEFT INTERNAL DIMENSION IN SYSTOLE: 3.1 CM (ref 2.1–4)
LEFT VENTRICLE DIASTOLIC VOLUME (MOD BIPLANE): 123 ML
LEFT VENTRICLE SYSTOLIC VOLUME (MOD BIPLANE): 55 ML
LEFT VENTRICULAR INTERNAL DIMENSION IN DIASTOLE: 4.3 CM (ref 3.5–6)
LEFT VENTRICULAR POSTERIOR WALL IN END DIASTOLE: 1.4 CM
LEFT VENTRICULAR STROKE VOLUME: 47 ML
LV EF: 55 %
LVSV (TEICH): 47 ML
MCH RBC QN AUTO: 29.1 PG (ref 26.8–34.3)
MCH RBC QN AUTO: 29.6 PG (ref 26.8–34.3)
MCHC RBC AUTO-ENTMCNC: 32.2 G/DL (ref 31.4–37.4)
MCHC RBC AUTO-ENTMCNC: 32.6 G/DL (ref 31.4–37.4)
MCV RBC AUTO: 90 FL (ref 82–98)
MCV RBC AUTO: 91 FL (ref 82–98)
MV E'TISSUE VEL-LAT: 7 CM/S
MV E'TISSUE VEL-SEP: 5 CM/S
MV MEAN GRADIENT: 1 MMHG
MV PEAK A VEL: 0.84 M/S
MV PEAK E VEL: 63 CM/S
MV PEAK GRADIENT: 5 MMHG
MV STENOSIS PRESSURE HALF TIME: 103 MS
MV VALVE AREA BY CONTINUITY EQUATION: 2.23 CM2
MV VALVE AREA P 1/2 METHOD: 2.14
P AXIS: 48 DEGREES
P AXIS: 56 DEGREES
P AXIS: 59 DEGREES
P AXIS: 67 DEGREES
P AXIS: 73 DEGREES
PLATELET # BLD AUTO: 330 THOUSANDS/UL (ref 149–390)
PLATELET # BLD AUTO: 338 THOUSANDS/UL (ref 149–390)
PMV BLD AUTO: 8.8 FL (ref 8.9–12.7)
PMV BLD AUTO: 8.8 FL (ref 8.9–12.7)
POTASSIUM SERPL-SCNC: 4.8 MMOL/L (ref 3.5–5.3)
PR INTERVAL: 130 MS
PR INTERVAL: 158 MS
PR INTERVAL: 160 MS
PR INTERVAL: 168 MS
PR INTERVAL: 176 MS
PROCALCITONIN SERPL-MCNC: 0.07 NG/ML
PROTHROMBIN TIME: 14.7 SECONDS (ref 11.6–14.5)
QRS AXIS: -39 DEGREES
QRS AXIS: -40 DEGREES
QRS AXIS: -55 DEGREES
QRS AXIS: -57 DEGREES
QRS AXIS: -63 DEGREES
QRSD INTERVAL: 130 MS
QRSD INTERVAL: 130 MS
QRSD INTERVAL: 132 MS
QT INTERVAL: 304 MS
QT INTERVAL: 372 MS
QT INTERVAL: 376 MS
QT INTERVAL: 412 MS
QT INTERVAL: 422 MS
QTC INTERVAL: 458 MS
QTC INTERVAL: 463 MS
QTC INTERVAL: 465 MS
QTC INTERVAL: 487 MS
QTC INTERVAL: 496 MS
RBC # BLD AUTO: 4.7 MILLION/UL (ref 3.88–5.62)
RBC # BLD AUTO: 4.75 MILLION/UL (ref 3.88–5.62)
RIGHT ATRIUM AREA SYSTOLE A4C: 16.5 CM2
RIGHT VENTRICLE ID DIMENSION: 3.3 CM
SL CV LEFT ATRIUM LENGTH A2C: 4 CM
SL CV LV EF: 60
SL CV PED ECHO LEFT VENTRICLE DIASTOLIC VOLUME (MOD BIPLANE) 2D: 85 ML
SL CV PED ECHO LEFT VENTRICLE SYSTOLIC VOLUME (MOD BIPLANE) 2D: 39 ML
SODIUM SERPL-SCNC: 138 MMOL/L (ref 135–147)
T WAVE AXIS: -1 DEGREES
T WAVE AXIS: -11 DEGREES
T WAVE AXIS: -2 DEGREES
T WAVE AXIS: 25 DEGREES
T WAVE AXIS: 79 DEGREES
TR MAX PG: 23 MMHG
TR PEAK VELOCITY: 2.4 M/S
TRICUSPID ANNULAR PLANE SYSTOLIC EXCURSION: 2.7 CM
TRICUSPID VALVE PEAK REGURGITATION VELOCITY: 2.41 M/S
VENTRICULAR RATE: 103 BPM
VENTRICULAR RATE: 105 BPM
VENTRICULAR RATE: 141 BPM
VENTRICULAR RATE: 71 BPM
VENTRICULAR RATE: 76 BPM
WBC # BLD AUTO: 6.22 THOUSAND/UL (ref 4.31–10.16)
WBC # BLD AUTO: 8.71 THOUSAND/UL (ref 4.31–10.16)

## 2023-11-09 PROCEDURE — 80048 BASIC METABOLIC PNL TOTAL CA: CPT | Performed by: INTERNAL MEDICINE

## 2023-11-09 PROCEDURE — 93010 ELECTROCARDIOGRAM REPORT: CPT | Performed by: INTERNAL MEDICINE

## 2023-11-09 PROCEDURE — 85610 PROTHROMBIN TIME: CPT | Performed by: PHYSICIAN ASSISTANT

## 2023-11-09 PROCEDURE — 94640 AIRWAY INHALATION TREATMENT: CPT

## 2023-11-09 PROCEDURE — 85027 COMPLETE CBC AUTOMATED: CPT | Performed by: INTERNAL MEDICINE

## 2023-11-09 PROCEDURE — 93306 TTE W/DOPPLER COMPLETE: CPT | Performed by: INTERNAL MEDICINE

## 2023-11-09 PROCEDURE — 94002 VENT MGMT INPAT INIT DAY: CPT

## 2023-11-09 PROCEDURE — 85027 COMPLETE CBC AUTOMATED: CPT | Performed by: PHYSICIAN ASSISTANT

## 2023-11-09 PROCEDURE — 85730 THROMBOPLASTIN TIME PARTIAL: CPT | Performed by: INTERNAL MEDICINE

## 2023-11-09 PROCEDURE — 84484 ASSAY OF TROPONIN QUANT: CPT | Performed by: INTERNAL MEDICINE

## 2023-11-09 PROCEDURE — 36415 COLL VENOUS BLD VENIPUNCTURE: CPT | Performed by: PHYSICIAN ASSISTANT

## 2023-11-09 PROCEDURE — 84145 PROCALCITONIN (PCT): CPT | Performed by: INTERNAL MEDICINE

## 2023-11-09 PROCEDURE — 85730 THROMBOPLASTIN TIME PARTIAL: CPT | Performed by: PHYSICIAN ASSISTANT

## 2023-11-09 PROCEDURE — 93306 TTE W/DOPPLER COMPLETE: CPT

## 2023-11-09 PROCEDURE — 99255 IP/OBS CONSLTJ NEW/EST HI 80: CPT | Performed by: INTERNAL MEDICINE

## 2023-11-09 PROCEDURE — 94760 N-INVAS EAR/PLS OXIMETRY 1: CPT

## 2023-11-09 PROCEDURE — 93005 ELECTROCARDIOGRAM TRACING: CPT

## 2023-11-09 PROCEDURE — 84484 ASSAY OF TROPONIN QUANT: CPT | Performed by: PHYSICIAN ASSISTANT

## 2023-11-09 PROCEDURE — 82948 REAGENT STRIP/BLOOD GLUCOSE: CPT

## 2023-11-09 PROCEDURE — 99232 SBSQ HOSP IP/OBS MODERATE 35: CPT | Performed by: INTERNAL MEDICINE

## 2023-11-09 PROCEDURE — 99223 1ST HOSP IP/OBS HIGH 75: CPT | Performed by: INTERNAL MEDICINE

## 2023-11-09 RX ORDER — CARVEDILOL 3.12 MG/1
6.25 TABLET ORAL 2 TIMES DAILY WITH MEALS
Status: DISCONTINUED | OUTPATIENT
Start: 2023-11-09 | End: 2023-11-11 | Stop reason: HOSPADM

## 2023-11-09 RX ORDER — HEPARIN SODIUM 10000 [USP'U]/100ML
3-20 INJECTION, SOLUTION INTRAVENOUS
Status: DISCONTINUED | OUTPATIENT
Start: 2023-11-09 | End: 2023-11-10

## 2023-11-09 RX ORDER — HEPARIN SODIUM 1000 [USP'U]/ML
2000 INJECTION, SOLUTION INTRAVENOUS; SUBCUTANEOUS EVERY 6 HOURS PRN
Status: DISCONTINUED | OUTPATIENT
Start: 2023-11-09 | End: 2023-11-10

## 2023-11-09 RX ORDER — ASPIRIN 81 MG/1
324 TABLET, CHEWABLE ORAL ONCE
Status: COMPLETED | OUTPATIENT
Start: 2023-11-09 | End: 2023-11-09

## 2023-11-09 RX ORDER — HEPARIN SODIUM 1000 [USP'U]/ML
4000 INJECTION, SOLUTION INTRAVENOUS; SUBCUTANEOUS EVERY 6 HOURS PRN
Status: DISCONTINUED | OUTPATIENT
Start: 2023-11-09 | End: 2023-11-10

## 2023-11-09 RX ADMIN — METHYLPREDNISOLONE SODIUM SUCCINATE 40 MG: 40 INJECTION, POWDER, FOR SOLUTION INTRAMUSCULAR; INTRAVENOUS at 05:22

## 2023-11-09 RX ADMIN — LEVALBUTEROL HYDROCHLORIDE 1.25 MG: 1.25 SOLUTION RESPIRATORY (INHALATION) at 07:23

## 2023-11-09 RX ADMIN — GUAIFENESIN 1200 MG: 600 TABLET ORAL at 08:40

## 2023-11-09 RX ADMIN — LEVALBUTEROL HYDROCHLORIDE 1.25 MG: 1.25 SOLUTION RESPIRATORY (INHALATION) at 12:45

## 2023-11-09 RX ADMIN — LEVALBUTEROL HYDROCHLORIDE 1.25 MG: 1.25 SOLUTION RESPIRATORY (INHALATION) at 02:10

## 2023-11-09 RX ADMIN — IPRATROPIUM BROMIDE 0.5 MG: 0.5 SOLUTION RESPIRATORY (INHALATION) at 07:23

## 2023-11-09 RX ADMIN — IPRATROPIUM BROMIDE 0.5 MG: 0.5 SOLUTION RESPIRATORY (INHALATION) at 19:53

## 2023-11-09 RX ADMIN — HEPARIN SODIUM 4000 UNITS: 1000 INJECTION, SOLUTION INTRAVENOUS; SUBCUTANEOUS at 10:49

## 2023-11-09 RX ADMIN — METHYLPREDNISOLONE SODIUM SUCCINATE 40 MG: 40 INJECTION, POWDER, FOR SOLUTION INTRAMUSCULAR; INTRAVENOUS at 13:47

## 2023-11-09 RX ADMIN — GUAIFENESIN 1200 MG: 600 TABLET ORAL at 17:09

## 2023-11-09 RX ADMIN — HEPARIN SODIUM 17.1 UNITS/KG/HR: 10000 INJECTION, SOLUTION INTRAVENOUS at 22:26

## 2023-11-09 RX ADMIN — METOPROLOL TARTRATE 50 MG: 50 TABLET ORAL at 08:40

## 2023-11-09 RX ADMIN — HEPARIN SODIUM 2000 UNITS: 1000 INJECTION, SOLUTION INTRAVENOUS; SUBCUTANEOUS at 19:29

## 2023-11-09 RX ADMIN — ASPIRIN 81 MG CHEWABLE TABLET 324 MG: 81 TABLET CHEWABLE at 00:29

## 2023-11-09 RX ADMIN — MONTELUKAST 10 MG: 10 TABLET, FILM COATED ORAL at 22:26

## 2023-11-09 RX ADMIN — CARVEDILOL 6.25 MG: 3.12 TABLET, FILM COATED ORAL at 17:09

## 2023-11-09 RX ADMIN — IPRATROPIUM BROMIDE 0.5 MG: 0.5 SOLUTION RESPIRATORY (INHALATION) at 02:10

## 2023-11-09 RX ADMIN — IPRATROPIUM BROMIDE 0.5 MG: 0.5 SOLUTION RESPIRATORY (INHALATION) at 12:45

## 2023-11-09 RX ADMIN — AZITHROMYCIN MONOHYDRATE 500 MG: 500 TABLET ORAL at 17:09

## 2023-11-09 RX ADMIN — METHYLPREDNISOLONE SODIUM SUCCINATE 40 MG: 40 INJECTION, POWDER, FOR SOLUTION INTRAMUSCULAR; INTRAVENOUS at 22:26

## 2023-11-09 RX ADMIN — HEPARIN SODIUM 11.1 UNITS/KG/HR: 10000 INJECTION, SOLUTION INTRAVENOUS at 00:51

## 2023-11-09 RX ADMIN — LEVALBUTEROL HYDROCHLORIDE 1.25 MG: 1.25 SOLUTION RESPIRATORY (INHALATION) at 19:53

## 2023-11-09 RX ADMIN — UMECLIDINIUM 1 PUFF: 62.5 AEROSOL, POWDER ORAL at 10:49

## 2023-11-09 RX ADMIN — FLUTICASONE FUROATE AND VILANTEROL TRIFENATATE 1 PUFF: 100; 25 POWDER RESPIRATORY (INHALATION) at 10:49

## 2023-11-09 NOTE — CONSULTS
Consult - Cardiology   Melody Wood 80 y.o. male MRN: [id number]  Unit/Bed#: ED 08 Encounter: [id number]        Reason For Consult:  Abnormal troponin  Outpatient Cardiologist: Dr. Raleigh Del Rosario, but not seen since 2019               ASSESSMENT:  Acute respiratory failure, multifactorial  Initially required 2 L nasal cannula but due to work of breathing was intermittently on BiPAP on the first night of his admission  Etiology possibly from COPD vs post COVID infection vs component of CHF vs ILD  Consultation from pulmonary is pending  Abnormal troponin 200 --> 3056 --> 6155 --> 6753 --> 7188  EKG showing sinus rhythm and sinus tachycardia with underlying left anterior fascicular and bifascicular block with some nonspecific ST changes in anterior leads while tachycardic, no ST elevation  Probable non-MI related troponin elevation secondary to his respiratory issues  We will check an echocardiogram  He has been started on empiric IV heparin  Acute COVID infection  Initially had COVID 1 month ago but then tested positive again in the ER  Hx of CAD  Inferior MI around 2008 at which time he received 2 stents to RCA and 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 He has been started on empiric IV heparin which can be continued while we await the results of his echocardiogram. He has also given aspirin 324. I suspect his cardiac enzymes are elevated secondary to accelerated hypertension as well as respiratory failure. This in the setting of known AS could explain his troponin spill.    Check echo  Start aspirin 81 daily  Change metoprolol to carvedilol for better BP control  Check lipid panel  Consider addition of amlodipine if needed  Ischemic eval timing/ modality TBD    History Of Present Illness:  Mr. Maggi Arriaza is a pleasant 80-year-old gentleman who had previously followed with our group for surveillance of hypertension, hyperlipidemia, and known coronary atherosclerosis. He has not been seen since 2019. He has a history of preserved biventricular function on prior echocardiograms but has not been evaluated for several years with an echo. He does follow with a PCP at [facility] primary care physicians. Presently he is in the emergency department with shortness of breath. This is chronic but worse since saturady evening. At rest he is comfortable but with exertion he gets short of breath. He has been using breathing treatment at home which help but only temporarily. He indira to his PCP hesterday who prescribed some meds but then we pt was at the pharmacy he became acutely more short of breath and 911 was called. As such he came to the ER on November 8, 2023. His oxygen saturations were in the 80s on arrival so he was placed on nasal cannula. CT of his chest was performed which was concerning for interstitial lung disease and prior asbestos exposure. Overnight on his first admission he became tachypneic and required brief BiPAP. He became tachycardic. EKG was performed which showed sinus tachycardia with transient ST depressions. Troponins were checked which increased up to 7000. As such we are asked see him in consultation. He has no chest pain or chest pressure. He has no palpitations or dizziness.     Past Medical History:        Past Medical History:   Diagnosis Date    Arthritis     Cardiac disease     heart attck 10 years ago    COPD (chronic obstructive pulmonary disease) ([address])       Past Surgical History:   Procedure Laterality Date    CARDIAC SURGERY      stent placement    EYE SURGERY      bilateral cataract surgery    VASCULAR SURGERY legs        Allergy:        Allergies   Allergen Reactions    Niacin Other (See Comments)       Medications:       Prior to Admission medications    Medication Sig Start Date End Date Taking? Authorizing Provider   ipratropium-albuterol (DUO-NEB) 0.5-2.5 mg/3 mL nebulizer solution  7/5/23  Yes Historical Provider, MD   metoprolol tartrate (LOPRESSOR) 50 mg tablet Take 50 mg by mouth daily     Yes Historical Provider, MD Navarro Butler [place] [id number] MCG/ACT inhaler 1 puff daily 6/26/23  Yes Historical Provider, MD   albuterol (ProAir HFA) 90 mcg/act inhaler 6 (six) times a day    Historical Provider, MD   ANOJOJO ELLIPTA 62.5-25 MCG/INH inhaler INHALE 1 PUFF BY MOUTH ONCE A DAY - RINSE MOUTH AFTER USE  Patient not taking: Reported on 11/8/2023 7/5/18   Historical Provider, MD   aspirin 325 mg tablet Take 325 mg by mouth daily. Patient not taking: Reported on 6/14/2023    Historical Provider, MD   betamethasone dipropionate (DIPROSONE) 0.05 % cream Apply topically daily  Patient not taking: Reported on 7/6/2023 6/14/23   Gena Landry DPM   cholestyramine (QUESTRAN) 4 g packet Take 1 packet (4 g total) by mouth daily at bedtime  Patient not taking: Reported on 6/14/2023 12/9/21   ERICA Hanson   Coenzyme Q10 (SM CoQ-10) 50 MG CAPS Take by mouth  Patient not taking: Reported on 6/14/2023    Historical Provider, MD   ezetimibe (ZETIA) 10 mg tablet Take 10 mg by mouth 3 (three) times a week.     Historical Provider, MD   Fish Oil-Krill Oil (KRILL OIL PLUS) CAPS Take by mouth  Patient not taking: Reported on 12/9/2021    Historical Provider, MD   furosemide (LASIX) 20 mg tablet 20 mg daily    Historical Provider, MD   rivaroxaban (Xarelto) 15 mg tablet every 24 hours  Patient not taking: Reported on 11/8/2023    Historical Provider, MD       Family History:     Family History   Problem Relation Age of Onset    Colon cancer Neg Hx     Colon polyps Neg Hx         Social History:       Social History Socioeconomic History    Marital status: Single     Spouse name: None    Number of children: None    Years of education: None    Highest education level: None   Occupational History    None   Tobacco Use    Smoking status: Former     Types: Cigarettes    Smokeless tobacco: Never   Vaping Use    Vaping Use: Never used   Substance and Sexual Activity    Alcohol use: Not Currently     Comment: occasional    Drug use: No    Sexual activity: Not Currently   Other Topics Concern    None   Social History Narrative    None     Social Determinants of Health     Financial Resource Strain: Not on file   Food Insecurity: Not on file   Transportation Needs: Not on file   Physical Activity: Not on file   Stress: Not on file   Social Connections: Not on file   Intimate Partner Violence: Not on file   Housing Stability: Not on file       ROS:  14 point ROS negative except as outlined above  Remainder review of systems is negative    Exam:  General:  alert, oriented and in no distress, cooperative  Head: Normocephalic, atraumatic. Eyes:  EOMI. Pupils - equal, round, reactive to accomodation. No icterus. Normal Conjunctiva. Oropharynx: moist and normal-appearing mucosa  Neck: supple, symmetrical, trachea midline and no JVD  Heart:  RRR, No: murmer, rub or gallop, S1 & S2 normal   Respiratory effort / Chest Inspection: unlabored  Lungs:  normal air entry, lungs clear to auscultation and no rales, rhonchi or wheezing   Abdomen: flat, normal findings: bowel sounds normal and soft, non-tender  Lower Limbs:  no pitting edema  Pulses[de-identified]  RLE - DP: present 2+                 LLE - DP: present 2+  Musculoskeletal: ROM grossly normal        DATA:      ECG:                     Telemetry: Normal sinus rhythm, . HR 70's          Echocardiogram:           Ischemic Testing:         Weights:     Wt Readings from Last 3 Encounters:   07/06/23 111 kg (245 lb)   06/14/23 109 kg (240 lb)   12/09/21 109 kg (240 lb 6.4 oz)   , There is no height or weight on file to calculate BMI.          Lab Studies:             Results from last 7 days   Lab Units 11/09/23  0521 11/09/23  0027 11/08/23  1538   WBC Thousand/uL 8.71 6.22 9.24   HEMOGLOBIN g/dL 13.8 13.9 13.8   HEMATOCRIT % 42.8 42.7 42.8   PLATELETS Thousands/uL 338 330 311   ,   Results from last 7 days   Lab Units 11/09/23  0521 11/08/23  1538   POTASSIUM mmol/L 4.8 4.4   CHLORIDE mmol/L 106 106   CO2 mmol/L 26 24   BUN mg/dL 19 14   CREATININE mg/dL 1.01 0.96   CALCIUM mg/dL 9.1 9.1   ALK PHOS U/L  --  83   ALT U/L  --  21   AST U/L  --  19

## 2023-11-09 NOTE — PROGRESS NOTES
[facility]  Progress Note  Name: Olivia Jain  MRN: [id number]  Unit/Bed#: ED 08 I Date of Admission: 11/8/2023   Date of Service: 11/9/2023 I Hospital Day: 1    Assessment/Plan   * Acute respiratory failure with hypoxia [facility])  Assessment & Plan  Patient reported shortness of breath in the past 5 days. He was seen by outpatient provider was started on levofloxacin and prednisone however he was supposed to get his treatment at the pharmacy today, and he had suddenly worsening shortness of breath. Was transferred via EMS to the hospital.  Per EMS oxygen saturation 88 to 89% but improved with breathing treatments. Received 125 mg Solu-Medrol prehospital.  Unclear etiology for patient's respiratory distress. Differentials include COPD exacerbation post COVID-19 infection, PE, pneumonia, ILD. Unlikely heart failure. Patient also has history of asbestosis. Chest x-ray showed extensive bilateral calcified pleural plaques, increased opacification of the right medial lung base, which may represent atelectasis or infection/inflammation, increased blunting of the right CP angle, possibly a small pleural effusion versus pleural thickening. Procalcitonin negative x2, no leukocytosis. Tested positive for COVID but he had COVID a month ago. BNP slightly elevated, patient does not appear to be fluid overload. Patient received 40 IV Lasix yesterday for flash pulmonary edema concern. CTA did not showed PE or acute pulmonary pathology, showed emphysema and low lung volume with bilateral rounded atelectasis and pleural changes consistent with asbestosis. Plan:  Continue steroids for now, inhalers, respiratory protocol   Continue azithromycin, dc ceftriaxone.     Continue [place] oxygen to maintain saturations >88%  Follow up echo    Hx of coronary artery disease  Assessment & Plan  MI in 2008, stents on RCA   F/u echo    Elevated troponin  Assessment & Plan  Lab Results   Component Value Date HSTNI0 6,753 (H) 11/09/2023    HSTNI2 7,188 (H) 11/09/2023    HSTNID2 435 (H) 11/09/2023    HSTNI4 6,086 (H) 11/09/2023    HSTNID4 -667 11/09/2023      No chest pain  EKG showed sinus tachycardia with nonspecific changes, likely tachycardia related   Possible non MI troponin elevation in the setting of tachycardia and hypertension. Was started on heparin drip for acs concern   Follow up echo results   Cardiology on board. Was started on aspirin   Continue heparin drip while waiting for echo results       DARIUS (obstructive sleep apnea)  Assessment & Plan  Continue CPAP at bedtime     SIRS (systemic inflammatory response syndrome) ([facility])  Assessment & Plan  POA with tachycardia and tachypnea likely in the setting of respiratory distress  Does not meet sepsis criteria. COVID-19 virus infection  Assessment & Plan  Patient tested positive for COVID on 11/8 however patient reported he had a positive test a month ago or so. Except shortness of breath patient has no other symptoms COVID-related. Given the fact that patient had COVID a month ago unlikely he has reinfection. Monitor. 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 He followed with hematology oncology in 2021  Currently not on any anticoagulation. CTA showed no PE     COPD (chronic obstructive pulmonary disease) ([facility])  Assessment & Plan  With pulmonology as outpatient. On Trelegy Ellipta, albuterol as needed, Singulair, nebulizer as home medication  Patient denied worsening cough. Shortness of breath in the past 5 days. We will treat as COPD exacerbation for now  Pulmonology was consulted. Essential hypertension  Assessment & Plan  Not well controlled. Cardiology switched metoprolol to carvedilol 6.25 mg twice daily for better blood pressure control.  Status post 40 iv lasix yesterday( pt lasix naive, was on lasix a few mo ago for LE edema)  Monitor blood pressure. If uncontrolled, start amlodipine 5 mg             VTE Pharmacologic Prophylaxis: VTE Score: 9 High Risk (Score >/= 5) - Pharmacological DVT Prophylaxis Ordered: heparin drip. Sequential Compression Devices Ordered. Patient Centered Rounds: I performed bedside rounds with nursing staff today. Discussions with Specialists or Other Care Team Provider: cm, pt, ot, pulm     Education and Discussions with Family / Patient: Updated  (daughter) at bedside. Total Time Spent on Date of Encounter in care of patient: 60 mins. This time was spent on one or more of the following: performing physical exam; counseling and coordination of care; obtaining or reviewing history; documenting in the medical record; reviewing/ordering tests, medications or procedures; communicating with other healthcare professionals and discussing with patient's family/caregivers. Current Length of Stay: 1 day(s)  Current Patient Status: Inpatient   Certification Statement: The patient will continue to require additional inpatient hospital stay due to acute respiratory failure   Discharge Plan: Anticipate discharge in 24-48 hrs to home. Code Status: Level 1 - Full Code    Subjective:   Feeling slightly better than yesterday. No chest pain/heaviness. Is hungry, wants to eat     Objective:     Vitals:   Temp (24hrs), Av.7 °F (37.1 °C), Min:98.7 °F (37.1 °C), Max:98.7 °F (37.1 °C)    Temp:  [98.7 °F (37.1 °C)] 98.7 °F (37.1 °C)  HR:  [] 84  Resp:  [16-61] 20  BP: (132-232)/() 176/91  SpO2:  [93 %-100 %] 95 %  Body mass index is 33.23 kg/m². Input and Output Summary (last 24 hours): Intake/Output Summary (Last 24 hours) at 2023 1241  Last data filed at 2023 1049  Gross per 24 hour   Intake 350 ml   Output 2250 ml   Net -1900 ml       Physical Exam:   Physical Exam  Vitals and nursing note reviewed.    Constitutional:       General: He is not in acute distress. Appearance: He is not diaphoretic. HENT:      Head: Normocephalic. Eyes:      General:         Right eye: No discharge. Left eye: No discharge. Cardiovascular:      Rate and Rhythm: Normal rate and regular rhythm. Heart sounds: Murmur heard. Pulmonary:      Effort: Pulmonary effort is normal. No respiratory distress. Breath sounds: Rhonchi present. No wheezing or rales. Abdominal:      General: There is no distension. Palpations: Abdomen is soft. Tenderness: There is no abdominal tenderness. There is no guarding or rebound. Musculoskeletal:      Cervical back: Normal range of motion. Right lower leg: No edema. Left lower leg: No edema. Skin:     General: Skin is warm. Neurological:      Mental Status: He is alert. Psychiatric:         Mood and Affect: Mood normal.         Behavior: Behavior normal.         Thought Content: Thought content normal.         Judgment: Judgment normal.          Additional Data:     Labs:  Results from last 7 days   Lab Units 11/09/23 0521 11/09/23 0027 11/08/23  1538   WBC Thousand/uL 8.71   < > 9.24   HEMOGLOBIN g/dL 13.8   < > 13.8   HEMATOCRIT % 42.8   < > 42.8   PLATELETS Thousands/uL 338   < > 311   NEUTROS PCT %  --   --  77*   LYMPHS PCT %  --   --  9*   MONOS PCT %  --   --  8   EOS PCT %  --   --  5    < > = values in this interval not displayed.      Results from last 7 days   Lab Units 11/09/23 0521 11/08/23  1538   SODIUM mmol/L 138 138   POTASSIUM mmol/L 4.8 4.4   CHLORIDE mmol/L 106 106   CO2 mmol/L 26 24   BUN mg/dL 19 14   CREATININE mg/dL 1.01 0.96   ANION GAP mmol/L 6 8   CALCIUM mg/dL 9.1 9.1   ALBUMIN g/dL  --  4.0   TOTAL BILIRUBIN mg/dL  --  0.62   ALK PHOS U/L  --  83   ALT U/L  --  21   AST U/L  --  19   GLUCOSE RANDOM mg/dL 133 115     Results from last 7 days   Lab Units 11/09/23  0027   INR  1.11             Results from last 7 days   Lab Units 11/09/23 0521 11/08/23  1538 LACTIC ACID mmol/L  --  1.2   PROCALCITONIN ng/ml 0.07 0.07       Lines/Drains:  Invasive Devices       Peripheral Intravenous Line  Duration             Peripheral IV 11/08/23 Left Forearm <1 day    Peripheral IV 11/08/23 Proximal;Right;Ventral (anterior) Forearm <1 day                          Imaging: Reviewed radiology reports from this admission including: chest xray and chest CT scan    Recent Cultures (last 7 days):   Results from last 7 days   Lab Units 11/08/23  1538   BLOOD CULTURE  Received in Microbiology Lab. Culture in Progress. Received in Microbiology Lab. Culture in Progress.        Last 24 Hours Medication List:   Current Facility-Administered Medications   Medication Dose Route Frequency Provider Last Rate    acetaminophen  650 mg Oral Q6H PRN Leopoldo Orlando MD      azithromycin  500 mg Oral Q24H Leopoldo Orlando MD      carvedilol  6.25 mg Oral BID With Meals [facility] Alicia, PA-C      ezetimibe  10 mg Oral Once per day on Mon Wed Fri Leopoldo Orlando MD      Fluticasone Furoate-Vilanterol  1 puff Inhalation Daily Leopoldo Orlando MD      And    umeclidinium  1 puff Inhalation Daily Leopoldo Orlando MD      guaiFENesin  1,200 mg Oral BID Leopoldo Orlando MD      heparin (porcine)  3-20 Units/kg/hr (Order-Specific) Intravenous Titrated Sherrin Mode, PA-C 15.1 Units/kg/hr (11/09/23 1049)    heparin (porcine)  2,000 Units Intravenous Q6H PRN Sherrin Mode, PA-C      heparin (porcine)  4,000 Units Intravenous Q6H PRN Sherrin Mode, PA-C      ipratropium  0.5 mg Nebulization Q6H Leopoldo Orlando MD      labetalol  10 mg Intravenous Q6H PRN Leopoldo Orlando MD      levalbuterol  1.25 mg Nebulization Q6H Leopoldo Orlando MD      methylPREDNISolone sodium succinate  40 mg Intravenous Stefano Leonard MD      montelukast  10 mg Oral HS Leopoldo Orlando MD          Today, Patient Was Seen By: Leopoldo Orlando MD    **Please Note: This note may have been constructed using a voice recognition system. **

## 2023-11-09 NOTE — ASSESSMENT & PLAN NOTE
With pulmonology as outpatient. On Trelegy Ellipta, albuterol as needed, Singulair, nebulizer as home medication  Patient denied worsening cough. Shortness of breath in the past 5 days. We will treat as COPD exacerbation for now  Pulmonology was consulted.

## 2023-11-09 NOTE — UTILIZATION REVIEW
Initial Clinical Review  Date: 11/10/23     Day 3: Has surpassed a 2nd midnight with active treatments and services, which include ongoing labs, heparin gtt titration per PTT, IV steroids, nebs, oximetry and oxygen. Cardiology and pulmonary on consult. Admission: Date/Time/Statement:   Admission Orders (From admission, onward)       Ordered        11/08/23 1627  INPATIENT ADMISSION  Once                          Orders Placed This Encounter   Procedures    INPATIENT ADMISSION     Standing Status:   Standing     Number of Occurrences:   1     Order Specific Question:   Level of Care     Answer:   Med Surg [16]     Order Specific Question:   Estimated length of stay     Answer:   More than 2 Midnights     Order Specific Question:   Certification     Answer:   I certify that inpatient services are medically necessary for this patient for a duration of greater than two midnights. See H&P and MD Progress Notes for additional information about the patient's course of treatment. ED Arrival Information       Expected   -    Arrival   11/8/2023 15:13    Acuity   Emergent              Means of arrival   Ambulance    Escorted by   [address] [place], [place] [address]ist    Admission type   Emergency              Arrival complaint   resp distress             Chief Complaint   Patient presents with    Shortness of Breath     Cough since Saturday. Seen by PCP today and prescribed prednisone and abx. Went to pharmacy to p/u prescriptions and became very SOB. 89% on RA upon EMS arrival. Audible wheezxing upon arrival. Duoneb and 125mg solumedrol given en route. Initial Presentation: 80 y.o. male from home to ED via ems admitted inpatient due to acute respiratory failure with hypoxia/SIRS/COVID 19. Tested + COVID about 1 month ago and 11/8. Presented due to respiratory distress on day of arrival, starting about 3 to 4 days prior to arrival with dyspnea and cough. Intermittent diaphoresis.   Saw PCP then went to pharmacy on day of arrival and acutely short of breath. Per ems sat 89%, given oxygen, Duoneb and Solu medrol. On exam: increased work of breathing. Tachypnea. Lungs wheezing and decreased breath sounds. CRP 7.9. D dimer 1.31. . SARS-CoV-2 positive. CxR showed increased opacification of right medial lungs. Extensive bilateral pleural plaques. In the ED placed on oxygen. Given [employer], started on ceftriaxone, azithromycin. Plan is continue antibiotics, check Procalcitonin x 2 and if negative then dc ceftriaxone. Dose of Lasix IV. Check cta chest.   Oxygen to maintain sat of > 88%. Echo. Consult Pulmonary     Date: 11/9/23    Day 2: overnight episode of sudden tachycardia and work of breathing, hypertensive. Placed on Bipap. Given additional Lasix,  Duoneb and Mg. Troponin rising and given asa, started on Heparin gtt and cardiology consulted. Today, weaned to nasal cannula. Hungry. Net -1900. On exam cardiac murmur. Rhonchi. Plan is continue steroids, azithromycin. Oxygen to maintain sat > 88%. Echo. Heparin gtt while await echo. 11/9/23 per Cardiology - patient with acute respiratory failure and etiology could be COPD vs post COVID infection vs component of CHF vs ILD. troponin 200 --> 3056 --> 6155 --> 6753 --> 7188  and suspect non MI related due to respiratory issues. Check echo and on Heparin gtt. Start asa. Change metoprolol to carvedilol. Check lipids. If remains hpt may need amlodipine. 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 Doubt active COVID infection. Plan is scheduled nebs, continue methylpred, azithromycin. CPAP at HS. No intervention for chronic pleural plaques.      ED Triage Vitals   Temperature Pulse Respirations Blood Pressure SpO2   11/08/23 1518 11/08/23 [phone] 11/08/23 1516 11/08/23 1518 11/08/23 1516   98.7 °F (37.1 °C) (!) 117 (!) 38 (!) 232/107 97 %      Temp Source Heart Rate Source Patient Position - Orthostatic VS BP Location FiO2 (%)   11/08/23 1518 11/08/23 1516 11/08/23 1516 11/08/23 1516 11/08/23 2222   Axillary Monitor Sitting Left arm 80      Pain Score       11/08/23 1516       No Pain          Wt Readings from Last 1 Encounters:   11/09/23 111 kg (245 lb)     Additional Vital Signs:   11/09/23 0905 -- 84 -- 176/91 Abnormal  -- -- -- -- -- -- -- -- --   11/09/23 0725 -- -- -- -- -- -- -- 44 -- 6 L/min Nasal cannula -- --   11/09/23 0700 -- 76 20 176/91 Abnormal  126 95 % -- 44 -- 6 L/min Nasal cannula -- --   11/09/23 0600 -- 73 20 165/77 114 95 % -- 44 -- 6 L/min Nasal cannula -- --   11/09/23 0500 -- 79 21 153/83 112 94 % -- 44 -- 6 L/min Nasal cannula -- --   11/09/23 0400 -- 77 20 152/80 110 94 % -- 44 -- 6 L/min Nasal cannula -- --   11/09/23 0300 -- 75 18 164/84 117 99 % -- 44 -- 6 L/min Nasal cannula -- --   11/09/23 0215 -- 78 18 142/85 107 97 % -- 44 -- 6 L/min Nasal cannula -- --   11/09/23 0100 -- 66 16 -- -- 100 % -- -- -- -- -- -- --   11/09/23 0000 -- 67 22 160/79 113 98 % -- -- -- -- BiPAP -- --   11/08/23 2300 -- 76 22 132/68 94 99 % -- -- -- -- BiPAP -- --   11/08/23 2210 -- 117 Abnormal  26 Abnormal  189/90 Abnormal  131 98 % -- -- -- -- -- -- --   11/08/23 2205 -- 124 Abnormal  61 Abnormal  224/107 Abnormal  153 97 % -- -- -- -- -- -- --   11/08/23 2130 -- 101 20 173/90 Abnormal   -- 95 % -- -- 6 L/min -- Nasal cannula -- Sitting   BP: scheduled lopressor given.  at 11/08/23 2130 11/08/23 2100 -- -- -- -- -- 93 % -- -- 6 L/min -- Nasal cannula -- --   11/08/23 1749 -- 108 Abnormal  26 Abnormal  -- -- 95 % -- -- -- -- -- -- --   11/08/23 1744 -- 112 Abnormal  30 Abnormal  -- -- 96 % -- -- 2 L/min -- Nasal cannula       Pertinent Labs/Diagnostic Test Results:   XR chest 1 view portable   Final Result by Leotha Prader, MD (11/09 3462)      Findings consistent with asbestos exposure. Limited study unchanged. Workstation performed: [id number]         CTA chest pe study   Final Result by Skyler Johnson MD (11/08 1954)      No PE or acute pulmonary pathology identified. Low lung volumes with bilateral rounded atelectasis and findings of prior asbestos exposure. .                  Workstation performed: [id number]         XR chest 1 view portable   Final Result by Al Lennox, MD (11/08 1707)      1. Extensive bilateral calcified pleural plaques. 2. Increased opacification of the right medial lung base, which may represent atelectasis or infection/inflammation. 3. Increased blunting of the right CP angle, possibly a small pleural effusion versus pleural thickening.       If clinically warranted, this could be further assessed with CT of the chest.            Workstation performed: [id number]           11/8/23 ecg - Sinus tachycardia  Right bundle branch block  Left anterior fascicular block   Bifascicular block   Moderate voltage criteria for LVH, may be normal variant  Abnormal ECG  When compared with ECG of 27-AUG-2018 17:04,  No significant change was found    11/8/23 ecg Sinus tachycardia with Premature supraventricular complexes  Right bundle branch block  Left anterior fascicular block   Bifascicular block   Abnormal ECG  When compared with ECG of 08-NOV-2023 15:43, (unconfirmed)  Premature supraventricular complexes are now Present  ST now depressed in Lateral leads    11/8/23 ecg Sinus tachycardia  Right bundle branch block  Left anterior fascicular block   Bifascicular block   Moderate voltage criteria for LVH, may be normal variant  Abnormal ECG  When compared with ECG of 08-NOV-2023 22:00, (unconfirmed)  No significant change was found    11/9/23 ecg Normal sinus rhythm  Left axis deviation  Right bundle branch block  Moderate voltage criteria for LVH, may be normal variant  Abnormal ECG  When compared with ECG of 08-NOV-2023 22:21, (unconfirmed)  No significant change was found    11/9/23 ecg Normal sinus rhythm  Left axis deviation  Right bundle branch block  Moderate voltage criteria for LVH, may be normal variant  Abnormal ECG  When compared with ECG of 09-NOV-2023 01:12, (unconfirmed)  No significant change was found    11/9/23 echo  Left Ventricle: Left ventricular cavity size is normal. Wall thickness is moderately increased. There is moderate concentric hypertrophy. The left ventricular ejection fraction is 60%. Systolic function is normal. Diastolic function is mildly abnormal, consistent with grade I (abnormal) relaxation. The following segments are akinetic: basal inferior. The following segments are hypokinetic: basal inferolateral.    All other segments are normal.    Aortic Valve: The aortic valve is trileaflet. The leaflets are severely calcified. There is moderately reduced mobility. There is moderate to severe stenosis. The aortic valve mean gradient is 22.0 mmHg. The dimensionless velocity index is 0.28. The aortic valve area is 0.99 cm2. Mitral Valve: There is mild annular calcification. There is trace regurgitation. Tricuspid Valve:  There is mild regurgitation    Results from last 7 days   Lab Units 11/08/23  1519   SARS-COV-2  Positive*     Results from last 7 days   Lab Units 11/10/23  0142 11/09/23 0521 11/09/23  0027 11/08/23  1538   WBC Thousand/uL 16.48* 8.71 6.22 9.24   HEMOGLOBIN g/dL 13.0 13.8 13.9 13.8   HEMATOCRIT % 41.6 42.8 42.7 42.8   PLATELETS Thousands/uL 319 338 330 311   NEUTROS ABS Thousands/µL  --   --   --  7.14     Results from last 7 days   Lab Units 11/10/23  0142 11/09/23  0521 11/08/23  2228 11/08/23  1538   SODIUM mmol/L 134* 138  --  138   POTASSIUM mmol/L 4.9 4.8  --  4.4   CHLORIDE mmol/L 107 106  --  106   CO2 mmol/L 19* 26  --  24   ANION GAP mmol/L 8 6  --  8   BUN mg/dL 39* 19  --  14   CREATININE mg/dL 1.28 1.01  --  0.96   EGFR ml/min/1.73sq m 51 68  --  72   CALCIUM mg/dL 8.7 9.1  --  9.1   MAGNESIUM mg/dL  --   --  2.0  --      Results from last 7 days   Lab Units 11/08/23  1538   AST U/L 19   ALT U/L 21   ALK PHOS U/L 83   TOTAL PROTEIN g/dL 7.4   ALBUMIN g/dL 4.0   TOTAL BILIRUBIN mg/dL 0.62     Results from last 7 days   Lab Units 11/10/23  0142 11/09/23  0521 11/08/23  1538   GLUCOSE RANDOM mg/dL 143* 133 115     Results from last 7 days   Lab Units 11/09/23  0839 11/09/23  0521 11/09/23  0312 11/09/23  0113 11/08/23  2315 11/08/23  2108   HS TNI 0HR ng/L  --   --  0,387*  --   --  200*   HS TNI 2HR ng/L  --  5,583*  --   --  3,056*  --    HSTNI D2 ng/L  --  435*  --   --  2,856*  --    HS TNI 4HR ng/L 6,086*  --   --  6,155*  --   --    HSTNI D4 ng/L -667  --   --  5,955*  --   --      Results from last 7 days   Lab Units 11/08/23  1538   D-DIMER QUANTITATIVE ug/ml FEU 1.31*     Results from last 7 days   Lab Units 11/10/23  0142 11/09/23  1720 11/09/23  0650 11/09/23  0027 11/08/23  1538   PROTIME seconds  --   --   --  14.7* 14.8*   INR   --   --   --  1.11 1.12   PTT seconds 68* 56* 42* 23 29     Results from last 7 days   Lab Units 11/09/23  0521 11/08/23  1538   PROCALCITONIN ng/ml 0.07 0.07     Results from last 7 days   Lab Units 11/08/23  1538   LACTIC ACID mmol/L 1.2     Results from last 7 days   Lab Units 11/08/23  1538   BNP pg/mL 162*     Results from last 7 days   Lab Units 11/08/23  1538   CRP mg/L 7.9*     Results from last 7 days   Lab Units 11/08/23  2124   CLARITY UA  Clear   COLOR UA  Yellow   SPEC GRAV UA  1.015   PH UA  6.0   GLUCOSE UA mg/dl Negative   KETONES UA mg/dl Negative   BLOOD UA  Trace*   PROTEIN UA mg/dl 100 (2+)*   NITRITE UA  Negative   BILIRUBIN UA  Negative   UROBILINOGEN UA (BE) mg/dl <2.0   LEUKOCYTES UA  Negative   WBC UA /hpf 0-1   RBC UA /hpf None Seen   BACTERIA UA /hpf Occasional   EPITHELIAL CELLS WET PREP /hpf Occasional     Results from last 7 days   Lab Units 11/08/23  1515 INFLUENZA A PCR  Negative   INFLUENZA B PCR  Negative   RSV PCR  Negative     Results from last 7 days   Lab Units 11/08/23  1538   BLOOD CULTURE  No Growth at 24 hrs. No Growth at 24 hrs.      ED Treatment:   Medication Administration from 11/08/2023 1513 to 11/09/2023 1218         Date/Time Order Dose Route Action Comments     11/08/2023 1523 EST albuterol inhalation solution 10 mg 10 mg Nebulization Given --     11/08/2023 1523 EST ipratropium (ATROVENT) 0.02 % inhalation solution 1 mg 1 mg Nebulization Given --     11/08/2023 1522 EST sodium chloride 0.9 % inhalation solution 12 mL 12 mL Nebulization Given --     11/08/2023 1525 EST ipratropium-albuterol (FOR EMS ONLY) (DUO-NEB) 0.5-2.5 mg/3 mL inhalation solution 3 mL 0 mL Does not apply Given to EMS --     11/08/2023 1525 EST methylPREDNISolone sodium succinate (FOR EMS ONLY) (Solu-MEDROL) 125 MG injection 125 mg 0 mg Does not apply Given to EMS --     11/08/2023 1604 EST cefTRIAXone (ROCEPHIN) IVPB (premix in dextrose) 1,000 mg 50 mL 1,000 mg Intravenous New Bag --     11/08/2023 1631 EST azithromycin (ZITHROMAX) 500 mg in sodium chloride 0.9% 250mL IVPB 500 mg 500 mg Intravenous New Bag --     11/08/2023 2109 EST ezetimibe (ZETIA) tablet 10 mg 10 mg Oral Given --     11/09/2023 1049 EST Fluticasone Furoate-Vilanterol 100-25 mcg/actuation 1 puff 1 puff Inhalation Given --     11/09/2023 1049 EST umeclidinium 62.5 mcg/actuation inhaler AEPB 1 puff 1 puff Inhalation Given --     11/09/2023 0840 EST guaiFENesin (MUCINEX) 12 hr tablet 1,200 mg 1,200 mg Oral Given --     11/08/2023 2109 EST guaiFENesin (MUCINEX) 12 hr tablet 1,200 mg 1,200 mg Oral Given --     11/08/2023 2109 EST heparin (porcine) subcutaneous injection 5,000 Units 5,000 Units Subcutaneous Given --     11/09/2023 0522 EST methylPREDNISolone sodium succinate (Solu-MEDROL) injection 40 mg 40 mg Intravenous Given --     11/09/2023 0840 EST metoprolol tartrate (LOPRESSOR) tablet 50 mg 50 mg Oral Given --     11/08/2023 2110 EST metoprolol tartrate (LOPRESSOR) tablet 50 mg 50 mg Oral Given --     11/08/2023 2110 EST montelukast (SINGULAIR) tablet 10 mg 10 mg Oral Given --     11/08/2023 2109 EST furosemide (LASIX) injection 20 mg 20 mg Intravenous Given --     11/09/2023 0723 EST ipratropium (ATROVENT) 0.02 % inhalation solution 0.5 mg 0.5 mg Nebulization Given --     11/09/2023 0210 EST ipratropium (ATROVENT) 0.02 % inhalation solution 0.5 mg 0.5 mg Nebulization Given --     11/08/2023 2213 EST ipratropium (ATROVENT) 0.02 % inhalation solution 0.5 mg 0.5 mg Nebulization Given --     11/09/2023 0723 EST levalbuterol (XOPENEX) inhalation solution 1.25 mg 1.25 mg Nebulization Given --     11/09/2023 0210 EST levalbuterol (XOPENEX) inhalation solution 1.25 mg 1.25 mg Nebulization Given --     11/08/2023 2213 EST levalbuterol (XOPENEX) inhalation solution 1.25 mg 1.25 mg Nebulization Given --     11/08/2023 2218 EST magnesium sulfate IVPB (premix) SOLN 1 g 1 g Intravenous New Bag --     11/08/2023 2216 EST furosemide (LASIX) injection 20 mg 20 mg Intravenous Given --     11/09/2023 0029 EST aspirin chewable tablet 324 mg 324 mg Oral Given --     11/09/2023 1049 EST heparin (porcine) 25,000 units in 0.45% NaCl 250 mL infusion (premix) 15.1 Units/kg/hr Intravenous Rate/Dose Change --     11/09/2023 0051 EST heparin (porcine) 25,000 units in 0.45% NaCl 250 mL infusion (premix) 11.1 Units/kg/hr Intravenous New Bag --     11/09/2023 1049 EST heparin (porcine) injection 4,000 Units 4,000 Units Intravenous Given --          Past Medical History:   Diagnosis Date    Arthritis     Cardiac disease     heart attck 10 years ago    COPD (chronic obstructive pulmonary disease) ([address])      Present on Admission:   Essential hypertension   COPD (chronic obstructive pulmonary disease) ([address])      Admitting Diagnosis: Respiratory distress [R06.03]  COPD exacerbation ([address]) [J44.1]  Acute respiratory failure with hypoxia ([address]) [J96.01]  COVID-19 [U07.1]  Age/Sex: 80 y.o. male  Admission Orders:11/08/23 1627  inpatient   Scheduled Medications:  azithromycin, 500 mg, Oral, Q24H  carvedilol, 6.25 mg, Oral, BID With Meals  ezetimibe, 10 mg, Oral, Once per day on Mon Wed Fri  Fluticasone Furoate-Vilanterol, 1 puff, Inhalation, Daily   And  umeclidinium, 1 puff, Inhalation, Daily  guaiFENesin, 1,200 mg, Oral, BID  ipratropium, 0.5 mg, Nebulization, Q6H  levalbuterol, 1.25 mg, Nebulization, Q6H  methylPREDNISolone sodium succinate, 40 mg, Intravenous, Q8H  montelukast, 10 mg, Oral, HS    Continuous IV Infusions:  heparin (porcine), 3-20 Units/kg/hr (Order-Specific), Intravenous, Titrated      PRN Meds:  acetaminophen, 650 mg, Oral, Q6H PRN  heparin (porcine), 2,000 Units, Intravenous, Q6H PRN  heparin (porcine), 4,000 Units, Intravenous, Q6H PRN x 1 11/9/23   labetalol, 10 mg, Intravenous, Q6H PRN    Incentive spirometry  Oxygen to keep sat > 88%. Cpap at bedtime. IP CONSULT TO PULMONOLOGY  IP CONSULT TO CARDIOLOGY    Network Utilization Review Department  ATTENTION: Please call with any questions or concerns to [phone] and carefully listen to the prompts so that you are directed to the right person. All voicemails are confidential.   For Discharge needs, contact Care Management DC Support Team at [phone] opt. 2  Send all requests for admission clinical reviews, approved or denied determinations and any other requests to dedicated fax number below belonging to the campus where the patient is receiving treatment.  List of dedicated fax numbers for the Facilities:  [place] DENIALS (Administrative/Medical Necessity) [phone]   DISCHARGE SUPPORT TEAM (NETWORK) [id number] (Maternity/NICU/Pediatrics) [phone]   [address] [phone]   [address] [phone]   [facility] [address] [id number] [facility] [address] [place] [address] [phone]

## 2023-11-09 NOTE — PROGRESS NOTES
Pt's HR acutely in the 150's. No c/o CP. Charge RN and ER physician at bedside. RN made Suzzanna Bibles with SLIM aware. Neb treatment given, and BIPAP applied by RT. HR quickly recovered to baseline. Pt made Step down. Report given to Hermelinda Ag RN.

## 2023-11-09 NOTE — ASSESSMENT & PLAN NOTE
Lab Results   Component Value Date    HSTNI0 6,753 (H) 11/09/2023    HSTNI2 7,188 (H) 11/09/2023    HSTNID2 435 (H) 11/09/2023    HSTNI4 6,086 (H) 11/09/2023    HSTNID4 -667 11/09/2023      No chest pain  EKG showed sinus tachycardia with nonspecific changes, likely tachycardia related   Possible non MI troponin elevation in the setting of tachycardia and hypertension. Was started on heparin drip for acs concern   Follow up echo results   Cardiology on board.  Was started on aspirin   Continue heparin drip while waiting for echo results

## 2023-11-09 NOTE — ASSESSMENT & PLAN NOTE
Patient tested positive for COVID on 11/8 however patient reported he had a positive test a month ago or so. Except shortness of breath patient has no other symptoms COVID-related. Given the fact that patient had COVID a month ago unlikely he has reinfection. Monitor.  No treatment

## 2023-11-09 NOTE — ASSESSMENT & PLAN NOTE
Not well controlled. Cardiology switched metoprolol to carvedilol 6.25 mg twice daily for better blood pressure control. Status post 40 iv lasix yesterday( pt lasix naive, was on lasix a few mo ago for LE edema)  Monitor blood pressure.   If uncontrolled, start amlodipine 5 mg

## 2023-11-09 NOTE — ASSESSMENT & PLAN NOTE
Patient reported shortness of breath in the past 5 days. He was seen by outpatient provider was started on levofloxacin and prednisone however he was supposed to get his treatment at the pharmacy today, and he had suddenly worsening shortness of breath. Was transferred via EMS to the hospital.  Per EMS oxygen saturation 88 to 89% but improved with breathing treatments. Received 125 mg Solu-Medrol prehospital.  Unclear etiology for patient's respiratory distress. Differentials include COPD exacerbation post COVID-19 infection, PE, pneumonia, ILD. Unlikely heart failure. Patient also has history of asbestosis. Chest x-ray showed extensive bilateral calcified pleural plaques, increased opacification of the right medial lung base, which may represent atelectasis or infection/inflammation, increased blunting of the right CP angle, possibly a small pleural effusion versus pleural thickening. Procalcitonin negative x2, no leukocytosis. Tested positive for COVID but he had COVID a month ago. BNP slightly elevated, patient does not appear to be fluid overload. Patient received 40 IV Lasix yesterday for flash pulmonary edema concern. CTA did not showed PE or acute pulmonary pathology, showed emphysema and low lung volume with bilateral rounded atelectasis and pleural changes consistent with asbestosis. Plan:  Continue steroids for now, inhalers, respiratory protocol   Continue azithromycin, dc ceftriaxone.     Continue NC oxygen to maintain saturations >88%  Follow up echo

## 2023-11-09 NOTE — QUICK NOTE
Called to bedside due to sudden onset tachycardia and work of breathing. Pt markedly hypertensive on arrival to the ED. No chest pain. Already had been placed on BiPAP by RT with clinical improvement. Give additional 20mg lasix now as pt only received 20mg x1 at 2100. Also give xopenex and atrovent that was scheduled for 2000. Give Mg IV x1. Trend urine output. Suspect component of flash pulmonary edema. Labetalol is ordered PRN, however will monitor BP s/p adjustment in BiPAP and allow time for diuresis to determine need for anti-HTN. EKG with new ST depression in lateral leads. EKG improved to baseline s/p application of BiPAP with resultant rate control and HR to the 90s. Plan to keep on BIPAP x2 hours and then evaluate ability to wean off.       0015: 2 hour trop now 3056 (0 hour was 200). ASA 324mg PO x1 ordered and placed on heparin gtt. Cardiology consulted. Echo already ordered. NPO for now until seen by cardiology. 0200: 4 hour trop 6000. Discussed with on-call cardiology, Dr. Candace Pinon, who had no further recommendations currently as pt remains without angina. Pt also able to be weaned back to NC at this time.

## 2023-11-09 NOTE — CONSULTS
Consult Note - Pulmonary   Rae Perea 80 y.o. 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 Stephanie Morrison who prescribes albuterol inhaler/nebulized  - I don't think he has active COVID infection based on lack of CT evidence of COVID pneumonia duration of time since +PCR; positive PCR likely represents residual viral particles  - will hold inhalers while in exacerbation and receiving QID VIRAL/JAMES to avoid duplicate therapy  - continue Xopenex/atrovent QID  - continue with methylpred 40mg q8hr  - agree with azithromycin x 3 days  - continue CPAP overnight -I cannot find his home settings  - No intervention needed for chronic pleural plaques. He has significant asbestos exposure history  - Management of elevated troponin and further diuresis per primary team and cardiology    History of Present Illness   HPI:  Rae Perea is a 80 y.o. male with a history of asbestosis, COPD, hypertension, hyperlipidemia who presents with shortness of breath over the 4 to 5 days. He received a COVID and flu vaccine. He did test positive for COVID 1 month ago. He did not have to go to the hospital for COVID-19. He recovered from some mild respiratory symptoms. Over the past 4 to 5 days he started developing worsening dyspnea, cough, wheezing. This was refractory to home nebulized treatments. He was seen by his PCP who ordered for him steroids and levofloxacin but before he could pick it up he started having worsening dyspnea at the pharmacy and was brought to the ED.     He then came to the ED for evaluation where he was hypoxic to 89% on room air. He was giving DuoNebs and 125 mg Solu-Medrol for audible wheezing. He had an episode of tachycardia overnight with respiratory distress and required BiPAP with clinical improvement. Patient was also given 20 mg of IV Lasix as well as magnesium. On my evaluation he feels significantly better since coming in. He still has wheezing and dyspnea with exertion. He still has coughing which is mostly nonproductive. No hemoptysis. He has a significant troponin elevation and is being seen by cardiology    Review of systems:  12 point review of systems was completed and was otherwise negative except as listed in HPI. 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 Social History: smoked for a few years in his life, cigars. Quit cigars 30-40 years. [facility]       Historical Information   Past Medical History:   Diagnosis Date    Arthritis     Cardiac disease     heart attck 10 years ago    COPD (chronic obstructive pulmonary disease) (HCC)      Past Surgical History:   Procedure Laterality Date    CARDIAC SURGERY      stent placement    EYE SURGERY      bilateral cataract surgery    VASCULAR SURGERY      legs     Family History   Problem Relation Age of Onset    Colon cancer Neg Hx     Colon polyps Neg Hx      Social History     Socioeconomic History    Marital status: Single     Spouse name: Not on file    Number of children: Not on file    Years of education: Not on file    Highest education level: Not on file   Occupational History    Not on file   Tobacco Use    Smoking status: Former     Types: Cigarettes    Smokeless tobacco: Never   Vaping Use    Vaping Use: Never used   Substance and Sexual Activity    Alcohol use: Not Currently     Comment: occasional    Drug use: No    Sexual activity: Not Currently   Other Topics Concern    Not on file   Social History Narrative    Not on file     Social Determinants of Health     Financial Resource Strain: Not on file   Food Insecurity: Not on file   Transportation Needs: Not on file   Physical Activity: Not on file   Stress: Not on file   Social Connections: Not on file   Intimate Partner Violence: Not on file   Housing Stability: Not on file       Meds/Allergies   Current Facility-Administered Medications   Medication Dose Route Frequency    acetaminophen (TYLENOL) tablet 650 mg  650 mg Oral Q6H PRN    azithromycin (ZITHROMAX) tablet 500 mg  500 mg Oral Q24H    carvedilol (COREG) tablet 6.25 mg  6.25 mg Oral BID With Meals    ezetimibe (ZETIA) tablet 10 mg  10 mg Oral Once per day on Mon Wed Fri    Fluticasone Furoate-Vilanterol 100-25 mcg/actuation 1 puff  1 puff Inhalation Daily    And    umeclidinium 62.5 mcg/actuation inhaler AEPB 1 puff  1 puff Inhalation Daily    guaiFENesin (MUCINEX) 12 hr tablet 1,200 mg  1,200 mg Oral BID    heparin (porcine) 25,000 units in 0.45% NaCl 250 mL infusion (premix)  3-20 Units/kg/hr (Order-Specific) Intravenous Titrated    heparin (porcine) injection 2,000 Units  2,000 Units Intravenous Q6H PRN    heparin (porcine) injection 4,000 Units  4,000 Units Intravenous Q6H PRN    ipratropium (ATROVENT) 0.02 % inhalation solution 0.5 mg  0.5 mg Nebulization Q6H    labetalol (NORMODYNE) injection 10 mg  10 mg Intravenous Q6H PRN    levalbuterol (XOPENEX) inhalation solution 1.25 mg  1.25 mg Nebulization Q6H    methylPREDNISolone sodium succinate (Solu-MEDROL) injection 40 mg  40 mg Intravenous Q8H    montelukast (SINGULAIR) tablet 10 mg  10 mg Oral HS     (Not in a hospital admission)    Allergies   Allergen Reactions    Niacin Other (See Comments)       Vitals: Vitals personally reviewed  Vitals:    11/09/23 0500 11/09/23 0600 11/09/23 0700 11/09/23 0905   BP: 153/83 165/77 (!) 176/91 (!) 176/91   Pulse: 79 73 76 84   Resp: 21 20 20    Temp:       TempSrc:       SpO2: 94% 95% 95%    Weight:    111 kg (245 lb)   Height:    6' (1.829 m) Body mass index is 33.23 kg/m². Intake/Output Summary (Last 24 hours) at 11/9/2023 1321  Last data filed at 11/9/2023 1049  Gross per 24 hour   Intake 350 ml   Output 2250 ml   Net -1900 ml     Invasive Devices       Peripheral Intravenous Line  Duration             Peripheral IV 11/08/23 Left Forearm <1 day    Peripheral IV 11/08/23 Proximal;Right;Ventral (anterior) Forearm <1 day                    Physical Exam  General: Elderly male, obese, sitting in bed in no acute distress, coughing   HEET: head is normocephalic, atraumatic. EOMI. No scleral icterus. Neck: Supple, no appreciable JVD, not using accessory muscles  CV:  Regular rhythm, +S1 S2  Lungs: diffuse inspiratory and expiratory wheezing  Abdomen: Soft, non distended  Extremities: No cyanosis. Edema  Neuro: No focal deficits  Skin: Warm, dry  Lines/tubes:  3LPM O2    Labs: I have personally reviewed pertinent lab results.   Laboratory and Diagnostics  Results from last 7 days   Lab Units 11/09/23  0521 11/09/23  0027 11/08/23  1538   WBC Thousand/uL 8.71 6.22 9.24   HEMOGLOBIN g/dL 13.8 13.9 13.8   HEMATOCRIT % 42.8 42.7 42.8   PLATELETS Thousands/uL 338 330 311   NEUTROS PCT %  --   --  77*   MONOS PCT %  --   --  8   EOS PCT %  --   --  5     Results from last 7 days   Lab Units 11/09/23  0521 11/08/23  1538   SODIUM mmol/L 138 138   POTASSIUM mmol/L 4.8 4.4   CHLORIDE mmol/L 106 106   CO2 mmol/L 26 24   ANION GAP mmol/L 6 8   BUN mg/dL 19 14   CREATININE mg/dL 1.01 0.96   CALCIUM mg/dL 9.1 9.1   GLUCOSE RANDOM mg/dL 133 115   ALT U/L  --  21   AST U/L  --  19   ALK PHOS U/L  --  83   ALBUMIN g/dL  --  4.0   TOTAL BILIRUBIN mg/dL  --  0.62     Results from last 7 days   Lab Units 11/08/23  2228   MAGNESIUM mg/dL 2.0      Results from last 7 days   Lab Units 11/09/23  0650 11/09/23  0027 11/08/23  1538   INR   --  1.11 1.12   PTT seconds 42* 23 29          Results from last 7 days   Lab Units 11/08/23  1538   LACTIC ACID mmol/L 1.2 Results from last 7 days   Lab Units 11/08/23  1538   CRP mg/L 7.9*             Results from last 7 days   Lab Units 11/08/23  1538   D-DIMER QUANTITATIVE ug/ml FEU 1.31*     Results from last 7 days   Lab Units 11/09/23  0521 11/08/23  1538   PROCALCITONIN ng/ml 0.07 0.07       Imaging and other studies: I have personally reviewed pertinent films in PACS  11/8/23 CTA Chest  PULMONARY ARTERIAL TREE: Suboptimal contrast bolus timing for evaluation of the peripheral PE. No central PE.  LUNGS:  Low lung volumes. Mild to moderate emphysema. Bilateral posterior, juxtapleural rounded opacities with radiating bronchi and blood vessels, volume loss, involvement of both the upper and lower lobes and adjacent to pleural thickening/plaques, most compatible with rounded atelectasis. Patent central airways. PLEURA: Bilateral, chronic calcified pleural plaques. HEART/GREAT VESSELS: Atherosclerosis. No acute pathology. MEDIASTINUM AND DEE DEE:  No enlarged lymph nodes. CHEST WALL AND LOWER NECK:   Within normal limits. VISUALIZED STRUCTURES IN THE UPPER ABDOMEN:  Within normal limits. OSSEOUS STRUCTURES: Degenerative changes and chronic, mild midthoracic loss of vertebral body height. No acute pathology. Echocardiogram:   11/9/23    Left Ventricle: Left ventricular cavity size is normal. Wall thickness is moderately increased. There is moderate concentric hypertrophy. The left ventricular ejection fraction is 60%. Systolic function is normal. Diastolic function is mildly abnormal, consistent with grade I (abnormal) relaxation. The following segments are akinetic: basal inferior. The following segments are hypokinetic: basal inferolateral.    All other segments are normal.    Aortic Valve: The aortic valve is trileaflet. The leaflets are severely calcified. There is moderately reduced mobility. There is moderate to severe stenosis. The aortic valve mean gradient is 22.0 mmHg.  The dimensionless velocity index is 0.28. The aortic valve area is 0.99 cm2. Mitral Valve: There is mild annular calcification. There is trace regurgitation. Tricuspid Valve: There is mild regurgitation. Code Status: Level 1 - Full Code      Heather Burdick MD  Pulmonary, Critical Care and Sleep Medicine  [facility]     Portions of the record may have been created with voice recognition software. Occasional wrong word or "sound a like" substitutions may have occurred due to the inherent limitations of voice recognition software. Please read the chart carefully and recognize, using context, where substitutions have occurred.

## 2023-11-09 NOTE — ASSESSMENT & PLAN NOTE
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 He followed with hematology oncology in 2021  Currently not on any anticoagulation.   CTA showed no PE

## 2023-11-10 PROBLEM — R65.10 SIRS (SYSTEMIC INFLAMMATORY RESPONSE SYNDROME) (HCC): Status: RESOLVED | Noted: 2023-11-08 | Resolved: 2023-11-10

## 2023-11-10 LAB
ANION GAP SERPL CALCULATED.3IONS-SCNC: 8 MMOL/L
APTT PPP: 68 SECONDS (ref 23–37)
APTT PPP: 75 SECONDS (ref 23–37)
ATRIAL RATE: 95 BPM
ATRIAL RATE: 97 BPM
BUN SERPL-MCNC: 39 MG/DL (ref 5–25)
CALCIUM SERPL-MCNC: 8.7 MG/DL (ref 8.4–10.2)
CHLORIDE SERPL-SCNC: 107 MMOL/L (ref 96–108)
CHOLEST SERPL-MCNC: 222 MG/DL
CO2 SERPL-SCNC: 19 MMOL/L (ref 21–32)
CREAT SERPL-MCNC: 1.28 MG/DL (ref 0.6–1.3)
ERYTHROCYTE [DISTWIDTH] IN BLOOD BY AUTOMATED COUNT: 13.2 % (ref 11.6–15.1)
GFR SERPL CREATININE-BSD FRML MDRD: 51 ML/MIN/1.73SQ M
GLUCOSE SERPL-MCNC: 143 MG/DL (ref 65–140)
HCT VFR BLD AUTO: 41.6 % (ref 36.5–49.3)
HDLC SERPL-MCNC: 49 MG/DL
HGB BLD-MCNC: 13 G/DL (ref 12–17)
LDLC SERPL CALC-MCNC: 159 MG/DL (ref 0–100)
MCH RBC QN AUTO: 29.5 PG (ref 26.8–34.3)
MCHC RBC AUTO-ENTMCNC: 31.3 G/DL (ref 31.4–37.4)
MCV RBC AUTO: 94 FL (ref 82–98)
P AXIS: 52 DEGREES
P AXIS: 57 DEGREES
PLATELET # BLD AUTO: 319 THOUSANDS/UL (ref 149–390)
PMV BLD AUTO: 9 FL (ref 8.9–12.7)
POTASSIUM SERPL-SCNC: 4.9 MMOL/L (ref 3.5–5.3)
PR INTERVAL: 164 MS
PR INTERVAL: 166 MS
QRS AXIS: -42 DEGREES
QRS AXIS: -45 DEGREES
QRSD INTERVAL: 130 MS
QRSD INTERVAL: 136 MS
QT INTERVAL: 362 MS
QT INTERVAL: 374 MS
QTC INTERVAL: 459 MS
QTC INTERVAL: 469 MS
RBC # BLD AUTO: 4.41 MILLION/UL (ref 3.88–5.62)
SODIUM SERPL-SCNC: 134 MMOL/L (ref 135–147)
T WAVE AXIS: 12 DEGREES
T WAVE AXIS: 5 DEGREES
TRIGL SERPL-MCNC: 71 MG/DL
VENTRICULAR RATE: 95 BPM
VENTRICULAR RATE: 97 BPM
WBC # BLD AUTO: 16.48 THOUSAND/UL (ref 4.31–10.16)

## 2023-11-10 PROCEDURE — 94640 AIRWAY INHALATION TREATMENT: CPT

## 2023-11-10 PROCEDURE — 80061 LIPID PANEL: CPT | Performed by: INTERNAL MEDICINE

## 2023-11-10 PROCEDURE — 93005 ELECTROCARDIOGRAM TRACING: CPT

## 2023-11-10 PROCEDURE — 85730 THROMBOPLASTIN TIME PARTIAL: CPT | Performed by: INTERNAL MEDICINE

## 2023-11-10 PROCEDURE — 94660 CPAP INITIATION&MGMT: CPT

## 2023-11-10 PROCEDURE — 85027 COMPLETE CBC AUTOMATED: CPT | Performed by: INTERNAL MEDICINE

## 2023-11-10 PROCEDURE — 80048 BASIC METABOLIC PNL TOTAL CA: CPT | Performed by: INTERNAL MEDICINE

## 2023-11-10 PROCEDURE — 99232 SBSQ HOSP IP/OBS MODERATE 35: CPT | Performed by: INTERNAL MEDICINE

## 2023-11-10 PROCEDURE — 94760 N-INVAS EAR/PLS OXIMETRY 1: CPT

## 2023-11-10 PROCEDURE — 93010 ELECTROCARDIOGRAM REPORT: CPT | Performed by: INTERNAL MEDICINE

## 2023-11-10 RX ORDER — LEVALBUTEROL INHALATION SOLUTION 1.25 MG/3ML
1.25 SOLUTION RESPIRATORY (INHALATION)
Status: DISCONTINUED | OUTPATIENT
Start: 2023-11-10 | End: 2023-11-11 | Stop reason: HOSPADM

## 2023-11-10 RX ORDER — NITROGLYCERIN 0.4 MG/1
0.4 TABLET SUBLINGUAL
Status: DISCONTINUED | OUTPATIENT
Start: 2023-11-10 | End: 2023-11-11 | Stop reason: HOSPADM

## 2023-11-10 RX ORDER — HEPARIN SODIUM 5000 [USP'U]/ML
5000 INJECTION, SOLUTION INTRAVENOUS; SUBCUTANEOUS EVERY 8 HOURS SCHEDULED
Status: DISCONTINUED | OUTPATIENT
Start: 2023-11-10 | End: 2023-11-11 | Stop reason: HOSPADM

## 2023-11-10 RX ORDER — FAMOTIDINE 20 MG/1
20 TABLET, FILM COATED ORAL DAILY
Status: DISCONTINUED | OUTPATIENT
Start: 2023-11-10 | End: 2023-11-11 | Stop reason: HOSPADM

## 2023-11-10 RX ORDER — POLYETHYLENE GLYCOL 3350 17 G/17G
17 POWDER, FOR SOLUTION ORAL DAILY PRN
Status: DISCONTINUED | OUTPATIENT
Start: 2023-11-10 | End: 2023-11-11 | Stop reason: HOSPADM

## 2023-11-10 RX ORDER — DOCUSATE SODIUM 100 MG/1
100 CAPSULE, LIQUID FILLED ORAL 2 TIMES DAILY
Status: DISCONTINUED | OUTPATIENT
Start: 2023-11-10 | End: 2023-11-11 | Stop reason: HOSPADM

## 2023-11-10 RX ORDER — ATORVASTATIN CALCIUM 40 MG/1
80 TABLET, FILM COATED ORAL
Status: DISCONTINUED | OUTPATIENT
Start: 2023-11-10 | End: 2023-11-10

## 2023-11-10 RX ORDER — METHYLPREDNISOLONE SODIUM SUCCINATE 40 MG/ML
40 INJECTION, POWDER, LYOPHILIZED, FOR SOLUTION INTRAMUSCULAR; INTRAVENOUS EVERY 12 HOURS SCHEDULED
Status: DISCONTINUED | OUTPATIENT
Start: 2023-11-10 | End: 2023-11-11

## 2023-11-10 RX ORDER — SENNOSIDES 8.6 MG
1 TABLET ORAL 2 TIMES DAILY
Status: DISCONTINUED | OUTPATIENT
Start: 2023-11-10 | End: 2023-11-11 | Stop reason: HOSPADM

## 2023-11-10 RX ORDER — ATORVASTATIN CALCIUM 40 MG/1
40 TABLET, FILM COATED ORAL
Status: DISCONTINUED | OUTPATIENT
Start: 2023-11-10 | End: 2023-11-11 | Stop reason: HOSPADM

## 2023-11-10 RX ADMIN — CARVEDILOL 6.25 MG: 3.12 TABLET, FILM COATED ORAL at 09:01

## 2023-11-10 RX ADMIN — CARVEDILOL 6.25 MG: 3.12 TABLET, FILM COATED ORAL at 15:43

## 2023-11-10 RX ADMIN — IPRATROPIUM BROMIDE 0.5 MG: 0.5 SOLUTION RESPIRATORY (INHALATION) at 02:59

## 2023-11-10 RX ADMIN — LEVALBUTEROL HYDROCHLORIDE 1.25 MG: 1.25 SOLUTION RESPIRATORY (INHALATION) at 07:17

## 2023-11-10 RX ADMIN — DOCUSATE SODIUM 100 MG: 100 CAPSULE, LIQUID FILLED ORAL at 10:27

## 2023-11-10 RX ADMIN — DOCUSATE SODIUM 100 MG: 100 CAPSULE, LIQUID FILLED ORAL at 18:11

## 2023-11-10 RX ADMIN — ATORVASTATIN CALCIUM 40 MG: 40 TABLET, FILM COATED ORAL at 15:44

## 2023-11-10 RX ADMIN — AZITHROMYCIN MONOHYDRATE 500 MG: 500 TABLET ORAL at 15:44

## 2023-11-10 RX ADMIN — SENNOSIDES 8.6 MG: 8.6 TABLET, FILM COATED ORAL at 18:11

## 2023-11-10 RX ADMIN — LEVALBUTEROL HYDROCHLORIDE 1.25 MG: 1.25 SOLUTION RESPIRATORY (INHALATION) at 13:20

## 2023-11-10 RX ADMIN — SENNOSIDES 8.6 MG: 8.6 TABLET, FILM COATED ORAL at 10:26

## 2023-11-10 RX ADMIN — FAMOTIDINE 20 MG: 20 TABLET ORAL at 12:25

## 2023-11-10 RX ADMIN — GUAIFENESIN 1200 MG: 600 TABLET ORAL at 18:11

## 2023-11-10 RX ADMIN — LEVALBUTEROL HYDROCHLORIDE 1.25 MG: 1.25 SOLUTION RESPIRATORY (INHALATION) at 19:22

## 2023-11-10 RX ADMIN — IPRATROPIUM BROMIDE 0.5 MG: 0.5 SOLUTION RESPIRATORY (INHALATION) at 07:17

## 2023-11-10 RX ADMIN — ASPIRIN 81 MG: 81 TABLET, COATED ORAL at 10:25

## 2023-11-10 RX ADMIN — MONTELUKAST 10 MG: 10 TABLET, FILM COATED ORAL at 21:25

## 2023-11-10 RX ADMIN — METHYLPREDNISOLONE SODIUM SUCCINATE 40 MG: 40 INJECTION, POWDER, FOR SOLUTION INTRAMUSCULAR; INTRAVENOUS at 21:25

## 2023-11-10 RX ADMIN — IPRATROPIUM BROMIDE 0.5 MG: 0.5 SOLUTION RESPIRATORY (INHALATION) at 19:22

## 2023-11-10 RX ADMIN — METHYLPREDNISOLONE SODIUM SUCCINATE 40 MG: 40 INJECTION, POWDER, FOR SOLUTION INTRAMUSCULAR; INTRAVENOUS at 05:51

## 2023-11-10 RX ADMIN — HEPARIN SODIUM 5000 UNITS: 5000 INJECTION INTRAVENOUS; SUBCUTANEOUS at 18:11

## 2023-11-10 RX ADMIN — EZETIMIBE 10 MG: 10 TABLET ORAL at 09:00

## 2023-11-10 RX ADMIN — HEPARIN SODIUM 17.1 UNITS/KG/HR: 10000 INJECTION, SOLUTION INTRAVENOUS at 14:08

## 2023-11-10 RX ADMIN — LEVALBUTEROL HYDROCHLORIDE 1.25 MG: 1.25 SOLUTION RESPIRATORY (INHALATION) at 02:59

## 2023-11-10 RX ADMIN — GUAIFENESIN 1200 MG: 600 TABLET ORAL at 09:00

## 2023-11-10 RX ADMIN — NITROGLYCERIN 0.4 MG: 0.4 TABLET SUBLINGUAL at 10:25

## 2023-11-10 RX ADMIN — IPRATROPIUM BROMIDE 0.5 MG: 0.5 SOLUTION RESPIRATORY (INHALATION) at 13:20

## 2023-11-10 NOTE — ASSESSMENT & PLAN NOTE
Patient reported shortness of breath in the past 5 days. He was seen by outpatient provider was started on levofloxacin and prednisone however he was supposed to get his treatment at the pharmacy today, and he had suddenly worsening shortness of breath. Was transferred via EMS to the hospital.  Per EMS oxygen saturation 88 to 89% but improved with breathing treatments. Received 125 mg Solu-Medrol prehospital.  Chest x-ray showed extensive bilateral calcified pleural plaques, increased opacification of the right medial lung base, which may represent atelectasis or infection/inflammation, increased blunting of the right CP angle, possibly a small pleural effusion versus pleural thickening. Procalcitonin negative x2, no leukocytosis. Tested positive for COVID but he had COVID a month ago. BNP slightly elevated, patient does not appear to be fluid overload. Patient received 40 IV Lasix yesterday for flash pulmonary edema concern. CTA did not showed PE or acute pulmonary pathology, showed emphysema and low lung volume with bilateral rounded atelectasis and pleural changes consistent with asbestosis. Likely COPD exacerbation.   Wean off oxygen to maintain saturation greater than 88%

## 2023-11-10 NOTE — ASSESSMENT & PLAN NOTE
Lab Results   Component Value Date    HSTNI0 6,753 (H) 11/09/2023    HSTNI2 7,188 (H) 11/09/2023    HSTNID2 435 (H) 11/09/2023    HSTNI4 6,086 (H) 11/09/2023    HSTNID4 -667 11/09/2023      No chest pain  EKG showed sinus tachycardia with nonspecific changes, likely tachycardia related. However patient had echo that showed LVEF 90% with diastolic dysfunction 1, basal inferior wall echinosis, hypokinesis of basal inferior lateral wall; moderate to severe aortic stenosis, mild tricuspid regurgitation. Type II MI in the setting of hypertension, tachycardia secondary to respiratory distress combined with moderate to severe aortic stenosis. Cardiology, okay to discontinue heparin. Continue aspirin, carvedilol.   Start statin  Patient will have cardiology outpatient follow-up for ischemic evaluation also  for the aortic stenosis

## 2023-11-10 NOTE — ASSESSMENT & PLAN NOTE
With pulmonology as outpatient. On Trelegy Ellipta, albuterol as needed, Singulair, nebulizer as home medication  Patient denied worsening cough. Shortness of breath in the past 5 days. We will treat as COPD exacerbation for now  Pulmonology was consulted, appreciate recommendations. Continue nebulizers, Solu-Medrol was decreased to 40 mg every 12 hours. Plan to switch to oral prednisone with marry tomorrow. Patient reported improvement.   Wean off his oxygen to maintain saturation greater than 88%

## 2023-11-10 NOTE — PLAN OF CARE
Problem: Potential for Falls  Goal: Patient will remain free of falls  Description: INTERVENTIONS:  - Educate patient/family on patient safety including physical limitations  - Instruct patient to call for assistance with activity   - Consult OT/PT to assist with strengthening/mobility   - Keep Call bell within reach  - Keep bed low and locked with side rails adjusted as appropriate  - Keep care items and personal belongings within reach  - Initiate and maintain comfort rounds  - Make Fall Risk Sign visible to staff  - Offer Toileting every 2 Hours, in advance of need  - Initiate/Maintain bed/chair alarm  - Obtain necessary fall risk management equipment: yellow bracelet/socks  - Apply yellow socks and bracelet for high fall risk patients  - Consider moving patient to room near nurses station  Outcome: Progressing     Problem: MOBILITY - ADULT  Goal: Maintain or return to baseline ADL function  Description: INTERVENTIONS:  -  Assess patient's ability to carry out ADLs; assess patient's baseline for ADL function and identify physical deficits which impact ability to perform ADLs (bathing, care of mouth/teeth, toileting, grooming, dressing, etc.)  - Assess/evaluate cause of self-care deficits   - Assess range of motion  - Assess patient's mobility; develop plan if impaired  - Assess patient's need for assistive devices and provide as appropriate  - Encourage maximum independence but intervene and supervise when necessary  - Involve family in performance of ADLs  - Assess for home care needs following discharge   - Consider OT consult to assist with ADL evaluation and planning for discharge  - Provide patient education as appropriate  Outcome: Progressing  Goal: Maintains/Returns to pre admission functional level  Description: INTERVENTIONS:  - Perform BMAT or MOVE assessment daily.   - Set and communicate daily mobility goal to care team and patient/family/caregiver.    - Collaborate with rehabilitation services on mobility goals if consulted  - Perform Range of Motion 3 times a day. - Reposition patient every 2 hours.   - Dangle patient 3 times a day  - Stand patient 3 times a day  - Ambulate patient 3 times a day  - Out of bed to chair 3 times a day   - Out of bed for meals 3 times a day  - Out of bed for toileting  - Record patient progress and toleration of activity level   Outcome: Progressing     Problem: CARDIOVASCULAR - ADULT  Goal: Maintains optimal cardiac output and hemodynamic stability  Description: INTERVENTIONS:  - Monitor I/O, vital signs and rhythm  - Monitor for S/S and trends of decreased cardiac output  - Administer and titrate ordered vasoactive medications to optimize hemodynamic stability  - Assess quality of pulses, skin color and temperature  - Assess for signs of decreased coronary artery perfusion  - Instruct patient to report change in severity of symptoms  Outcome: Progressing  Goal: Absence of cardiac dysrhythmias or at baseline rhythm  Description: INTERVENTIONS:  - Continuous cardiac monitoring, vital signs, obtain 12 lead EKG if ordered  - Administer antiarrhythmic and heart rate control medications as ordered  - Monitor electrolytes and administer replacement therapy as ordered  Outcome: Progressing     Problem: RESPIRATORY - ADULT  Goal: Achieves optimal ventilation and oxygenation  Description: INTERVENTIONS:  - Assess for changes in respiratory status  - Assess for changes in mentation and behavior  - Position to facilitate oxygenation and minimize respiratory effort  - Oxygen administered by appropriate delivery if ordered  - Initiate smoking cessation education as indicated  - Encourage broncho-pulmonary hygiene including cough, deep breathe, Incentive Spirometry  - Assess the need for suctioning and aspirate as needed  - Assess and instruct to report SOB or any respiratory difficulty  - Respiratory Therapy support as indicated  Outcome: Progressing     Problem: HEMATOLOGIC - ADULT  Goal: Maintains hematologic stability  Description: INTERVENTIONS  - Assess for signs and symptoms of bleeding or hemorrhage  - Monitor labs  - Administer supportive blood products/factors as ordered and appropriate  Outcome: Progressing     Problem: Prexisting or High Potential for Compromised Skin Integrity  Goal: Skin integrity is maintained or improved  Description: INTERVENTIONS:  - Identify patients at risk for skin breakdown  - Assess and monitor skin integrity  - Assess and monitor nutrition and hydration status  - Monitor labs   - Assess for incontinence   - Turn and reposition patient  - Assist with mobility/ambulation  - Relieve pressure over bony prominences  - Avoid friction and shearing  - Provide appropriate hygiene as needed including keeping skin clean and dry  - Evaluate need for skin moisturizer/barrier cream  - Collaborate with interdisciplinary team   - Patient/family teaching  - Consider wound care consult   Outcome: Progressing

## 2023-11-10 NOTE — PROGRESS NOTES
Progress Note - Cardiology   Rowdy Rahman 80 y.o. male MRN: [id number]  Unit/Bed#: -01 Encounter: [id number]        Problem List:  Principal Problem:    Acute respiratory failure with hypoxia ([address])  Active Problems:    Essential hypertension    COPD (chronic obstructive pulmonary disease) (HCC)    History of DVT (deep vein thrombosis)    COVID-19 virus infection    SIRS (systemic inflammatory response syndrome) ([facility])    DARIUS (obstructive sleep apnea)    Elevated troponin    Hx of coronary artery disease      Assessment:  Acute respiratory failure, multifactorial  Initially required 2 L nasal cannula but due to work of breathing was intermittently on BiPAP on the first night of his admission  Likely combination of underlying lung disease and asbestos exposure  Abnormal troponin 200 --> 3056 --> 6155 --> 6753 --> 7188  EKG showing sinus rhythm and sinus tachycardia with underlying left anterior fascicular and bifascicular block with some nonspecific ST changes in anterior leads while tachycardic, no ST elevation  Probable non-MI related troponin elevation secondary to his respiratory issues  Acute COVID infection  Initially had COVID 1 month ago but then tested positive again in the ER  Hx of CAD  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  2020 echo: LVEF 55-60%, G1DD, mild to moderate AS  Accelerated hypertension  Peak blood pressure up to 224/107 in the ED  Valvular heart disease  11/9/2023 Echo: LVEF 89%, grade 1 diastolic dysfunction, inferior regional wall motion abnormalities, moderate to severe aortic stenosis  COPD  Dyslipidemia  Bifascicular block    Plan/ Discussion:  He is having sudden onset chest pressure this morning so we will check a stat EKG  Give nitroglycerin sublingual  Start aspirin 81 daily  Start Lipitor 40 daily  Continue carvedilol  Continue Zetia  He will need eventual TAVR work-up and an ischemic evaluation. Unless he has dynamic EKG changes we are still planning to pursue this once his respiratory issues settle down. Likely as outpatient. Addendum: EKG's did not show any ischemic changes. Will trial SL nitroglycerin, d/w nursing. Add pepcid as well for possible GERD    Subjective:  Overnight felt okay and breathing is somewhat better but this morning about 5 or 10 minutes ago developed 3-4 anterior chest pressure which is nonradiating.     Vitals:  Vitals:    11/09/23 0905   Weight: 111 kg (245 lb)   ,  Vitals:    11/09/23 2334 11/10/23 0717 11/10/23 0723 11/10/23 0837   BP: 117/60   144/83   BP Location: Right arm      Pulse: 84   78   Resp: 18   19   Temp: 98.9 °F (37.2 °C)   97.5 °F (36.4 °C)   TempSrc: Oral      SpO2: 95% 97% 98% 99%   Weight:       Height:           Exam:  General: Alert awake and oriented, no acute distress  Heart:  Regular rate and rhythm, no murmurs, Normal S1, no edema    Respiratory effort/ Lungs: Slightly dyspneic on 2-3 L nasal cannula, clear bilaterally without wheezing, rales, crackles   Abdominal: Non-tender to palpation, + bowel sounds, soft, no masses or distension  Lower Limbs:  No edema            Telemetry:           Medications:    Current Facility-Administered Medications:     acetaminophen (TYLENOL) tablet 650 mg, 650 mg, Oral, Q6H PRN, Ray Babinski, MD    azithromycin (ZITHROMAX) tablet 500 mg, 500 mg, Oral, Q24H, Ray Babinski, MD, 500 mg at 11/09/23 1709    carvedilol (COREG) tablet 6.25 mg, 6.25 mg, Oral, BID With Meals, Jaya Mcclelland PA-C, 6.25 mg at 11/10/23 0901    docusate sodium (COLACE) capsule 100 mg, 100 mg, Oral, BID, Ray Babinski, MD    ezetimibe (ZETIA) tablet 10 mg, 10 mg, Oral, Once per day on Mon Wed Fri, Ray Babinski, MD, 10 mg at 11/10/23 0900    guaiFENesin (MUCINEX) 12 hr tablet 1,200 mg, 1,200 mg, Oral, BID, Ray Babinski, MD, 1,200 mg at 11/10/23 0900    heparin (porcine) 25,000 units in 0.45% NaCl 250 mL infusion (premix), 3-20 Units/kg/hr (Order-Specific), Intravenous, Titrated, Adrienne Brands, PA-C, Last Rate: 15.4 mL/hr at 11/10/23 0701, 17.1 Units/kg/hr at 11/10/23 0701    heparin (porcine) injection 2,000 Units, 2,000 Units, Intravenous, Q6H PRN, Adrienne Brands, PA-C, 2,000 Units at 11/09/23 1929    heparin (porcine) injection 4,000 Units, 4,000 Units, Intravenous, Q6H PRN, Adrienne Brands, PA-C, 4,000 Units at 11/09/23 1049    ipratropium (ATROVENT) 0.02 % inhalation solution 0.5 mg, 0.5 mg, Nebulization, Q6H, Eren Anguiano MD, 0.5 mg at 11/10/23 0717    labetalol (NORMODYNE) injection 10 mg, 10 mg, Intravenous, Q6H PRN, Eren Anguiano MD    levalbuterol (XOPENEX) inhalation solution 1.25 mg, 1.25 mg, Nebulization, Q6H, Eren Anguiano MD, 1.25 mg at 11/10/23 0717    methylPREDNISolone sodium succinate (Solu-MEDROL) injection 40 mg, 40 mg, Intravenous, Q12H Maude GEANO CRNP    montelukast (SINGULAIR) tablet 10 mg, 10 mg, Oral, HS, Eren Anguiano MD, 10 mg at 11/09/23 2226    polyethylene glycol (MIRALAX) packet 17 g, 17 g, Oral, Daily PRN, Eren Anguiano MD    senna (SENOKOT) tablet 8.6 mg, 1 tablet, Oral, BID, Eren Anguiano MD      Labs/Data:        Results from last 7 days   Lab Units 11/10/23  0142 11/09/23  0521 11/09/23  0027   WBC Thousand/uL 16.48* 8.71 6.22   HEMOGLOBIN g/dL 13.0 13.8 13.9   HEMATOCRIT % 41.6 42.8 42.7   PLATELETS Thousands/uL 319 338 330     Results from last 7 days   Lab Units 11/10/23  0142 11/09/23  0521 11/08/23  1538   POTASSIUM mmol/L 4.9 4.8 4.4   CHLORIDE mmol/L 107 106 106   CO2 mmol/L 19* 26 24   BUN mg/dL 39* 19 14   CREATININE mg/dL 1.28 1.01 0.96

## 2023-11-10 NOTE — PROGRESS NOTES
[facility]  Progress Note  Name: Qamar Younger  MRN: [id number]  Unit/Bed#: -01 I Date of Admission: 11/8/2023   Date of Service: 11/10/2023 I Hospital Day: 2    Assessment/Plan   * Acute respiratory failure with hypoxia [facility])  Assessment & Plan  Patient reported shortness of breath in the past 5 days. He was seen by outpatient provider was started on levofloxacin and prednisone however he was supposed to get his treatment at the pharmacy today, and he had suddenly worsening shortness of breath. Was transferred via EMS to the hospital.  Per EMS oxygen saturation 88 to 89% but improved with breathing treatments. Received 125 mg Solu-Medrol prehospital.  Chest x-ray showed extensive bilateral calcified pleural plaques, increased opacification of the right medial lung base, which may represent atelectasis or infection/inflammation, increased blunting of the right CP angle, possibly a small pleural effusion versus pleural thickening. Procalcitonin negative x2, no leukocytosis. Tested positive for COVID but he had COVID a month ago. BNP slightly elevated, patient does not appear to be fluid overload. Patient received 40 IV Lasix yesterday for flash pulmonary edema concern. CTA did not showed PE or acute pulmonary pathology, showed emphysema and low lung volume with bilateral rounded atelectasis and pleural changes consistent with asbestosis. Likely COPD exacerbation. Wean off oxygen to maintain saturation greater than 88%      Hx of coronary artery disease  Assessment & Plan  MI in 2008, stents on RCA       Elevated troponin  Assessment & Plan  Lab Results   Component Value Date    HSTNI0 6,753 (H) 11/09/2023    HSTNI2 7,188 (H) 11/09/2023    HSTNID2 435 (H) 11/09/2023    HSTNI4 6,086 (H) 11/09/2023    HSTNID4 -667 11/09/2023      No chest pain  EKG showed sinus tachycardia with nonspecific changes, likely tachycardia related.   However patient had echo that showed LVEF 60% with diastolic dysfunction 1, basal inferior wall echinosis, hypokinesis of basal inferior lateral wall; moderate to severe aortic stenosis, mild tricuspid regurgitation. Type II MI in the setting of hypertension, tachycardia secondary to respiratory distress combined with moderate to severe aortic stenosis. Cardiology, okay to discontinue heparin. Continue aspirin, carvedilol. Start statin  Patient will have cardiology outpatient follow-up for ischemic evaluation also  for the aortic stenosis      DARIUS (obstructive sleep apnea)  Assessment & Plan  Continue CPAP at bedtime     COVID-19 virus infection  Assessment & Plan  Patient tested positive for COVID on 11/8 however patient reported he had a positive test a month ago or so. Except shortness of breath patient has no other symptoms COVID-related. Given the fact that patient had COVID a month ago unlikely he has reinfection. Monitor. 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 He followed with hematology oncology in 2021  Currently not on any anticoagulation. CTA showed no PE     COPD (chronic obstructive pulmonary disease) (HCC)  Assessment & Plan  With pulmonology as outpatient. On Trelegy Ellipta, albuterol as needed, Singulair, nebulizer as home medication  Patient denied worsening cough. Shortness of breath in the past 5 days. We will treat as COPD exacerbation for now  Pulmonology was consulted, appreciate recommendations. Continue nebulizers, Solu-Medrol was decreased to 40 mg every 12 hours. Plan to switch to oral prednisone with marry tomorrow. Patient reported improvement.   Wean off his oxygen to maintain saturation greater than 88%    Essential hypertension  Assessment & Plan  Controlled  Continue carvedilol 6.25 mg twice daily    SIRS (systemic inflammatory response syndrome) (HCC)-resolved as of 11/10/2023  Assessment & Plan  POA with tachycardia and tachypnea likely in the setting of respiratory distress  Does not meet sepsis criteria. VTE Pharmacologic Prophylaxis: VTE Score: 9 High Risk (Score >/= 5) - Pharmacological DVT Prophylaxis Ordered: heparin. Sequential Compression Devices Ordered. Patient Centered Rounds: I performed bedside rounds with nursing staff today. Discussions with Specialists or Other Care Team Provider: cm, pt, ot, pulmonology    Education and Discussions with Family / Patient: Patient declined call to . Total Time Spent on Date of Encounter in care of patient: 50 mins. This time was spent on one or more of the following: performing physical exam; counseling and coordination of care; obtaining or reviewing history; documenting in the medical record; reviewing/ordering tests, medications or procedures; communicating with other healthcare professionals and discussing with patient's family/caregivers. Current Length of Stay: 2 day(s)  Current Patient Status: Inpatient   Certification Statement: The patient will continue to require additional inpatient hospital stay due to respiratory failure   Discharge Plan: Anticipate discharge tomorrow to home. Code Status: Level 1 - Full Code    Subjective:   Feeling better than yesterday. Shortness of breath improved. No chest pain. Objective:     Vitals:   Temp (24hrs), Av.2 °F (36.8 °C), Min:97.5 °F (36.4 °C), Max:98.9 °F (37.2 °C)    Temp:  [97.5 °F (36.4 °C)-98.9 °F (37.2 °C)] 97.6 °F (36.4 °C)  HR:  [78-85] 85  Resp:  [18-20] 19  BP: (116-146)/(59-83) 146/81  SpO2:  [91 %-99 %] 93 %  Body mass index is 33.23 kg/m². Input and Output Summary (last 24 hours): Intake/Output Summary (Last 24 hours) at 11/10/2023 1704  Last data filed at 11/10/2023 0701  Gross per 24 hour   Intake 395.06 ml   Output 1050 ml   Net -654.94 ml       Physical Exam:   Physical Exam  Vitals and nursing note reviewed. Constitutional:       General: He is not in acute distress. Appearance: He is not diaphoretic. HENT:      Head: Normocephalic. Eyes:      General: No scleral icterus. Right eye: No discharge. Left eye: No discharge. Cardiovascular:      Rate and Rhythm: Normal rate and regular rhythm. Heart sounds: Murmur heard. Pulmonary:      Effort: Pulmonary effort is normal. No respiratory distress. Breath sounds: Normal breath sounds. No wheezing, rhonchi or rales. Abdominal:      General: There is no distension. Palpations: Abdomen is soft. Tenderness: There is no abdominal tenderness. There is no guarding or rebound. Musculoskeletal:      Cervical back: Normal range of motion. Right lower leg: No edema. Left lower leg: No edema. Skin:     General: Skin is warm. Neurological:      Mental Status: He is alert and oriented to person, place, and time. Psychiatric:         Mood and Affect: Mood normal.         Behavior: Behavior normal.         Thought Content: Thought content normal.         Judgment: Judgment normal.          Additional Data:     Labs:  Results from last 7 days   Lab Units 11/10/23  0142 11/09/23  0027 11/08/23  1538   WBC Thousand/uL 16.48*   < > 9.24   HEMOGLOBIN g/dL 13.0   < > 13.8   HEMATOCRIT % 41.6   < > 42.8   PLATELETS Thousands/uL 319   < > 311   NEUTROS PCT %  --   --  77*   LYMPHS PCT %  --   --  9*   MONOS PCT %  --   --  8   EOS PCT %  --   --  5    < > = values in this interval not displayed.      Results from last 7 days   Lab Units 11/10/23  0142 11/09/23  0521 11/08/23  1538   SODIUM mmol/L 134*   < > 138   POTASSIUM mmol/L 4.9   < > 4.4   CHLORIDE mmol/L 107   < > 106   CO2 mmol/L 19*   < > 24   BUN mg/dL 39*   < > 14   CREATININE mg/dL 1.28   < > 0.96   ANION GAP mmol/L 8   < > 8   CALCIUM mg/dL 8.7   < > 9.1   ALBUMIN g/dL  --   --  4.0   TOTAL BILIRUBIN mg/dL  --   --  0.62   ALK PHOS U/L  --   --  83   ALT U/L  --   --  21 AST U/L  --   --  19   GLUCOSE RANDOM mg/dL 143*   < > 115    < > = values in this interval not displayed. Results from last 7 days   Lab Units 11/09/23  0027   INR  1.11     Results from last 7 days   Lab Units 11/09/23  1710   POC GLUCOSE mg/dl 139         Results from last 7 days   Lab Units 11/09/23  0521 11/08/23  1538   LACTIC ACID mmol/L  --  1.2   PROCALCITONIN ng/ml 0.07 0.07       Lines/Drains:  Invasive Devices       Peripheral Intravenous Line  Duration             Peripheral IV 11/08/23 Proximal;Right;Ventral (anterior) Forearm 2 days                          Imaging: Reviewed radiology reports from this admission including: ECHO    Recent Cultures (last 7 days):   Results from last 7 days   Lab Units 11/08/23  1538   BLOOD CULTURE  No Growth at 24 hrs. No Growth at 24 hrs.        Last 24 Hours Medication List:   Current Facility-Administered Medications   Medication Dose Route Frequency Provider Last Rate    acetaminophen  650 mg Oral Q6H PRN Saulo Yap MD      aspirin  81 mg Oral Daily Neri Mcclelland PA-C      atorvastatin  40 mg Oral Daily With [employer], HARRY      azithromycin  500 mg Oral Q24H Saulo Yap MD      carvedilol  6.25 mg Oral BID With Meals Neri Mcclelland PA-C      docusate sodium  100 mg Oral BID Saulo Yap MD      ezetimibe  10 mg Oral Once per day on Mon Wed Fri Saulo Yap MD      famotidine  20 mg Oral Daily Man Mcclelland PA-C      guaiFENesin  1,200 mg Oral BID Saulo Yap MD      heparin (porcine)  5,000 Units Subcutaneous [facility] Saulo Yap MD      ipratropium  0.5 mg Nebulization TID Cuate Shairf MD      labetalol  10 mg Intravenous Q6H PRN Saulo Yap MD      levalbuterol  1.25 mg Nebulization TID Cuate Sharif MD      methylPREDNISolone sodium succinate  40 mg Intravenous Q12H [address] ERICA Mcallister      montelukast  10 mg Oral HS Saulo Yap MD      nitroglycerin 0.4 mg Sublingual Q5 Min PRN Jose J Mcclelland PA-C      polyethylene glycol  17 g Oral Daily PRN Torrie Bailey MD      senna  1 tablet Oral BID Torrie Bailey MD          Today, Patient Was Seen By: Torrie Bailey MD    **Please Note: This note may have been constructed using a voice recognition system. **

## 2023-11-10 NOTE — PLAN OF CARE
Problem: Potential for Falls  Goal: Patient will remain free of falls  Description: INTERVENTIONS:  - Educate patient/family on patient safety including physical limitations  - Instruct patient to call for assistance with activity   - Consult OT/PT to assist with strengthening/mobility   - Keep Call bell within reach  - Keep bed low and locked with side rails adjusted as appropriate  - Keep care items and personal belongings within reach  - Initiate and maintain comfort rounds  - Make Fall Risk Sign visible to staff  - Offer Toileting every 2 Hours, in advance of need  - Initiate/Maintain bed alarm  - Obtain necessary fall risk management equipment:   - Apply yellow socks and bracelet for high fall risk patients  - Consider moving patient to room near nurses station  Outcome: Progressing     Problem: MOBILITY - ADULT  Goal: Maintain or return to baseline ADL function  Description: INTERVENTIONS:  -  Assess patient's ability to carry out ADLs; assess patient's baseline for ADL function and identify physical deficits which impact ability to perform ADLs (bathing, care of mouth/teeth, toileting, grooming, dressing, etc.)  - Assess/evaluate cause of self-care deficits   - Assess range of motion  - Assess patient's mobility; develop plan if impaired  - Assess patient's need for assistive devices and provide as appropriate  - Encourage maximum independence but intervene and supervise when necessary  - Involve family in performance of ADLs  - Assess for home care needs following discharge   - Consider OT consult to assist with ADL evaluation and planning for discharge  - Provide patient education as appropriate  Outcome: Progressing  Goal: Maintains/Returns to pre admission functional level  Description: INTERVENTIONS:  - Perform BMAT or MOVE assessment daily.   - Set and communicate daily mobility goal to care team and patient/family/caregiver.    - Collaborate with rehabilitation services on mobility goals if consulted  - Perform Range of Motion 3 times a day. - Reposition patient every 3 hours.   - Dangle patient 3 times a day  - Stand patient 3 times a day  - Ambulate patient 3 times a day  - Out of bed to chair 3 times a day   - Out of bed for meals 3 times a day  - Out of bed for toileting  - Record patient progress and toleration of activity level   Outcome: Progressing     Problem: CARDIOVASCULAR - ADULT  Goal: Maintains optimal cardiac output and hemodynamic stability  Description: INTERVENTIONS:  - Monitor I/O, vital signs and rhythm  - Monitor for S/S and trends of decreased cardiac output  - Administer and titrate ordered vasoactive medications to optimize hemodynamic stability  - Assess quality of pulses, skin color and temperature  - Assess for signs of decreased coronary artery perfusion  - Instruct patient to report change in severity of symptoms  Outcome: Progressing  Goal: Absence of cardiac dysrhythmias or at baseline rhythm  Description: INTERVENTIONS:  - Continuous cardiac monitoring, vital signs, obtain 12 lead EKG if ordered  - Administer antiarrhythmic and heart rate control medications as ordered  - Monitor electrolytes and administer replacement therapy as ordered  Outcome: Progressing     Problem: RESPIRATORY - ADULT  Goal: Achieves optimal ventilation and oxygenation  Description: INTERVENTIONS:  - Assess for changes in respiratory status  - Assess for changes in mentation and behavior  - Position to facilitate oxygenation and minimize respiratory effort  - Oxygen administered by appropriate delivery if ordered  - Initiate smoking cessation education as indicated  - Encourage broncho-pulmonary hygiene including cough, deep breathe, Incentive Spirometry  - Assess the need for suctioning and aspirate as needed  - Assess and instruct to report SOB or any respiratory difficulty  - Respiratory Therapy support as indicated  Outcome: Progressing     Problem: HEMATOLOGIC - ADULT  Goal: Maintains hematologic stability  Description: INTERVENTIONS  - Assess for signs and symptoms of bleeding or hemorrhage  - Monitor labs  - Administer supportive blood products/factors as ordered and appropriate  Outcome: Progressing     Problem: Prexisting or High Potential for Compromised Skin Integrity  Goal: Skin integrity is maintained or improved  Description: INTERVENTIONS:  - Identify patients at risk for skin breakdown  - Assess and monitor skin integrity  - Assess and monitor nutrition and hydration status  - Monitor labs   - Assess for incontinence   - Turn and reposition patient  - Assist with mobility/ambulation  - Relieve pressure over bony prominences  - Avoid friction and shearing  - Provide appropriate hygiene as needed including keeping skin clean and dry  - Evaluate need for skin moisturizer/barrier cream  - Collaborate with interdisciplinary team   - Patient/family teaching  - Consider wound care consult   Outcome: Progressing

## 2023-11-10 NOTE — CASE MANAGEMENT
Case Management Assessment & Discharge Planning Note    Patient name Monik Archuleta  Location [address] [facility] [id number] MRN [id number]  : 1940 Date 11/10/2023       Current Admission Date: 2023  Current Admission Diagnosis:Acute respiratory failure with hypoxia [facility])   Patient Active Problem List    Diagnosis Date Noted    DARIUS (obstructive sleep apnea) 2023    Elevated troponin 2023    Hx of coronary artery disease 2023    Acute respiratory failure with hypoxia ([address]) 2023    History of DVT (deep vein thrombosis) 2023    COVID-19 virus infection 2023    SIRS (systemic inflammatory response syndrome) ([address]) 2023    Hyponatremia 2018    LAKSHMI (acute kidney injury) ([address]) 2018    Severe sepsis ([address]) 2018    H/O asbestosis 2018    ASCVD (arteriosclerotic cardiovascular disease) 2018    Essential hypertension 2018    Other hyperlipidemia 2018    COPD (chronic obstructive pulmonary disease) ([address]) 2018      LOS (days): 2  Geometric Mean LOS (GMLOS) (days): 3.60  Days to GMLOS:1.8     OBJECTIVE:    Risk of Unplanned Readmission Score: 12.82         Current admission status: Inpatient       Preferred Pharmacy:   [facility].  [address]  Phone: [phone] Fax: [phone]    Primary Care Provider: ERICA Mann    Primary Insurance: [facility]  Secondary Insurance:     ASSESSMENT:  Active Health Care Proxies    There are no active Health Care Proxies on file. Readmission Root Cause  30 Day Readmission: No    Patient Information  Admitted from[de-identified] Home  Mental Status: Alert  During Assessment patient was accompanied by: Not accompanied during assessment  Assessment information provided by[de-identified] Patient  Primary Caregiver: Self  Support Systems: Family members  Home entry access options.  Select all that apply.: Stairs  Number of steps to enter home.: 8  Type of Current Residence: 2 story home  Upon entering residence, is there a bedroom on the main floor (no further steps)?: Yes  Upon entering residence, is there a bathroom on the main floor (no further steps)?: Yes  In the last 12 months, was there a time when you were not able to pay the mortgage or rent on time?: No  In the last 12 months, how many places have you lived?: 1  In the last 12 months, was there a time when you did not have a steady place to sleep or slept in a shelter (including now)?: No  Homeless/housing insecurity resource given?: N/A  Living Arrangements: Lives w/ Spouse/significant other    Activities of Daily Living Prior to Admission  Functional Status: Independent  Completes ADLs independently?: Yes  Ambulates independently?: Yes  Does patient use assisted devices?: Yes  Assisted Devices (DME) used: CPAP, Walker, [address]  DME Company Name (respiratory supplies): unsure  Does patient currently own DME?: Yes  What DME does the patient currently own?: CPAP, Nebulizer, Walker  Does patient have a history of Outpatient Therapy (PT/OT)?: No  Does the patient have a history of Short-Term Rehab?: No  Does patient have a history of HHC?: Yes (unsure of agency)  Does patient currently have [address]?: No    Patient Information Continued  Does patient have prescription coverage?: Yes  Within the past 12 months, you worried that your food would run out before you got the money to buy more.: Never true  Within the past 12 months, the food you bought just didn't last and you didn't have money to get more.: Never true  Food insecurity resource given?: N/A  Does patient receive dialysis treatments?: No  Does patient have a history of substance abuse?: No  Does patient have a history of Mental Health Diagnosis?: No    Means of Transportation  Means of Transport to Appts[de-identified] Drives Self  In the past 12 months, has lack of transportation kept you from medical appointments or from getting medications?: No  In the past 12 months, has lack of transportation kept you from meetings, work, or from getting things needed for daily living?: No  Was application for public transport provided?: N/A    DISCHARGE DETAILS:    Discharge planning discussed with[de-identified] patient  Freedom of Choice: Yes  Comments - Freedom of Choice: no anticipated dc needs  CM contacted family/caregiver?: No- see comments (reports being in contact with family)  Were Treatment Team discharge recommendations reviewed with patient/caregiver?: Yes  Did patient/caregiver verbalize understanding of patient care needs?: Yes  Were patient/caregiver advised of the risks associated with not following Treatment Team discharge recommendations?: Yes    [address]         Is the patient interested in [facility] at discharge?: No    DME Referral Provided  Referral made for DME?: No    Treatment Team Recommendation: Home  Discharge Destination Plan[de-identified] Home  Transport at Discharge : Family     IMM Given (Date):: 11/10/23  IMM Given to[de-identified] Patient     Additional Comments: Spoke with pt to discuss the role of CM and to discuss any help pt may need prior to dc. Pt lives with his granddaughter Adrianne Ravi in a 2 story home with 8 BRYAN; pt has a 1st floor setup. Pt performed ADL's indptly pta, pt uses a CPAP, nebulizer, and RW. Pt is unsure which company his CPAP is through. Pt has a hx of HHC but unsure of the agency. No hx of rehab. No hx of mental health or D&A treatment. Pt drives. [facility]. Pt's granddaughter or neighbor will transport home at dc. Pt is not interested in [facility] services at this time.

## 2023-11-10 NOTE — PROGRESS NOTES
Progress Note - Pulmonary   Randee Richards 80 y.o. male MRN: [id number]  Unit/Bed#: -Helen Encounter: [id number]    Assessment & Plan:    Acute hypoxic respiratory failure  Acute tracheobronchitis  Chronic pleural plaques, likely due to asbestos exposure  Recent COVID-19 infection  COPD, unclear severity  DARIUS on CPAP  Elevated troponin    Patient is currently on 2 L oxygen saturating at 93%. Titrate oxygen to maintain saturations >89%. Patient reports he does not use oxygen at home  Echo showing some mild ventricular wall abnormality, EF 60%, severe aortic stenosis with calcification  Home regimen: Breo ellipta, nebulizer -patient unsure what nebulizer medications he takes  Decrease Solu-Medrol 40 mg to every 12 hours  Continue with Xopenex/Atrovent every 6 hours  Continue Mucinex, Singulair  Continue using CPAP at night  He has managed by an outside pulmonologist, Dr Elina Le  Management of elevated troponin and further diuresis per primary team and cardiology    Chief Complaint:   "Breathing feels better than yesterday"    Subjective:   Patient was seen and examined at the bedside. Patient was resting in the bed in no acute distress. He states his breathing is feeling better than yesterday. He has some dyspnea on exertion. He reports an intermittent nonproductive cough. Patient denies fever, chills, shortness of breath, congestion, chest pain, palpitations, leg pain, or any other symptoms at this time. Objective:     Vitals: Blood pressure 144/83, pulse 78, temperature 97.5 °F (36.4 °C), resp. rate 19, height 6' (1.829 m), weight 111 kg (245 lb), SpO2 99 %. ,Body mass index is 33.23 kg/m².       Intake/Output Summary (Last 24 hours) at 11/10/2023 0858  Last data filed at 11/10/2023 0701  Gross per 24 hour   Intake 395.06 ml   Output 1350 ml   Net -954.94 ml       Invasive Devices       Peripheral Intravenous Line  Duration             Peripheral IV 11/08/23 Proximal;Right;Ventral (anterior) Forearm 1 day Physical Exam:  General appearance:   Alert and awake, in no acute distress  Head:   Normocephalic, without obvious abnormality, atraumatic  Eyes:   No scleral icterus   Lungs:  Pulmonary effort non labored at rest  Breath sounds: Decreased lung sounds. Wheezes present at bases. Cardiovascular:   Regular rate and rhythm. No murmurs. Capillary refill < 3 seconds. Abdomen:    No appreciable distension or tenderness  Extremities:   No deformity. No clubbing present. +1 edema to BLE. Skin:   Warm and dry. Intact. Color appropriate for ethnicity. Neurologic:   No acute focal deficits are noted. Psychiatric:   Normal mood. Answers questions appropriately. Labs: I have personally reviewed pertinent lab results. , CBC:   Lab Results   Component Value Date    WBC 16.48 (H) 11/10/2023    HGB 13.0 11/10/2023    HCT 41.6 11/10/2023    MCV 94 11/10/2023     11/10/2023    RBC 4.41 11/10/2023    MCH 29.5 11/10/2023    MCHC 31.3 (L) 11/10/2023    RDW 13.2 11/10/2023    MPV 9.0 11/10/2023   , CMP:   Lab Results   Component Value Date    SODIUM 134 (L) 11/10/2023    K 4.9 11/10/2023     11/10/2023    CO2 19 (L) 11/10/2023    BUN 39 (H) 11/10/2023    CREATININE 1.28 11/10/2023    CALCIUM 8.7 11/10/2023    EGFR 51 11/10/2023     Imaging and other studies: I have personally reviewed pertinent reports. and I have personally reviewed pertinent films in PACS    Chest x-ray portable 11/8/2023  Findings consistent with asbestos exposure. CTA chest PE study 11/8/2023  No PE or acute pulmonary pathology identified. Low lung volumes with bilateral rounded atelectasis and findings of prior asbestos exposure. Transthoracic echo 11/9/2023    Left Ventricle: Left ventricular cavity size is normal. Wall thickness is moderately increased. There is moderate concentric hypertrophy. The left ventricular ejection fraction is 60%.  Systolic function is normal. Diastolic function is mildly abnormal, consistent with grade I (abnormal) relaxation. The following segments are akinetic: basal inferior. The following segments are hypokinetic: basal inferolateral.    All other segments are normal.    Aortic Valve: The aortic valve is trileaflet. The leaflets are severely calcified. There is moderately reduced mobility. There is moderate to severe stenosis. The aortic valve mean gradient is 22.0 mmHg. The dimensionless velocity index is 0.28. The aortic valve area is 0.99 cm2. Mitral Valve: There is mild annular calcification. There is trace regurgitation. Tricuspid Valve: There is mild regurgitation.       Mik Welsh, LOYD RN FNP-BC  Nurse Practitioner  [facility]

## 2023-11-11 VITALS
OXYGEN SATURATION: 88 % | DIASTOLIC BLOOD PRESSURE: 98 MMHG | TEMPERATURE: 97.7 F | HEIGHT: 72 IN | SYSTOLIC BLOOD PRESSURE: 147 MMHG | HEART RATE: 77 BPM | RESPIRATION RATE: 18 BRPM | WEIGHT: 245 LBS | BODY MASS INDEX: 33.18 KG/M2

## 2023-11-11 LAB
ANION GAP SERPL CALCULATED.3IONS-SCNC: 5 MMOL/L
BUN SERPL-MCNC: 38 MG/DL (ref 5–25)
CALCIUM SERPL-MCNC: 8.6 MG/DL (ref 8.4–10.2)
CHLORIDE SERPL-SCNC: 107 MMOL/L (ref 96–108)
CO2 SERPL-SCNC: 25 MMOL/L (ref 21–32)
CREAT SERPL-MCNC: 1.07 MG/DL (ref 0.6–1.3)
DME PARACHUTE DELIVERY DATE REQUESTED: NORMAL
DME PARACHUTE DELIVERY NOTE: NORMAL
DME PARACHUTE ITEM DESCRIPTION: NORMAL
DME PARACHUTE ORDER STATUS: NORMAL
DME PARACHUTE SUPPLIER NAME: NORMAL
DME PARACHUTE SUPPLIER PHONE: NORMAL
ERYTHROCYTE [DISTWIDTH] IN BLOOD BY AUTOMATED COUNT: 13.2 % (ref 11.6–15.1)
GFR SERPL CREATININE-BSD FRML MDRD: 63 ML/MIN/1.73SQ M
GLUCOSE SERPL-MCNC: 151 MG/DL (ref 65–140)
HCT VFR BLD AUTO: 40 % (ref 36.5–49.3)
HGB BLD-MCNC: 12.7 G/DL (ref 12–17)
MCH RBC QN AUTO: 29.5 PG (ref 26.8–34.3)
MCHC RBC AUTO-ENTMCNC: 31.8 G/DL (ref 31.4–37.4)
MCV RBC AUTO: 93 FL (ref 82–98)
PLATELET # BLD AUTO: 277 THOUSANDS/UL (ref 149–390)
PMV BLD AUTO: 9.1 FL (ref 8.9–12.7)
POTASSIUM SERPL-SCNC: 4.8 MMOL/L (ref 3.5–5.3)
RBC # BLD AUTO: 4.31 MILLION/UL (ref 3.88–5.62)
SODIUM SERPL-SCNC: 137 MMOL/L (ref 135–147)
WBC # BLD AUTO: 13.07 THOUSAND/UL (ref 4.31–10.16)

## 2023-11-11 PROCEDURE — 94640 AIRWAY INHALATION TREATMENT: CPT

## 2023-11-11 PROCEDURE — 80048 BASIC METABOLIC PNL TOTAL CA: CPT | Performed by: INTERNAL MEDICINE

## 2023-11-11 PROCEDURE — 94761 N-INVAS EAR/PLS OXIMETRY MLT: CPT

## 2023-11-11 PROCEDURE — 85027 COMPLETE CBC AUTOMATED: CPT | Performed by: INTERNAL MEDICINE

## 2023-11-11 PROCEDURE — 94660 CPAP INITIATION&MGMT: CPT

## 2023-11-11 PROCEDURE — 99232 SBSQ HOSP IP/OBS MODERATE 35: CPT | Performed by: INTERNAL MEDICINE

## 2023-11-11 PROCEDURE — 99239 HOSP IP/OBS DSCHRG MGMT >30: CPT | Performed by: INTERNAL MEDICINE

## 2023-11-11 PROCEDURE — 94760 N-INVAS EAR/PLS OXIMETRY 1: CPT

## 2023-11-11 RX ORDER — ATORVASTATIN CALCIUM 40 MG/1
40 TABLET, FILM COATED ORAL
Qty: 30 TABLET | Refills: 0 | Status: SHIPPED | OUTPATIENT
Start: 2023-11-11

## 2023-11-11 RX ORDER — PREDNISONE 10 MG/1
10 TABLET ORAL DAILY
Qty: 3 TABLET | Refills: 0 | Status: SHIPPED | OUTPATIENT
Start: 2023-11-20

## 2023-11-11 RX ORDER — CARVEDILOL 6.25 MG/1
6.25 TABLET ORAL 2 TIMES DAILY WITH MEALS
Qty: 60 TABLET | Refills: 0 | Status: SHIPPED | OUTPATIENT
Start: 2023-11-11

## 2023-11-11 RX ORDER — PREDNISONE 20 MG/1
40 TABLET ORAL DAILY
Qty: 4 TABLET | Refills: 0 | Status: SHIPPED | OUTPATIENT
Start: 2023-11-12 | End: 2023-11-14

## 2023-11-11 RX ORDER — PREDNISONE 20 MG/1
40 TABLET ORAL DAILY
Status: DISCONTINUED | OUTPATIENT
Start: 2023-11-12 | End: 2023-11-11 | Stop reason: HOSPADM

## 2023-11-11 RX ORDER — PREDNISONE 20 MG/1
20 TABLET ORAL DAILY
Qty: 3 TABLET | Refills: 0 | Status: SHIPPED | OUTPATIENT
Start: 2023-11-17 | End: 2023-11-20

## 2023-11-11 RX ORDER — MONTELUKAST SODIUM 10 MG/1
10 TABLET ORAL
Qty: 30 TABLET | Refills: 0 | Status: SHIPPED | OUTPATIENT
Start: 2023-11-11

## 2023-11-11 RX ORDER — GUAIFENESIN 1200 MG/1
1200 TABLET, EXTENDED RELEASE ORAL 2 TIMES DAILY
Qty: 10 TABLET | Refills: 0 | Status: SHIPPED | OUTPATIENT
Start: 2023-11-11

## 2023-11-11 RX ORDER — PREDNISONE 10 MG/1
30 TABLET ORAL DAILY
Qty: 9 TABLET | Refills: 0 | Status: SHIPPED | OUTPATIENT
Start: 2023-11-14 | End: 2023-11-17

## 2023-11-11 RX ORDER — NITROGLYCERIN 0.4 MG/1
0.4 TABLET SUBLINGUAL
Qty: 15 TABLET | Refills: 0 | Status: SHIPPED | OUTPATIENT
Start: 2023-11-11

## 2023-11-11 RX ORDER — PREDNISONE 20 MG/1
20 TABLET ORAL DAILY
Status: DISCONTINUED | OUTPATIENT
Start: 2023-11-17 | End: 2023-11-11 | Stop reason: HOSPADM

## 2023-11-11 RX ORDER — PREDNISONE 10 MG/1
10 TABLET ORAL DAILY
Status: DISCONTINUED | OUTPATIENT
Start: 2023-11-20 | End: 2023-11-11 | Stop reason: HOSPADM

## 2023-11-11 RX ORDER — IPRATROPIUM BROMIDE AND ALBUTEROL SULFATE 2.5; .5 MG/3ML; MG/3ML
3 SOLUTION RESPIRATORY (INHALATION) 4 TIMES DAILY
Qty: 168 ML | Refills: 0 | Status: SHIPPED | OUTPATIENT
Start: 2023-11-11 | End: 2023-11-25

## 2023-11-11 RX ORDER — PREDNISONE 20 MG/1
40 TABLET ORAL DAILY
Status: DISCONTINUED | OUTPATIENT
Start: 2023-11-11 | End: 2023-11-11

## 2023-11-11 RX ADMIN — DOCUSATE SODIUM 100 MG: 100 CAPSULE, LIQUID FILLED ORAL at 09:25

## 2023-11-11 RX ADMIN — SENNOSIDES 8.6 MG: 8.6 TABLET, FILM COATED ORAL at 09:25

## 2023-11-11 RX ADMIN — IPRATROPIUM BROMIDE 0.5 MG: 0.5 SOLUTION RESPIRATORY (INHALATION) at 13:57

## 2023-11-11 RX ADMIN — PREDNISONE 40 MG: 20 TABLET ORAL at 09:28

## 2023-11-11 RX ADMIN — HEPARIN SODIUM 5000 UNITS: 5000 INJECTION INTRAVENOUS; SUBCUTANEOUS at 14:26

## 2023-11-11 RX ADMIN — GUAIFENESIN 1200 MG: 600 TABLET ORAL at 09:25

## 2023-11-11 RX ADMIN — HEPARIN SODIUM 5000 UNITS: 5000 INJECTION INTRAVENOUS; SUBCUTANEOUS at 05:14

## 2023-11-11 RX ADMIN — FAMOTIDINE 20 MG: 20 TABLET ORAL at 09:25

## 2023-11-11 RX ADMIN — LEVALBUTEROL HYDROCHLORIDE 1.25 MG: 1.25 SOLUTION RESPIRATORY (INHALATION) at 13:57

## 2023-11-11 RX ADMIN — LEVALBUTEROL HYDROCHLORIDE 1.25 MG: 1.25 SOLUTION RESPIRATORY (INHALATION) at 07:20

## 2023-11-11 RX ADMIN — ASPIRIN 81 MG: 81 TABLET, COATED ORAL at 09:25

## 2023-11-11 RX ADMIN — IPRATROPIUM BROMIDE 0.5 MG: 0.5 SOLUTION RESPIRATORY (INHALATION) at 07:20

## 2023-11-11 RX ADMIN — CARVEDILOL 6.25 MG: 3.12 TABLET, FILM COATED ORAL at 09:25

## 2023-11-11 NOTE — DISCHARGE INSTR - AVS FIRST PAGE
Follow-up with your pulmonologist 1 to 2 weeks  Follow-up with cardiology    Take DuoNebs at home 3 times a day for 1 to 2 weeks. After that you can take as needed for shortness of breath. The rest of your medication, take as ordered.

## 2023-11-11 NOTE — RESPIRATORY THERAPY NOTE
Home Oxygen Qualifying Test     Patient name: Sherlyn Buerger        : 1940   Date of Test:  2023  Diagnosis:    Home Oxygen Test:    Medicare Guidelines require item(s) 1-5 on all ambulatory patients or 1 and 2 on non-ambulatory patients. 1. Baseline SPO2 on Room Air at rest 93 %   If <= 88% on Room Air add O2 via NC to obtain SpO2 >=88%. If LPM needed, document LPM  needed to reach =>88%    SPO2 during exertion on Room Air 87  %  During exertion monitor SPO2. If SPO2 increases >=89%, do not add supplemental oxygen    SPO2 on Oxygen at Rest  % at  LPM    SPO2 during exertion on Oxygen 93 % at 2 LPM    Test performed during exertion activity. [x]  Supplemental Home Oxygen is indicated. []  Client does not qualify for home oxygen.     Respiratory Additional Notes-     Jon Brooks, RT

## 2023-11-11 NOTE — ASSESSMENT & PLAN NOTE
With pulmonology as outpatient. On Trelegy Ellipta, albuterol as needed, Singulair, nebulizer as home medication  Patient denied worsening cough. Shortness of breath in the past 5 days. Patient was admitted and treated as for COPD exacerbation with prednisone, antibiotics and nebulizers. Patient's symptoms much improved. Continue prednisone marry and discharge as ordered. Per pulmonology recommendations, continue DuoNebs inhalers 3 times a day for a week or 2 and then as needed. Follow-up with pulmonology outpatient in 2 weeks.   Patient's home nebulizer provided

## 2023-11-11 NOTE — PROGRESS NOTES
Pulmonary Progress Note  Carlitos Mack 80 y.o. male MRN: [id number]  Unit/Bed#: -01 Encounter: [id number]      Assessment/Recommendations:   80 y.o. male with a history of asbestosis pleural plaques, COPD, hypertension, hyperlipidemia who presents with shortness of breath over 4 to 5 days. Assessment/Plan:  Acute hypoxic respiratory failure  Acute tracheobronchitis  Chronic pleural plaques likely due to asbestos exposure  Recent COVID 19 infection  COPD diagnosis, unclear severity  DARIUS on CPAP  Elevated troponin  HFpEF     - Wean supplemental oxygen for SPO2 greater than 88%  - I don't think he has active COVID infection based on lack of CT evidence of COVID pneumonia duration of time since +PCR; positive PCR likely represents residual viral particles  - Xopenex/atrovent TID  - prednisone taper ordered  - agree with azithromycin x 3 days  - No intervention needed for chronic pleural plaques. He has significant asbestos exposure history  - Management of elevated troponin and further diuresis per primary team and cardiology  - home O2 assessment - 2LPM with exertion  - c/w CPAP qhs  - on discharge can continue home Breo. TID-QID DuoNebs or albuterol for 1-2 weeks and then change to PRN. Follow up with his pulmonologist Dr. Julieta Buerger near [place] around 2 weeks after discharge    Subjective: doing better. Still coughing. Dyspnea improving. Weaned off O2 at rest but needs O2 with exertion      Objective:  Vitals: Vitals personally reviewed  Vitals:    11/10/23 1542 11/11/23 0908 11/11/23 1126 11/11/23 1127   BP: 146/81 147/98     BP Location:       Pulse: 85 77     Resp:  18     Temp: 97.6 °F (36.4 °C) 97.7 °F (36.5 °C)     TempSrc:       SpO2: 93% 94% 93% (!) 88%   Weight:       Height:          Body mass index is 33.23 kg/m².     Intake/Output Summary (Last 24 hours) at 11/11/2023 1215  Last data filed at 11/11/2023 0910  Gross per 24 hour   Intake --   Output 975 ml   Net -975 ml     Invasive Devices       Peripheral Intravenous Line  Duration             Peripheral IV 11/08/23 Proximal;Right;Ventral (anterior) Forearm 2 days                    Physical Exam  General: Elderly male, obese, sitting in bed in no acute distress, coughing   HEET: head is normocephalic, atraumatic. EOMI. No scleral icterus. Neck: Supple, no appreciable JVD, not using accessory muscles  CV:  Regular rhythm, +S1 S2  Lungs: Mild expiratory wheezing  Abdomen: Soft, non distended  Extremities: No cyanosis. No peripheral edema  Neuro: No focal deficits  Skin: Warm, dry  Lines/tubes:  Room air      Labs: I have personally reviewed pertinent lab results.   Laboratory and Diagnostics  Results from last 7 days   Lab Units 11/11/23  0350 11/10/23  0142 11/09/23  0521 11/09/23  0027 11/08/23  1538   WBC Thousand/uL 13.07* 16.48* 8.71 6.22 9.24   HEMOGLOBIN g/dL 12.7 13.0 13.8 13.9 13.8   HEMATOCRIT % 40.0 41.6 42.8 42.7 42.8   PLATELETS Thousands/uL 277 319 338 330 311   NEUTROS PCT %  --   --   --   --  77*   MONOS PCT %  --   --   --   --  8   EOS PCT %  --   --   --   --  5     Results from last 7 days   Lab Units 11/11/23  0350 11/10/23  0142 11/09/23  0521 11/08/23  1538   SODIUM mmol/L 137 134* 138 138   POTASSIUM mmol/L 4.8 4.9 4.8 4.4   CHLORIDE mmol/L 107 107 106 106   CO2 mmol/L 25 19* 26 24   ANION GAP mmol/L 5 8 6 8   BUN mg/dL 38* 39* 19 14   CREATININE mg/dL 1.07 1.28 1.01 0.96   CALCIUM mg/dL 8.6 8.7 9.1 9.1   GLUCOSE RANDOM mg/dL 151* 143* 133 115   ALT U/L  --   --   --  21   AST U/L  --   --   --  19   ALK PHOS U/L  --   --   --  83   ALBUMIN g/dL  --   --   --  4.0   TOTAL BILIRUBIN mg/dL  --   --   --  0.62     Results from last 7 days   Lab Units 11/08/23  2228   MAGNESIUM mg/dL 2.0      Results from last 7 days   Lab Units 11/10/23  0843 11/10/23  0142 11/09/23  1720 11/09/23  0650 11/09/23  0027 11/08/23  1538   INR   --   --   --   --  1.11 1.12   PTT seconds 75* 68* 56* 42* 23 29          Results from last 7 days   Lab Units 11/08/23  1538   LACTIC ACID mmol/L 1.2     Results from last 7 days   Lab Units 11/08/23  1538   CRP mg/L 7.9*             Results from last 7 days   Lab Units 11/08/23  1538   D-DIMER QUANTITATIVE ug/ml FEU 1.31*     Results from last 7 days   Lab Units 11/09/23  0521 11/08/23  1538   PROCALCITONIN ng/ml 0.07 0.07     Imaging and other studies: I have personally reviewed pertinent films in PACS  11/8/23 CTA Chest  PULMONARY ARTERIAL TREE: Suboptimal contrast bolus timing for evaluation of the peripheral PE. No central PE.  LUNGS:  Low lung volumes. Mild to moderate emphysema. Bilateral posterior, juxtapleural rounded opacities with radiating bronchi and blood vessels, volume loss, involvement of both the upper and lower lobes and adjacent to pleural thickening/plaques, most compatible with rounded atelectasis. Patent central airways. PLEURA: Bilateral, chronic calcified pleural plaques. HEART/GREAT VESSELS: Atherosclerosis. No acute pathology. MEDIASTINUM AND DEE DEE:  No enlarged lymph nodes. CHEST WALL AND LOWER NECK:   Within normal limits. VISUALIZED STRUCTURES IN THE UPPER ABDOMEN:  Within normal limits. OSSEOUS STRUCTURES: Degenerative changes and chronic, mild midthoracic loss of vertebral body height. No acute pathology. Echocardiogram:   11/9/23    Left Ventricle: Left ventricular cavity size is normal. Wall thickness is moderately increased. There is moderate concentric hypertrophy. The left ventricular ejection fraction is 60%. Systolic function is normal. Diastolic function is mildly abnormal, consistent with grade I (abnormal) relaxation. The following segments are akinetic: basal inferior. The following segments are hypokinetic: basal inferolateral.    All other segments are normal.    Aortic Valve: The aortic valve is trileaflet. The leaflets are severely calcified. There is moderately reduced mobility. There is moderate to severe stenosis.  The aortic valve mean gradient is 22.0 mmHg. The dimensionless velocity index is 0.28. The aortic valve area is 0.99 cm2. Mitral Valve: There is mild annular calcification. There is trace regurgitation. Tricuspid Valve: There is mild regurgitation. Code Status: Level 1 - Full Code      Heather Gao MD  Pulmonary, Critical Care and Sleep Medicine  [address] [facility]     Portions of the record may have been created with voice recognition software. Occasional wrong word or "sound a like" substitutions may have occurred due to the inherent limitations of voice recognition software. Please read the chart carefully and recognize, using context, where substitutions have occurred.

## 2023-11-11 NOTE — ASSESSMENT & PLAN NOTE
Patient reported shortness of breath in the past 5 days. He was seen by outpatient provider was started on levofloxacin and prednisone however he was supposed to get his treatment at the pharmacy today, and he had suddenly worsening shortness of breath. Was transferred via EMS to the hospital.  Per EMS oxygen saturation 88 to 89% but improved with breathing treatments. Received 125 mg Solu-Medrol prehospital.  Chest x-ray showed extensive bilateral calcified pleural plaques, increased opacification of the right medial lung base, which may represent atelectasis or infection/inflammation, increased blunting of the right CP angle, possibly a small pleural effusion versus pleural thickening. Procalcitonin negative x2, no leukocytosis. Tested positive for COVID but he had COVID a month ago  CTA did not showed PE or acute pulmonary pathology, showed emphysema and low lung volume with bilateral rounded atelectasis and pleural changes consistent with asbestosis. Likely COPD exacerbation. Treatment for COPD exacerbation patient's oxygen saturation and respiratory symptoms improved. With ambulation however patient oxygen saturation decreased to 87%. Had home O2 evaluation, patient qualified for home O2 oxygen. Oxygen was provided. Follow-up with pulmonology as outpatient.

## 2023-11-11 NOTE — ASSESSMENT & PLAN NOTE
Lab Results   Component Value Date    HSTNI0 6,753 (H) 11/09/2023    HSTNI2 7,188 (H) 11/09/2023    HSTNID2 435 (H) 11/09/2023    HSTNI4 6,086 (H) 11/09/2023    HSTNID4 -667 11/09/2023      No chest pain  EKG showed sinus tachycardia with nonspecific changes, likely tachycardia related. However patient had echo that showed LVEF 57% with diastolic dysfunction 1, basal inferior wall echinosis, hypokinesis of basal inferior lateral wall; moderate to severe aortic stenosis, mild tricuspid regurgitation. Type II MI in the setting of hypertension, tachycardia secondary to respiratory distress combined with moderate to severe aortic stenosis.    Continue aspirin, carvedilol,  statin  Patient will have cardiology outpatient follow-up for ischemic evaluation also  for the aortic stenosis

## 2023-11-11 NOTE — CASE MANAGEMENT
Case Management Discharge Planning Note    Patient name Elly Posey  Location [address] [facility] [place] 327/-55 MRN [id number]  : 1940 Date 2023       Current Admission Date: 2023  Current Admission Diagnosis:Acute respiratory failure with hypoxia [facility])   Patient Active Problem List    Diagnosis Date Noted    DARIUS (obstructive sleep apnea) 2023    Elevated troponin 2023    Hx of coronary artery disease 2023    Acute respiratory failure with hypoxia ([address]) 2023    History of DVT (deep vein thrombosis) 2023    COVID-19 virus infection 2023    Hyponatremia 2018    ALKSHMI (acute kidney injury) ([address]) 2018    Severe sepsis ([address]) 2018    H/O asbestosis 2018    ASCVD (arteriosclerotic cardiovascular disease) 2018    Essential hypertension 2018    Other hyperlipidemia 2018    COPD (chronic obstructive pulmonary disease) ([address]) 2018      LOS (days): 3  Geometric Mean LOS (GMLOS) (days): 3.60  Days to GMLOS:0.7     OBJECTIVE:  Risk of Unplanned Readmission Score: 13.98         Current admission status: Inpatient   Preferred Pharmacy:   [facility].  [address] [address]  Phone: [phone] Fax: [phone]    Primary Care Provider: ERICA Lundberg    Primary Insurance: Janeth Valenzuela [facility] REP  Secondary Insurance:     DISCHARGE DETAILS:        Received approval from [employer] to deliver nebulizer and O2 tank. CM deliver O2 and nebulizer from closet stock to pt's room and pt signed delivery tickets. Updated [employer] and placed delivery tickets in yellow folder.

## 2023-11-11 NOTE — DISCHARGE SUMMARY
[address]  Discharge- Brook Hodges 1940, 80 y.o. male MRN: [id number]  Unit/Bed#: -Helen Encounter: [id number]  Primary Care Provider: ERICA Blount   Date and time admitted to hospital: 11/8/2023  3:15 PM    * Acute respiratory failure with hypoxia [facility])  Assessment & Plan  Patient reported shortness of breath in the past 5 days. He was seen by outpatient provider was started on levofloxacin and prednisone however he was supposed to get his treatment at the pharmacy today, and he had suddenly worsening shortness of breath. Was transferred via EMS to the hospital.  Per EMS oxygen saturation 88 to 89% but improved with breathing treatments. Received 125 mg Solu-Medrol prehospital.  Chest x-ray showed extensive bilateral calcified pleural plaques, increased opacification of the right medial lung base, which may represent atelectasis or infection/inflammation, increased blunting of the right CP angle, possibly a small pleural effusion versus pleural thickening. Procalcitonin negative x2, no leukocytosis. Tested positive for COVID but he had COVID a month ago  CTA did not showed PE or acute pulmonary pathology, showed emphysema and low lung volume with bilateral rounded atelectasis and pleural changes consistent with asbestosis. Likely COPD exacerbation. Treatment for COPD exacerbation patient's oxygen saturation and respiratory symptoms improved. With ambulation however patient oxygen saturation decreased to 87%. Had home O2 evaluation, patient qualified for home O2 oxygen. Oxygen was provided. Follow-up with pulmonology as outpatient.       Hx of coronary artery disease  Assessment & Plan  MI in 2008, stents on RCA       Elevated troponin  Assessment & Plan  Lab Results   Component Value Date    HSTNI0 6,753 (H) 11/09/2023    HSTNI2 7,188 (H) 11/09/2023    HSTNID2 435 (H) 11/09/2023    HSTNI4 6,086 (H) 11/09/2023    HSTNID4 -667 11/09/2023      No chest pain  EKG showed sinus tachycardia with nonspecific changes, likely tachycardia related. However patient had echo that showed LVEF 51% with diastolic dysfunction 1, basal inferior wall echinosis, hypokinesis of basal inferior lateral wall; moderate to severe aortic stenosis, mild tricuspid regurgitation. Type II MI in the setting of hypertension, tachycardia secondary to respiratory distress combined with moderate to severe aortic stenosis. Continue aspirin, carvedilol,  statin  Patient will have cardiology outpatient follow-up for ischemic evaluation also  for the aortic stenosis      DARIUS (obstructive sleep apnea)  Assessment & Plan  Continue CPAP at bedtime     COVID-19 virus infection  Assessment & Plan  Patient tested positive for COVID on 11/8 however patient reported he had a positive test a month ago or so. Except shortness of breath patient has no other symptoms COVID-related. Given the fact that patient had COVID a month ago unlikely he has reinfection. Monitor. 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 He followed with hematology oncology in 2021  Currently not on any anticoagulation. CTA showed no PE     COPD (chronic obstructive pulmonary disease) (HCC)  Assessment & Plan  With pulmonology as outpatient. On Trelegy Ellipta, albuterol as needed, Singulair, nebulizer as home medication  Patient denied worsening cough. Shortness of breath in the past 5 days. Patient was admitted and treated as for COPD exacerbation with prednisone, antibiotics and nebulizers. Patient's symptoms much improved. Continue prednisone marry and discharge as ordered. Per pulmonology recommendations, continue DuoNebs inhalers 3 times a day for a week or 2 and then as needed. Follow-up with pulmonology outpatient in 2 weeks.   Patient's home nebulizer provided    Essential hypertension  Assessment & Plan  Controlled  Continue carvedilol 6.25 mg twice daily    SIRS (systemic inflammatory response syndrome) (HCC)-resolved as of 11/10/2023  Assessment & Plan  POA with tachycardia and tachypnea likely in the setting of respiratory distress  Does not meet sepsis criteria. Medical Problems       Resolved Problems  Date Reviewed: 8/27/2018            Resolved    SIRS (systemic inflammatory response syndrome) ([address]) 11/10/2023     Resolved by  Clifton Valdivia MD          Admission Date:   Admission Orders (From admission, onward)       Ordered        11/08/23 1627  INPATIENT ADMISSION  Once                            Admitting Diagnosis: Respiratory distress [R06.03]  COPD exacerbation (HCC) [J44.1]  Acute respiratory failure with hypoxia ([address]) [J96.01]  COVID-19 [U07.1]    HPI:   49-year-old male with a past medical history of asbestosis, COPD, hypertension, hyperlipidemia who presented with shortness of breath. Patient had COVID test positive about a month ago. Patient went to the pharmacy to  a prescription for his shortness of breath levofloxacin and steroids however became short of breath. EMS was called patient was noted to be in respiratory distress. Respiratory treatment and Solu-Medrol was initiated, patient had improvement. CT chest on admission showed emphysema and low lung volume with bilateral rounded atelectasis and pleural changes consistent with asbestosis, no PE. Procedures Performed:   Orders Placed This Encounter   Procedures    Critical Care    ED ECG Documentation Only       Summary of Hospital Course:     Presentation patient received IV Lasix for flash pulmonary edema concern. Patient had persistent elevated blood pressure, his troponin were also elevated and cardiology consulted.   Patient was started on heparin drip, echo revealed wall echinosis and moderate to severe aortic stenosis and the patient was diagnosed with type II MI. Cardiology on board, patient was started on carvedilol to better control the blood pressure also on aspirin and statin. Patient will need to follow-up with cardiology as outpatient for ischemic evaluation also discuss about TAVR. For patient's respiratory status, he was admitted as COPD exacerbation and treated with steroids, azithromycin and nebulizers. Patient's respiratory status much improved with treatment. Home O2 eval qualify the patient for home oxygen. Patient was discharged on steroid marry will need to follow-up with his pulmonologist in 2 weeks. Significant Findings, Care, Treatment and Services Provided:   XR chest 1 view portable  Result Date: 11/9/2023  Impression: Findings consistent with asbestos exposure. Limited study unchanged. CTA chest pe study  Result Date: 11/8/2023  Impression: No PE or acute pulmonary pathology identified. Low lung volumes with bilateral rounded atelectasis and findings of prior asbestos exposure. .    XR chest 1 view portable  Result Date: 11/8/2023  Impression: 1. Extensive bilateral calcified pleural plaques. 2. Increased opacification of the right medial lung base, which may represent atelectasis or infection/inflammation. 3. Increased blunting of the right CP angle, possibly a small pleural effusion versus pleural thickening. If clinically warranted, this could be further assessed with CT of the chest.     Complications: none     Condition at Discharge: good         Discharge instructions/Information to patient and family:   See after visit summary for information provided to patient and family. Provisions for Follow-Up Care:  See after visit summary for information related to follow-up care and any pertinent home health orders. PCP: ERICA Pizano    Disposition: Home    Planned Readmission: No    Discharge Statement   I spent 60 minutes discharging the patient. This time was spent on the day of discharge.  I had direct contact with the patient on the day of discharge. Additional documentation is required if more than 30 minutes were spent on discharge. Discharge Medications:  See after visit summary for reconciled discharge medications provided to patient and family.

## 2023-11-11 NOTE — CASE MANAGEMENT
Case Management Discharge Planning Note    Patient name Shawn Osborne  Location /-21 MRN [id number]  : 1940 Date 2023       Current Admission Date: 2023  Current Admission Diagnosis:Acute respiratory failure with hypoxia [facility])   Patient Active Problem List    Diagnosis Date Noted    DARIUS (obstructive sleep apnea) 2023    Elevated troponin 2023    Hx of coronary artery disease 2023    Acute respiratory failure with hypoxia ([address]) 2023    History of DVT (deep vein thrombosis) 2023    COVID-19 virus infection 2023    Hyponatremia 2018    LAKSHMI (acute kidney injury) ([address]) 2018    Severe sepsis ([address]) 2018    H/O asbestosis 2018    ASCVD (arteriosclerotic cardiovascular disease) 2018    Essential hypertension 2018    Other hyperlipidemia 2018    COPD (chronic obstructive pulmonary disease) ([address]) 2018      LOS (days): 3  Geometric Mean LOS (GMLOS) (days): 3.60  Days to GMLOS:0.7     OBJECTIVE:  Risk of Unplanned Readmission Score: 13.98         Current admission status: Inpatient   Preferred Pharmacy:   [facility] [address], [place].  [address] [place] [address]  Phone: [phone] Fax: [phone]    Primary Care Provider: ERICA Leslie    Primary Insurance: [facility]  Secondary Insurance:     DISCHARGE DETAILS:     Pt is in need of a nebulizer and home O2 per respiratory. Orders placed via [place]. Awaiting approval from insurance for delivery.

## 2023-11-13 LAB
BACTERIA BLD CULT: NORMAL
BACTERIA BLD CULT: NORMAL

## 2023-11-13 NOTE — UTILIZATION REVIEW
NOTIFICATION OF ADMISSION DISCHARGE   This is a Notification of Discharge from [address] [facility]. Please be advised that this patient has been discharge from our facility. Below you will find the admission and discharge date and time including the patient’s disposition. UTILIZATION REVIEW CONTACT:  Glenys Tate  Utilization   Network Utilization Review Department  Phone: [phone] carefully listen to the prompts. All voicemails are confidential.  Email: [email or web address]     ADMISSION INFORMATION  PRESENTATION DATE: 11/8/2023  3:15 PM  OBERVATION ADMISSION DATE:   INPATIENT ADMISSION DATE: 11/8/23  4:27 PM   DISCHARGE DATE: 11/11/2023  3:46 PM   DISPOSITION:Home/Self Care    Network Utilization Review Department  ATTENTION: Please call with any questions or concerns to [phone] and carefully listen to the prompts so that you are directed to the right person. All voicemails are confidential.   For Discharge needs, contact Care Management DC Support Team at [phone] opt. [phone]  Send all requests for admission clinical reviews, approved or denied determinations and any other requests to dedicated fax number below belonging to the campus where the patient is receiving treatment.  List of dedicated fax numbers for the Facilities:  [place] DENIALS (Administrative/Medical Necessity) [phone]   DISCHARGE SUPPORT TEAM (Network) [phone] [address] [facility] (Maternity/NICU/Pediatrics) [phone]   [address] [facility] [address] [phone] [address] [facility] [phone]   [address] [place] [address]  [address]  [phone]

## 2023-11-14 LAB

## 2023-11-14 NOTE — CASE MANAGEMENT
Case Management Progress Note    Patient name Arabella Duckworth  Location [address] [facility] [place] 327/-16 MRN [id number]  : 1940 Date 2023       LOS (days): 3  Geometric Mean LOS (GMLOS) (days): 3.60  Days to GMLOS:0.6        OBJECTIVE:        Current admission status: Inpatient  Preferred Pharmacy:   [facility].  [address].  [address] [facility] [address]  Phone: [phone] Fax: [phone]    Primary Care Provider: ERICA Lowery    Primary Insurance: [facility]  Secondary Insurance:     PROGRESS NOTE:    Notification made to OP CM Handoff: [facility] OP CM regarding discharge planning and disposition. Spoke to Chichi Moraes in [employer] office to inform of pt's dc on  to home with home 02 and a nebulizer.

## 2023-11-22 NOTE — PROGRESS NOTES
Patient ID: Namita Alexandra is a 80 y.o. male Date of Birth 1940       Chief Complaint   Patient presents with    Nail Care             Diagnosis:  1. Onychomycosis    2. Loss of protective sensation of skin of foot         Patient presents for at-risk  foot care as patient has complete loss of protective sensation. Patient has no acute concerns today. Patient has significant lower extremity risk due to neuropathy, parasthesia, edema, and trophic skin changes to the lower extremity. On exam patient has thickened, hypertrophic, discolored, brittle toenails with subungual debris and tenderness x10   Callus: none  Patient has +1 pitting  BL lower extremity edema  Patient's skin is atrophic, thickened nails, and decreased pedal hair  Patient has decreased pinprick and vibratory sensation to his feet and parasthesia  Patient does not have any  pedal ulcerations. Pedal pulses are 2 out of 4 bilateral    Today's treatment includes:  Debridement of toenails. Using nail nipper, galileo, and curette, nails were sharply debrided, reduced in thickness and length. Devitalized nail tissue and fungal debris excised and removed. Patient tolerated well. Discussed proper shoe gear, daily inspections of feet, and general foot health with patient. Patient has Q9 findings and is recommended for at risk foot care every 9-10 weeks. Patients most recent complete clinical foot exam was on: 7/6/23     The following portions of the patient's history were reviewed and updated as appropriate: allergies, current medications, past family history, past medical history, past social history, past surgical history, and problem list.        Objective:  /77 (BP Location: Left arm, Patient Position: Sitting, Cuff Size: Adult)   Pulse 69   Ht 6' (1.829 m) Comment: verbal  Wt 111 kg (245 lb)   BMI 33.23 kg/m²       No pertinent results found.       Jovanni Weesk, TONYA, DPM, FACFAS    Portions of the record may have been created with voice recognition software. Occasional wrong word or "sound a like" substitutions may have occurred due to the inherent limitations of voice recognition software. Read the chart carefully and recognize, using context, where substitutions have occurred.

## 2023-11-24 ENCOUNTER — OFFICE VISIT (OUTPATIENT)
Dept: PODIATRY | Facility: CLINIC | Age: 83
End: 2023-11-24
Payer: COMMERCIAL

## 2023-11-24 VITALS
HEART RATE: 69 BPM | DIASTOLIC BLOOD PRESSURE: 77 MMHG | HEIGHT: 72 IN | BODY MASS INDEX: 33.18 KG/M2 | WEIGHT: 245 LBS | SYSTOLIC BLOOD PRESSURE: 146 MMHG

## 2023-11-24 DIAGNOSIS — R20.8 LOSS OF PROTECTIVE SENSATION OF SKIN OF FOOT: ICD-10-CM

## 2023-11-24 DIAGNOSIS — B35.1 ONYCHOMYCOSIS: Primary | ICD-10-CM

## 2023-11-24 PROCEDURE — 11721 DEBRIDE NAIL 6 OR MORE: CPT | Performed by: PODIATRIST

## 2023-12-04 ENCOUNTER — OFFICE VISIT (OUTPATIENT)
Dept: CARDIOLOGY CLINIC | Facility: CLINIC | Age: 83
End: 2023-12-04
Payer: COMMERCIAL

## 2023-12-04 VITALS
BODY MASS INDEX: 33.18 KG/M2 | OXYGEN SATURATION: 100 % | WEIGHT: 245 LBS | HEART RATE: 85 BPM | DIASTOLIC BLOOD PRESSURE: 58 MMHG | HEIGHT: 72 IN | SYSTOLIC BLOOD PRESSURE: 146 MMHG

## 2023-12-04 DIAGNOSIS — R79.89 ELEVATED TROPONIN: ICD-10-CM

## 2023-12-04 PROCEDURE — 99214 OFFICE O/P EST MOD 30 MIN: CPT | Performed by: PHYSICIAN ASSISTANT

## 2023-12-04 NOTE — PROGRESS NOTES
Cardiology Office Follow Up  Monik Archuleta  1940  [id number]      ASSESSMENT:  CAD w/ inferior MI (2008) s/p DREAD x2 to RCA, residual diagonal disease -- managed medically. 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 He does not have clear evidence of CHF at this time. Ideally, he should undergo DEBRA to evaluate his AS further when optimized from a pulmonary standpoint. He is scheduled to see them later this week on 12/6/2023. Continue with Coreg, ASA, statin, zetia at current dose   Avoid strenuous exercise -- stop if experiencing any symptoms    Does not have home BP cuff yet. Advised to get one and record BP daily. RTO with Dr Albert Diamond in 2 months or sooner if needed     Interval History/ HPI:   Monik Archuleta is an 20-year-old male with history of coronary artery disease (w/ prior inferior MI in 2008), hypertension, hyperlipidemia who is known to cardiologist Dr. Albert Diamond who presents for hospital follow-up. Seen at [address] [place] [address] from 11/9 to 11/10/23 where he presented with CC of acute on chronic dyspnea which worsened with exertion. He has attempted to self treat at home with breathing treatments which only helped temporarily. Eventually worsened where he called EMS for transfer to the ED. SpO2 was noted in the 80s and placed on nasal cannula. CT chest showed concerns for interstitial lung disease due to prior asbestos this exposure. He became tachypneic and required transient BiPAP use. He was also notably tachycardic with transient ST depressions. TTE showed preserved EF w/ moderate to severe AS (worsened compared to last TTE). Troponins were noted to be up to 7K which was deemed not on MI related in the setting of acute respiratory distress with underlying CAD and AS.  He was managed medically and discharged on ASA 81 mg, Zetia 10 mg, Lipitor 40 mg and Coreg 6.25 mg with outpatient cardiology follow-up today. Has dyspnea today which is worse then when he was admitted. He has chronic LE edema which is worse at night. Denies any wt gain or orthopnea. He is a side sleeper with no extra pillow use. He did wake up in the middle of the night and was short of breath after walking the bathroom. He took a nebulizer treatment which helped. Denies hot/flushed sensation at night. Denies chest pain, heaviness, pressure including with exertion. No syncope or pre-syncope with exertion. Lives in a bilevel home and independent of his ADLs at baseline. Uses a cane at baseline. Vitals:  146/58  85  100%  245lbs    Past Medical History:   Diagnosis Date    Arthritis     Cardiac disease     heart attck 10 years ago    COPD (chronic obstructive pulmonary disease) ([facility])      Social History     Socioeconomic History    Marital status: Single     Spouse name: Not on file    Number of children: Not on file    Years of education: Not on file    Highest education level: Not on file   Occupational History    Not on file   Tobacco Use    Smoking status: Former     Types: Cigarettes    Smokeless tobacco: Never   Vaping Use    Vaping Use: Never used   Substance and Sexual Activity    Alcohol use: Not Currently     Comment: occasional    Drug use: No    Sexual activity: Not Currently   Other Topics Concern    Not on file   Social History Narrative    Not on file     Social Determinants of Health     Financial Resource Strain: Not on file   Food Insecurity: No Food Insecurity (11/10/2023)    Hunger Vital Sign     Worried About Running Out of Food in the Last Year: Never true     Ran Out of Food in the Last Year: Never true   Transportation Needs: No Transportation Needs (11/10/2023)    PRAPARE - Transportation     Lack of Transportation (Medical): No     Lack of Transportation (Non-Medical):  No   Physical Activity: Not on file   Stress: Not on file   Social Connections: Not on file   Intimate Partner Violence: Not on file   Housing Stability: Low Risk  (11/10/2023)    Housing Stability Vital Sign     Unable to Pay for Housing in the Last Year: No     Number of Places Lived in the Last Year: 1     Unstable Housing in the Last Year: No      Family History   Problem Relation Age of Onset    Colon cancer Neg Hx     Colon polyps Neg Hx      Past Surgical History:   Procedure Laterality Date    CARDIAC SURGERY      stent placement    EYE SURGERY      bilateral cataract surgery    VASCULAR SURGERY      legs       Current Outpatient Medications:     albuterol (ProAir HFA) 90 mcg/act inhaler, 6 (six) times a day, Disp: , Rfl:     aspirin (ECOTRIN LOW STRENGTH) 81 mg EC tablet, Take 1 tablet (81 mg total) by mouth daily Do not start before November 12, 2023., Disp: 30 tablet, Rfl: 0    atorvastatin (LIPITOR) 40 mg tablet, Take 1 tablet (40 mg total) by mouth daily with dinner, Disp: 30 tablet, Rfl: 0    carvedilol (COREG) 6.25 mg tablet, Take 1 tablet (6.25 mg total) by mouth 2 (two) times a day with meals, Disp: 60 tablet, Rfl: 0    ezetimibe (ZETIA) 10 mg tablet, Take 10 mg by mouth 3 (three) times a week., Disp: , Rfl:     guaiFENesin 1200 MG TB12, Take 1 tablet (1,200 mg total) by mouth 2 (two) times a day, Disp: 10 tablet, Rfl: 0    montelukast (SINGULAIR) 10 mg tablet, Take 1 tablet (10 mg total) by mouth daily at bedtime, Disp: 30 tablet, Rfl: 0    nitroglycerin (NITROSTAT) 0.4 mg SL tablet, Place 1 tablet (0.4 mg total) under the tongue every 5 (five) minutes as needed for chest pain, Disp: 15 tablet, Rfl: 0    predniSONE 10 mg tablet, Take 1 tablet (10 mg total) by mouth daily Do not start before November 20, 2023., Disp: 3 tablet, Rfl: 0    Trelegy Ellipta 100-62.5-25 MCG/ACT inhaler, 1 puff daily, Disp: , Rfl:     Review of Systems:  Review of Systems   Constitutional:  Negative for appetite change, chills, diaphoresis, fatigue and fever.    Respiratory:  Positive for shortness of breath. Negative for cough and chest tightness. Cardiovascular:  Positive for leg swelling. Negative for chest pain and palpitations. Gastrointestinal:  Negative for diarrhea, nausea and vomiting. Endocrine: Negative for cold intolerance and heat intolerance. Genitourinary:  Negative for difficulty urinating, dysuria and enuresis. Musculoskeletal:  Negative for arthralgias, back pain and gait problem. Allergic/Immunologic: Negative for environmental allergies and food allergies. Neurological:  Negative for dizziness, facial asymmetry and headaches. Hematological:  Negative for adenopathy. Does not bruise/bleed easily. Psychiatric/Behavioral:  Negative for agitation, behavioral problems and confusion. Physical Exam:  Physical Exam  Constitutional:       Appearance: He is well-developed. HENT:      Right Ear: External ear normal.      Left Ear: External ear normal.   Eyes:      Pupils: Pupils are equal, round, and reactive to light. Cardiovascular:      Rate and Rhythm: Normal rate and regular rhythm. Heart sounds: Murmur heard. No friction rub. No gallop. Pulmonary:      Effort: Pulmonary effort is normal.      Breath sounds: Rhonchi present. No rales. Comments: Course breath sounds  Abdominal:      Palpations: Abdomen is soft. Musculoskeletal:         General: Normal range of motion. Cervical back: Normal range of motion. Right lower leg: Edema present. Left lower leg: Edema present. Skin:     General: Skin is warm and dry. Neurological:      Mental Status: He is alert and oriented to person, place, and time. Deep Tendon Reflexes: Reflexes are normal and symmetric. Psychiatric:         Behavior: Behavior normal.         Thought Content: Thought content normal.         Judgment: Judgment normal.       This note was completed in part utilizing [employer] Direct Software.   Grammatical errors, random word insertions, spelling mistakes, and incomplete sentences can be an occasional consequence of this system secondary to software limitations, ambient noise, and hardware issues. If you have any questions or concerns about the content, text, or information contained within the body of this dictation, please contact the provider for clarification.

## 2023-12-07 ENCOUNTER — CLINICAL SUPPORT (OUTPATIENT)
Dept: PULMONOLOGY | Facility: HOSPITAL | Age: 83
End: 2023-12-07
Attending: INTERNAL MEDICINE
Payer: COMMERCIAL

## 2023-12-07 DIAGNOSIS — J44.1 COPD EXACERBATION (HCC): Primary | ICD-10-CM

## 2023-12-07 PROCEDURE — G0239 OTH RESP PROC, GROUP: HCPCS

## 2023-12-07 NOTE — PROGRESS NOTES
PULMONARY REHAB ASSESSMENT    Today's date: 2023  Patient name: Nj Davila     : 1940       MRN: [id number]  PCP: ERICA Ford  Referring Physician: Ronda Arroyo MD  Pulmonologist: Ty Phelps MD ( [place] [id number] [employer] director    Dx:   Encounter Diagnosis   Name Primary?     COPD exacerbation ([address])          Date of onset: 2023  Cultural needs: none    Weight:    Wt Readings from Last 1 Encounters:   23 111 kg (245 lb)      Height:   Ht Readings from Last 1 Encounters:   23 6' (1.829 m)     Medical History:   Past Medical History:   Diagnosis Date    Arthritis     Cardiac disease     heart attck 10 years ago    COPD (chronic obstructive pulmonary disease) ([facility])          Physical Limitations: walks with cane , no falls     Fall Risk: Low   Comments:  uses cane     Oxygen needs:    Rest:   2lpm as needed   Exercise/physical activity:   2 lpm as needed   Sleep:    2 lpm as needed     Patient has Rx for supplemental O2:  yes    Rating of Perceived Dyspnea at rest:  0/10    Does Pt monitor home SpO2? yes   Average SpO2 at rest:  92%   Average SpO2 with ADLs/physical activity:  92%    History of Toxic Exposure:  Asbestos  Vapors    Use of Rescue Inhaler: yes  uses 2-3 ties per day     Use of Nebulizer Treatments:  yes uses 3-4 times per day     Patient practices breathing techniques at home:  no      Risk Factors   Cholesterol: Yes  Smoking: Former user  1 per month   HTN: No  DM: No  Obesity: Yes   Inactivity: Yes  Stress:  perceived  stress: 4/10   Stressors: medical    Goals for Stress Management: TV    Family History:  Family History   Problem Relation Age of Onset    Colon cancer Neg Hx     Colon polyps Neg Hx        Allergies: Niacin  ETOH:   Social History     Substance and Sexual Activity   Alcohol Use Not Currently    Comment: occasional         Current Medications:   Current Outpatient Medications   Medication Sig Dispense Refill albuterol (ProAir HFA) 90 mcg/act inhaler 6 (six) times a day      aspirin (ECOTRIN LOW STRENGTH) 81 mg EC tablet Take 1 tablet (81 mg total) by mouth daily Do not start before November 12, 2023. 30 tablet 0    atorvastatin (LIPITOR) 40 mg tablet Take 1 tablet (40 mg total) by mouth daily with dinner 30 tablet 0    carvedilol (COREG) 6.25 mg tablet Take 1 tablet (6.25 mg total) by mouth 2 (two) times a day with meals 60 tablet 0    ezetimibe (ZETIA) 10 mg tablet Take 10 mg by mouth 3 (three) times a week. guaiFENesin 1200 MG TB12 Take 1 tablet (1,200 mg total) by mouth 2 (two) times a day 10 tablet 0    montelukast (SINGULAIR) 10 mg tablet Take 1 tablet (10 mg total) by mouth daily at bedtime 30 tablet 0    nitroglycerin (NITROSTAT) 0.4 mg SL tablet Place 1 tablet (0.4 mg total) under the tongue every 5 (five) minutes as needed for chest pain 15 tablet 0    predniSONE 10 mg tablet Take 1 tablet (10 mg total) by mouth daily Do not start before November 20, 2023. (Patient not taking: Reported on 12/4/2023) 3 tablet 0    Trelegy Ellipta 100-62.5-25 MCG/ACT inhaler 1 puff daily       No current facility-administered medications for this visit.            Functional Status Prior to Diagnosis for Treatment   Occupation: retired  Recreation:  green house   ADL’s: No limitations  [place]: No limitations  Exercise: none  Other: none    Current Functional Status  Occupation: retired  Recreation: none  ADL’s:No limitations  [place]: No limitations  Exercise: none  Other: none    Patient Specific Goals:  less SOB, more stamina to house chores / outside work     Short Term Program Goals: dietary modifications increased strength improved energy/stamina with ADLs exercise 120-150 mins/wk reduce work of breathing demonstrate proper PLB technique utilize supplemental O2 as prescribed    Long Term Goals: intial claudication time extended  increased maximal walking duration  increased intial training workload  Improved Hernandez Activity Status score  Improved functional capacity based on initial fitness assessment  improved exercise tolerance  Improved Quality of Life - [facility] score reduced  improved Rate Your Plate Score  decreased shortness of breath   improved SOBQ score  improved CAT score    Oxygen Goals: Maintain SpO2>88% titrating supplemental oxygen as needed     Ability to reach goals/rehabilitation potential:  Very Good     Projected return to function: 12 weeks  Objective tests: 6 MWT      Nutritional   Reviewed details of Rate your Plate. Discussed key elements of heart healthy eating. Reviewed patient goals for dietary modifications and their clinical implications. Reviewed most recent lipid profile.      Goals for dietary modification: choose lean cuts of meat  poultry without the skin  low fat ground meat and poultry  eliminate processed meats  reduce portions of meat to 3 oz  increase fish intake  more meatless meals  low fat dairy   reduced fat cheese  increase whole grains  increase fruits and vegetables  eliminate butter  low sodium  improved snack choices  more nuts/seeds  reduce sweets/frozen desserts  heathier choices while dining out      Emotional/Social  Patient reports they are coping well with good social support and denies depression or anxiety    SOCIAL SUPPORT NETWORK    Marital status:       Domestic Violence Screening: No    Comments: none

## 2023-12-11 ENCOUNTER — CLINICAL SUPPORT (OUTPATIENT)
Dept: PULMONOLOGY | Facility: HOSPITAL | Age: 83
End: 2023-12-11
Attending: INTERNAL MEDICINE
Payer: COMMERCIAL

## 2023-12-11 DIAGNOSIS — J44.1 COPD EXACERBATION (HCC): Primary | ICD-10-CM

## 2023-12-11 PROCEDURE — G0239 OTH RESP PROC, GROUP: HCPCS

## 2023-12-13 ENCOUNTER — CLINICAL SUPPORT (OUTPATIENT)
Dept: PULMONOLOGY | Facility: HOSPITAL | Age: 83
End: 2023-12-13
Attending: INTERNAL MEDICINE
Payer: COMMERCIAL

## 2023-12-13 DIAGNOSIS — J44.1 COPD EXACERBATION (HCC): Primary | ICD-10-CM

## 2023-12-13 DIAGNOSIS — J44.9 CHRONIC OBSTRUCTIVE PULMONARY DISEASE, UNSPECIFIED COPD TYPE (HCC): ICD-10-CM

## 2023-12-13 PROCEDURE — G0239 OTH RESP PROC, GROUP: HCPCS

## 2023-12-18 ENCOUNTER — CLINICAL SUPPORT (OUTPATIENT)
Dept: PULMONOLOGY | Facility: HOSPITAL | Age: 83
End: 2023-12-18
Attending: INTERNAL MEDICINE
Payer: COMMERCIAL

## 2023-12-18 DIAGNOSIS — J44.1 COPD EXACERBATION (HCC): Primary | ICD-10-CM

## 2023-12-18 PROCEDURE — G0239 OTH RESP PROC, GROUP: HCPCS

## 2023-12-20 ENCOUNTER — APPOINTMENT (OUTPATIENT)
Dept: PULMONOLOGY | Facility: HOSPITAL | Age: 83
End: 2023-12-20
Attending: INTERNAL MEDICINE
Payer: COMMERCIAL

## 2023-12-27 ENCOUNTER — CLINICAL SUPPORT (OUTPATIENT)
Dept: PULMONOLOGY | Facility: HOSPITAL | Age: 83
End: 2023-12-27
Attending: INTERNAL MEDICINE
Payer: COMMERCIAL

## 2023-12-27 DIAGNOSIS — J44.1 COPD EXACERBATION (HCC): Primary | ICD-10-CM

## 2023-12-27 PROCEDURE — G0239 OTH RESP PROC, GROUP: HCPCS

## 2024-01-03 ENCOUNTER — CLINICAL SUPPORT (OUTPATIENT)
Dept: PULMONOLOGY | Facility: HOSPITAL | Age: 84
End: 2024-01-03
Attending: INTERNAL MEDICINE
Payer: COMMERCIAL

## 2024-01-03 DIAGNOSIS — J44.9 CHRONIC OBSTRUCTIVE PULMONARY DISEASE, UNSPECIFIED COPD TYPE (HCC): ICD-10-CM

## 2024-01-03 DIAGNOSIS — J44.1 COPD EXACERBATION (HCC): Primary | ICD-10-CM

## 2024-01-03 PROCEDURE — G0239 OTH RESP PROC, GROUP: HCPCS

## 2024-01-03 NOTE — PROGRESS NOTES
Pulmonary Rehabilitation Plan of Care   30 Day Reassessment      Today's date: 1/3/2024   # of Exercise Sessions Completed: 5   Patient name: Austyn Arnold      : 1940  Age: 83 y.o.       MRN: [id number]  Referring Physician: Sepideh Pablo MD  Pulmonologist: Dr Whitley ( [facility])   Will send ITP to Melissa Hardy- Medical director   Provider: Donna  Clinician: Courtney Lizarraga, CRT     Dx:    Encounter Diagnosis   Name Primary?    COPD exacerbation (HCC) Yes     Date of onset: 2023      SUMMARY OF PROGRESS:   Mr Arnold is being evaluated for his 30 today for his initial evaluation for his entry into pulmonary rehab.  To date he has attended 5 exercise sessions in the past 30 days. He is being referred post hospital of COVID as well as COPD exacerbation with a diagnosis of J44.1 exacerbation of COPD. He does not have a PFT on file therefore I can not determine if he is Gold stage or not. He thinks he may have had a test about 10 years of more but does not have a record of it. He has home oxygen that he wears 2 lpm Prn and is getting POC when the DME can provide. He also has a history of hypoxia, acute respiratory failure, DARIUS ( he has CPAP wearing it HS ) sepsis, high cholesterol .,  HX DVT. He had COVID a month ago and was feeling better and ended up in the hospital with an exacerbation of COPD as a result. He is feeling much better. He has a smoking history of about a pack per month x 20 years, quitting back in the . A 6 MWT was done upon entry where he ambulated about 600 feet on room air. Baseline sa02 was 97% with resting HR 68 bpm, /72, SOB at rest 0/10. Lowest recorded saoo2 was 93% with peak HR 92 BPM, /78, SOB 7/10 and RPE 6/10.  He did return back to baseline numbers in 2 minutes post exercise. He did take 2 very short rests due to SOB and some mild discomfort in hip/ side. Mr Arnold's goals for entering the program are to increase stamina so he can do stuff around the  house and  be able to do yardwork in the spring. He also hopes to have less SOB with walking  and in general.  He is starting to see small improvements and is working towards  meeting his goals at this 30 day benchmark. Mr Arnold is exercising about 30-35 minutes per session at a MET level of 2.0.  We are working to increase duration and intensity on equipment as he tolerates.  Mr Arnold has agreed to continue with pulmonary rehab where he will exercise 2 x per week. We will work to increase intensity and duration on all equipment as he tolerates. He has started to attend weekly education classes as well as participate in [employer] therapy. He has taken  it home and practice daily breathing exercises to get better control and help reduce SOB. He has been encouraged to join [facility] Support group held here in the hospital  and has been provided info for the group and added to the mailing list.       Medication compliance: Yes   Comments: Pt reports to be compliant with medications    Fall Risk: Low   Comments:  uses cane     Smoking: Former user    RPD at Rest: 0/10  RPD with Exercise:  7/10    Assessment of progression of lung disease and functional status:  CAT: 24/40  Shortness of breath questionnaire: 27/120      EXERCISE ASSESSMENT and PLAN    Current Exercise Program in Rehab:       Frequency: 2 days/week   Supplement with home exercise 2+ days/wk as tolerated        Minutes: 30         METS: 2.05              SpO2: >88%              RPD: 4-6                      HR: 30 bpm above resting HR    RPE: 4-5         Modalities: Treadmill, UBE, NuStep, Recumbent bike, and Room walking      Exercise Progression 30 Day Goals :    Frequency: 2 days/week    Supplement with home exercise 2+ days/wk as tolerated       Minutes: 35-40         METS: 2.2-2.4              SpO2: >88%              RPD: 4-6                      HR: 30 bpm above resting HR    RPE: 4-5        Modalities: Treadmill, UBE, NuStep, Recumbent bike,  and Room walking     Strength trainin-3 days / week  12-15 repetitions  1-2 sets per modality   Will be added following 2-3 weeks of monitored exercise sessions   Modalities: Lateral Raise, Arm Curl, Sit to Stands, Front Raises, and Shoulder Shrugs    Oxygen needs:   Rest:   2lpm prn   Exercise/physical activity:   2 lpm prn   Sleep:    2 lpm prn and CPAP HS     Oxygen Goal: Maintain SpO2>88% during exercise    Home Exercise: none  Education: pursed lipped breathing, diaphragmatic breathing, fall prevention while using nasal canula tubing, relaxation breathing, home exercise guidelines, benefits of exercise for pulmonary disease, RPE scale, RPD scale, O2 saturation monitoring, appropriate O2 response to exercise, energy conservation, and education class: Exercise For The Pulmonary Patient    Goals: reduced score on  USCD Shortness of Breath Questionnaire, Improved 6MW distance by 10%, reduced dyspnea during exercise , improved exercise tolerance (max METs tolerated in pulmonary rehab), reduced score on CAT, reduced number of COPD exacerbations, reduced RPD at rest, attend pulmonary rehab regularly, decrease sitting time at home, start a walking program, begin a consistent exercise regimen , reduced pulmonary related hospital readmissions , decreased rest needed with physical activity, increased muscular strength, increased energy, increased stamina with ADLs, demonstrate proper pursed lipped breathing, and demonstrate proper diaphragmatic breathing  Progressing:  Reviewed Pt goals and determined plan of care, Patient is working towards meeting his goals to reduce SOB. He is doing pursed lip breathing while in motion , which is helping . He is rating SOB 3-5/10 with exercise in class. He is familiar with the RPE and SOB scales in class. He is able to self monitor POX.     Plan: Titrate supplemental oxygen as needed to maintain SpO2>88% with exercise    Readiness to change: Pre-Contemplation:   (Not yet  acknowledging that there is a problem behavior that needs to change)      NUTRITION ASSESSMENT AND PLAN    Weight control:    Starting weight: 245   Current weight: 245      Diabetes: N/A  A1c: n/a    last measured: n/a  blood sugars 151 in hospital on meds     Goals:Increase water intake to reduce/thin mucus production improved Rate Your Plate score Improve hydration  Education: heart healthy eating  low sodium diet  hydration  education class: Heart Healthy Eating  education class:  Label Reading  education class: healthy eating for managing pulmonary illness  Progressing:Reviewed Pt goals and determined plan of care, Weight is stable, we will be working on nutrition in education class over the next several weeks.  Plan: Education class: Reading Food Labels, Education Class: Heart Healthy Eating, replace refined flours with whole grains, increase daily intake of fruits and vegetables, increase daily intake of low fat dairy, limit meat intake to less than 6oz per day, choose healthy meals while dining out, eat red meat once a week or less, choose lean red meat, reduce intake and /or  rarely eat processed meats , eat poultry without the skin, eat more meatless meals, drink/use 1%  low fat or skim  milk, eat reduced-fat or part-skim cheese or rarely eat, rarely eat frozen desserts, cook without fats or oils, never/rarely eat fried foods, eat healthy snacks like fruit, pretzels, and low fat crackers, and choose desserts such as fruit, faustina food cake, low-fat or fat-free sweets  Readiness to change: Preparation:  (Getting ready to change)       PSYCHOSOCIAL ASSESSMENT AND PLAN    Emotional:  Depression assessment:  PHQ-9 = 3 1-4 = Minimal Depression            Anxiety measure:  MARIZA-7 = 0 0-4  = Not anxious  Self-reported stress level: 3   Social support: Good     Goals:  Physical Fitness in [facility] Score < 3, Social Support in [facility] Score < 3, Overall Health in [facility] Score < 3, Quality of Life in [facility]  Score < 3 , improved concentration, and increased energy  Education: signs/sxs of depression, benefits of a positive support system, stress management techniques, class:  Relaxation, class:  Stress and Your Health , and class:  Stress and Pulmonary Disease    Progressing:Reviewed Pt goals and determined plan of care, Austyn is attending class regularly and is not showing any signs of added depression or anxiety. PHQ9 was rated 3 on 2023.   Plan: Class: Stress and Your Health, Class: Relaxation, Practice relaxation techniques, Exercise, Spend time outdoors, Return to previous social activity, and Avoid triggers  Readiness to change: Preparation:  (Getting ready to change)       OTHER CORE COMPONENTS     Tobacco:   Social History     Tobacco Use   Smoking Status Former    Types: Cigarettes   Smokeless Tobacco Never       Tobacco Use Intervention: Referral to tobacco expert:   N/A: Pt has a remote history of smoking    Blood pressure:    Restin-128/72-74   Exercise: 140/78    Goals: consistent BP < 130/80, reduced dietary sodium <2300mg, and moderate intensity exercise >150 mins/wk  Education:  pathophysiology of pulmonary disease, preventing infections, inspiratory muscle training, environmental triggers, nebulizer use, bronchodilators, bronchial hygiene, traveling with pulmonary disease, harmonica therapy, education: Pulmonary Anatomy and Physiology, education:  Pulmonary Medications, education:  Clearing Secretions, and education: Oxygen  Progressing:Reviewed Pt goals and determined plan of care, BP is within desired range   Plan: Class: Understanding Heart Disease, Class: Common Heart Medications, Complete abstention from smoking, Avoid Processed foods, engage in regular exercise, eliminate salt shaker at the table, use salt substitutes, check labels for sodium content, monitor home BP, class: Pulmonary Anatomy and Physiology, class:  Pulmonary Medications, and home harmonica therapy practice  Readiness to  change: Preparation:  (Getting ready to change)

## 2024-01-08 ENCOUNTER — CLINICAL SUPPORT (OUTPATIENT)
Dept: PULMONOLOGY | Facility: HOSPITAL | Age: 84
End: 2024-01-08
Attending: INTERNAL MEDICINE
Payer: COMMERCIAL

## 2024-01-08 DIAGNOSIS — J44.9 CHRONIC OBSTRUCTIVE PULMONARY DISEASE, UNSPECIFIED COPD TYPE (HCC): ICD-10-CM

## 2024-01-08 DIAGNOSIS — J44.1 COPD EXACERBATION (HCC): Primary | ICD-10-CM

## 2024-01-08 PROCEDURE — G0239 OTH RESP PROC, GROUP: HCPCS

## 2024-01-10 ENCOUNTER — CLINICAL SUPPORT (OUTPATIENT)
Dept: PULMONOLOGY | Facility: HOSPITAL | Age: 84
End: 2024-01-10
Attending: INTERNAL MEDICINE
Payer: COMMERCIAL

## 2024-01-10 DIAGNOSIS — J44.1 COPD EXACERBATION (HCC): Primary | ICD-10-CM

## 2024-01-10 PROCEDURE — G0239 OTH RESP PROC, GROUP: HCPCS

## 2024-01-15 ENCOUNTER — CLINICAL SUPPORT (OUTPATIENT)
Dept: PULMONOLOGY | Facility: HOSPITAL | Age: 84
End: 2024-01-15
Attending: INTERNAL MEDICINE
Payer: COMMERCIAL

## 2024-01-15 DIAGNOSIS — J44.1 COPD EXACERBATION (HCC): Primary | ICD-10-CM

## 2024-01-15 DIAGNOSIS — J44.9 CHRONIC OBSTRUCTIVE PULMONARY DISEASE, UNSPECIFIED COPD TYPE (HCC): ICD-10-CM

## 2024-01-15 PROCEDURE — G0239 OTH RESP PROC, GROUP: HCPCS

## 2024-01-17 ENCOUNTER — APPOINTMENT (OUTPATIENT)
Dept: PULMONOLOGY | Facility: HOSPITAL | Age: 84
End: 2024-01-17
Attending: INTERNAL MEDICINE
Payer: COMMERCIAL

## 2024-01-22 ENCOUNTER — CLINICAL SUPPORT (OUTPATIENT)
Dept: PULMONOLOGY | Facility: HOSPITAL | Age: 84
End: 2024-01-22
Attending: INTERNAL MEDICINE
Payer: COMMERCIAL

## 2024-01-22 DIAGNOSIS — J44.1 COPD EXACERBATION (HCC): Primary | ICD-10-CM

## 2024-01-22 PROCEDURE — G0239 OTH RESP PROC, GROUP: HCPCS

## 2024-01-24 ENCOUNTER — CLINICAL SUPPORT (OUTPATIENT)
Dept: PULMONOLOGY | Facility: HOSPITAL | Age: 84
End: 2024-01-24
Attending: INTERNAL MEDICINE
Payer: COMMERCIAL

## 2024-01-24 DIAGNOSIS — J44.1 COPD EXACERBATION (HCC): Primary | ICD-10-CM

## 2024-01-24 PROCEDURE — G0239 OTH RESP PROC, GROUP: HCPCS

## 2024-01-28 NOTE — PROGRESS NOTES
Cardiology Follow Up    Austyn Arnold  1940  [id number]  [facility]  [address] [place] [id number] [phone]    1. Essential hypertension  Echo complete w/ contrast if indicated      2. ASCVD (arteriosclerotic cardiovascular disease)  Echo complete w/ contrast if indicated      3. Hx of coronary artery disease  Echo complete w/ contrast if indicated      4. History of DVT (deep vein thrombosis)  Echo complete w/ contrast if indicated      5. Nonrheumatic aortic valve stenosis  Echo complete w/ contrast if indicated      6. Other specified peripheral vascular diseases (HCC)          Interval History:  Patient is here for a follow-up visit.  Patient had an IMI in 2008 with recanalization of the vessel at that time and placement of 2 DREAD's. Stress test done November 2013 demonstrated no evidence of prognostically important ischemia. Patient was hospitalized November 2023 with dyspnea related to acute respiratory illness.  CT scan of the chest done November 2023 demonstrated bilateral chronic calcified pleural plaques.  No PE or acute pathology was noted.  Troponin elevation was felt to be non-MI related.  Patient follows with a Pulmonologist Dr. Francis near [place].  Echocardiogram done November 2023 demonstrated LVEF of 60% with moderate LVH.  Inferior WMA was noted.  Moderate to severe AS was noted with a mean gradient of 22 mmHg.  Peak velocity across the AV was 2.9 m/s.  Echo done July 2020 demonstrated a mean gradient of 12 mmHg across the AV with peak velocity of 2.2 m/s noted.  He is not certain about his medication and he will call us about this.  He has had no chest pain or significant dyspnea.    Patient Active Problem List   Diagnosis   • Severe sepsis (HCC)   • H/O asbestosis   • ASCVD (arteriosclerotic cardiovascular disease)   • Essential hypertension   • Other hyperlipidemia   • COPD (chronic  obstructive pulmonary disease) (HCC)   • Hyponatremia   • LAKSHMI (acute kidney injury) (HCC)   • Acute respiratory failure with hypoxia (HCC)   • History of DVT (deep vein thrombosis)   • COVID-19 virus infection   • DARIUS (obstructive sleep apnea)   • Elevated troponin   • Hx of coronary artery disease   • Other specified peripheral vascular diseases (HCC)     Past Medical History:   Diagnosis Date   • Arthritis    • Cardiac disease     heart attck 10 years ago   • COPD (chronic obstructive pulmonary disease) (HCC)      Social History     Socioeconomic History   • Marital status: Single     Spouse name: Not on file   • Number of children: Not on file   • Years of education: Not on file   • Highest education level: Not on file   Occupational History   • Not on file   Tobacco Use   • Smoking status: Former     Types: Cigarettes   • Smokeless tobacco: Never   Vaping Use   • Vaping status: Never Used   Substance and Sexual Activity   • Alcohol use: Not Currently     Comment: occasional   • Drug use: No   • Sexual activity: Not Currently   Other Topics Concern   • Not on file   Social History Narrative   • Not on file     Social Determinants of Health     Financial Resource Strain: Not on file   Food Insecurity: No Food Insecurity (11/10/2023)    Hunger Vital Sign    • Worried About Running Out of Food in the Last Year: Never true    • Ran Out of Food in the Last Year: Never true   Transportation Needs: No Transportation Needs (11/10/2023)    PRAPARE - Transportation    • Lack of Transportation (Medical): No    • Lack of Transportation (Non-Medical): No   Physical Activity: Not on file   Stress: Not on file   Social Connections: Not on file   Intimate Partner Violence: Not on file   Housing Stability: Low Risk  (11/10/2023)    Housing Stability Vital Sign    • Unable to Pay for Housing in the Last Year: No    • Number of Places Lived in the Last Year: 1    • Unstable Housing in the Last Year: No      Family History   Problem  Relation Age of Onset   • Colon cancer Neg Hx    • Colon polyps Neg Hx      Past Surgical History:   Procedure Laterality Date   • CARDIAC SURGERY      stent placement   • EYE SURGERY      bilateral cataract surgery   • VASCULAR SURGERY      legs       Current Outpatient Medications:   •  albuterol (ProAir HFA) 90 mcg/act inhaler, 6 (six) times a day, Disp: , Rfl:   •  atorvastatin (LIPITOR) 40 mg tablet, Take 1 tablet (40 mg total) by mouth daily with dinner, Disp: 30 tablet, Rfl: 0  •  ezetimibe (ZETIA) 10 mg tablet, Take 10 mg by mouth daily, Disp: , Rfl:   •  ipratropium-albuterol (DUO-NEB) 0.5-2.5 mg/3 mL nebulizer solution, Use 1 vial via nebulizer 3 TIMES DAILY AS DIRECTED, Disp: , Rfl:   •  nitroglycerin (NITROSTAT) 0.4 mg SL tablet, Place 1 tablet (0.4 mg total) under the tongue every 5 (five) minutes as needed for chest pain, Disp: 15 tablet, Rfl: 0  •  Trelegy Ellipta 100-62.5-25 MCG/ACT inhaler, 1 puff daily, Disp: , Rfl:   •  aspirin (ECOTRIN LOW STRENGTH) 81 mg EC tablet, Take 1 tablet (81 mg total) by mouth daily Do not start before November 12, 2023. (Patient not taking: Reported on 2/7/2024), Disp: 30 tablet, Rfl: 0  •  carvedilol (COREG) 6.25 mg tablet, Take 1 tablet (6.25 mg total) by mouth 2 (two) times a day with meals, Disp: 60 tablet, Rfl: 0  •  guaiFENesin 1200 MG TB12, Take 1 tablet (1,200 mg total) by mouth 2 (two) times a day, Disp: 10 tablet, Rfl: 0  •  montelukast (SINGULAIR) 10 mg tablet, Take 1 tablet (10 mg total) by mouth daily at bedtime, Disp: 30 tablet, Rfl: 0  Allergies   Allergen Reactions   • Niacin Other (See Comments)       Labs:not applicable  Imaging: No results found.    Review of Systems:  Review of Systems   All other systems reviewed and are negative.      Physical Exam:  /62 (BP Location: Left arm, Patient Position: Sitting, Cuff Size: Standard)   Pulse 60   Ht 6' (1.829 m)   Wt 108 kg (238 lb)   BMI 32.28 kg/m²   Physical Exam  Vitals reviewed.    Constitutional:       Appearance: He is well-developed.   HENT:      Head: Normocephalic and atraumatic.   Cardiovascular:      Rate and Rhythm: Normal rate.      Heart sounds: Murmur heard.   Pulmonary:      Effort: Pulmonary effort is normal.      Breath sounds: Normal breath sounds.   Musculoskeletal:      Cervical back: Normal range of motion.   Skin:     General: Skin is warm and dry.   Neurological:      Mental Status: He is alert and oriented to person, place, and time.         Discussion/Summary:I will continue the patient's current medical regimen.  The patient appears well compensated.  I have asked the patient to call if there is a problem in the interim otherwise I will see the patient in six months time. The patient is due for an echo prior to the next visit to assess LV wall thickness and systolic function.

## 2024-01-29 ENCOUNTER — CLINICAL SUPPORT (OUTPATIENT)
Dept: PULMONOLOGY | Facility: HOSPITAL | Age: 84
End: 2024-01-29
Attending: INTERNAL MEDICINE
Payer: COMMERCIAL

## 2024-01-29 DIAGNOSIS — J44.1 COPD EXACERBATION (HCC): Primary | ICD-10-CM

## 2024-01-29 PROCEDURE — G0239 OTH RESP PROC, GROUP: HCPCS

## 2024-01-29 NOTE — PROGRESS NOTES
Pulmonary Rehabilitation Plan of Care   60 Day Reassessment      Today's date: 2024   # of Exercise Sessions Completed: 11  Patient name: Austyn Arnold      : 1940  Age: 83 y.o.       MRN: [id number]  Referring Physician: Sepideh Pablo MD  Pulmonologist: Dr Whitley ( [facility])   Will send ITP to Melissa Hardy- Medical director   Provider: Donna  Clinician: Courtney Lizarraga, CRT     Dx:    Encounter Diagnosis   Name Primary?    COPD exacerbation (HCC) Yes     Date of onset: 2023      SUMMARY OF PROGRESS:   Mr Arnold is being evaluated for his 60 today for his initial evaluation for his entry into pulmonary rehab.  To date he has attended  11 exercise sessions, 6 exercise sessions in the past 30 days. He is being referred post hospital of COVID as well as COPD exacerbation with a diagnosis of J44.1 exacerbation of COPD. He does not have a PFT on file therefore I can not determine if he is Gold stage or not. He thinks he may have had a test about 10 years of more but does not have a record of it. He has home oxygen that he wears 2 lpm Prn and is getting POC when the DME can provide. He also has a history of hypoxia, acute respiratory failure, DARIUS ( he has CPAP wearing it HS ) sepsis, high cholesterol .,  HX DVT. He had COVID a month ago and was feeling better and ended up in the hospital with an exacerbation of COPD as a result. He is feeling much better. He has a smoking history of about a pack per month x 20 years, quitting back in the . A 6 MWT was done upon entry where he ambulated about 600 feet on room air. Baseline sa02 was 97% with resting HR 68 bpm, /72, SOB at rest 0/10. Lowest recorded saoo2 was 93% with peak HR 92 BPM, /78, SOB 7/10 and RPE 6/10.  He did return back to baseline numbers in 2 minutes post exercise. He did take 2 very short rests due to SOB and some mild discomfort in hip/ side. Mr Arnold's goals for entering the program are to increase stamina so he  can do stuff around the house and  be able to do yardwork in the spring. He also hopes to have less SOB with walking  and in general.  He is starting to see small improvements and is working towards  meeting his goals at this 60 day benchmark. Mr Arnold is exercising about 35 minutes per session at a MET level of 2.1.  Baseline Sa02 on RA in the 94% range with resting /72 ( variable) ,SOB 2-3/10 and resting HR in the 60's. Exercise Sa02  remains above 93% in room air with SOB 4-6/10  and RPE 4-510. We are working to increase duration and intensity on equipment as he tolerates.  Mr Arnold has agreed to continue with pulmonary rehab where he will exercise 2 x per week. We will work to increase intensity and duration on all equipment as he tolerates. He has started to attend weekly education classes as well as participate in [employer] therapy. He has taken  it home and practice daily breathing exercises to get better control and help reduce SOB. He has been encouraged to join [employer] [facility] Support group held here in the hospital  and has been provided info for the group and added to the mailing list.       Medication compliance: Yes   Comments: Pt reports to be compliant with medications    Fall Risk: Low   Comments:  uses cane     Smoking: Former user    RPD at Rest: 0/10  RPD with Exercise:  7/10    Assessment of progression of lung disease and functional status:  CAT: 24/40  Shortness of breath questionnaire: 27/120      EXERCISE ASSESSMENT and PLAN    Current Exercise Program in Rehab:       Frequency: 2 days/week   Supplement with home exercise 2+ days/wk as tolerated        Minutes: 30-35         METS: 2.1               SpO2: >88%              RPD: 4-6                      HR: 30 bpm above resting HR    RPE: 4-5         Modalities: Treadmill, UBE, NuStep, Recumbent bike, and Room walking      Exercise Progression 30 Day Goals :    Frequency: 2 days/week    Supplement with home exercise 2+ days/wk as  tolerated       Minutes: 35-40         METS: 2.2-2.4              SpO2: >88%              RPD: 4-6                      HR: 30 bpm above resting HR    RPE: 4-5        Modalities: Treadmill, UBE, NuStep, Recumbent bike, and Room walking     Strength trainin-3 days / week  12-15 repetitions  1-2 sets per modality   Will be added following 2-3 weeks of monitored exercise sessions   Modalities: Lateral Raise, Arm Curl, Sit to Stands, Front Raises, and Shoulder Shrugs    Oxygen needs:   Rest:   2lpm prn   Exercise/physical activity:   2 lpm prn   Sleep:    2 lpm prn and CPAP HS     Oxygen Goal: Maintain SpO2>88% during exercise    Home Exercise: none  Education: pursed lipped breathing, diaphragmatic breathing, fall prevention while using nasal canula tubing, relaxation breathing, home exercise guidelines, benefits of exercise for pulmonary disease, RPE scale, RPD scale, O2 saturation monitoring, appropriate O2 response to exercise, energy conservation, and education class: Exercise For The Pulmonary Patient    Goals: reduced score on  USCD Shortness of Breath Questionnaire, Improved 6MW distance by 10%, reduced dyspnea during exercise , improved exercise tolerance (max METs tolerated in pulmonary rehab), reduced score on CAT, reduced number of COPD exacerbations, reduced RPD at rest, attend pulmonary rehab regularly, decrease sitting time at home, start a walking program, begin a consistent exercise regimen , reduced pulmonary related hospital readmissions , decreased rest needed with physical activity, increased muscular strength, increased energy, increased stamina with ADLs, demonstrate proper pursed lipped breathing, and demonstrate proper diaphragmatic breathing  Progressing:  Reviewed Pt goals and determined plan of care, Patient is working towards meeting his goals to reduce SOB. He is doing pursed lip breathing while in motion , which is helping . He is rating SOB 3-5/10 with exercise in class. He is  familiar with the RPE and SOB scales in class. He is able to self monitor POX.     Plan: Titrate supplemental oxygen as needed to maintain SpO2>88% with exercise    Readiness to change: Pre-Contemplation:   (Not yet acknowledging that there is a problem behavior that needs to change)      NUTRITION ASSESSMENT AND PLAN    Weight control:    Starting weight: 245   Current weight: 245      Diabetes: N/A  A1c: n/a    last measured: n/a  blood sugars 151 in hospital on meds     Goals:Increase water intake to reduce/thin mucus production improved Rate Your Plate score Improve hydration  Education: heart healthy eating  low sodium diet  hydration  education class: Heart Healthy Eating  education class:  Label Reading  education class: healthy eating for managing pulmonary illness  Progressing:Reviewed Pt goals and determined plan of care, Weight is stable, we will be working on nutrition in education class over the next several weeks.  Plan: Education class: Reading Food Labels, Education Class: Heart Healthy Eating, replace refined flours with whole grains, increase daily intake of fruits and vegetables, increase daily intake of low fat dairy, limit meat intake to less than 6oz per day, choose healthy meals while dining out, eat red meat once a week or less, choose lean red meat, reduce intake and /or  rarely eat processed meats , eat poultry without the skin, eat more meatless meals, drink/use 1%  low fat or skim  milk, eat reduced-fat or part-skim cheese or rarely eat, rarely eat frozen desserts, cook without fats or oils, never/rarely eat fried foods, eat healthy snacks like fruit, pretzels, and low fat crackers, and choose desserts such as fruit, faustina food cake, low-fat or fat-free sweets  Readiness to change: Preparation:  (Getting ready to change)       PSYCHOSOCIAL ASSESSMENT AND PLAN    Emotional:  Depression assessment:  PHQ-9 = 3 1-4 = Minimal Depression            Anxiety measure:  MARIZA-7 = 0 0-4  = Not  anxious  Self-reported stress level: 3   Social support: Good     Goals:  Physical Fitness in [facility] Score < 3, Social Support in [facility] Score < 3, Overall Health in [facility] Score < 3, Quality of Life in [facility] Score < 3 , improved concentration, and increased energy  Education: signs/sxs of depression, benefits of a positive support system, stress management techniques, class:  Relaxation, class:  Stress and Your Health , and class:  Stress and Pulmonary Disease    Progressing:Reviewed Pt goals and determined plan of care, Austyn is attending class regularly and is not showing any signs of added depression or anxiety. PHQ9 was rated 3 on 2023.   Plan: Class: Stress and Your Health, Class: Relaxation, Practice relaxation techniques, Exercise, Spend time outdoors, Return to previous social activity, and Avoid triggers  Readiness to change: Preparation:  (Getting ready to change)       OTHER CORE COMPONENTS     Tobacco:   Social History     Tobacco Use   Smoking Status Former    Types: Cigarettes   Smokeless Tobacco Never       Tobacco Use Intervention: Referral to tobacco expert:   N/A: Pt has a remote history of smoking    Blood pressure:    Restin-128/72-74   Exercise: 140/78    Goals: consistent BP < 130/80, reduced dietary sodium <2300mg, and moderate intensity exercise >150 mins/wk  Education:  pathophysiology of pulmonary disease, preventing infections, inspiratory muscle training, environmental triggers, nebulizer use, bronchodilators, bronchial hygiene, traveling with pulmonary disease, harmonica therapy, education: Pulmonary Anatomy and Physiology, education:  Pulmonary Medications, education:  Clearing Secretions, and education: Oxygen  Progressing:Reviewed Pt goals and determined plan of care, BP is within desired range   Plan: Class: Understanding Heart Disease, Class: Common Heart Medications, Complete abstention from smoking, Avoid Processed foods, engage in regular exercise,  eliminate salt shaker at the table, use salt substitutes, check labels for sodium content, monitor home BP, class: Pulmonary Anatomy and Physiology, class:  Pulmonary Medications, and home harmonica therapy practice  Readiness to change: Preparation:  (Getting ready to change)

## 2024-01-31 ENCOUNTER — CLINICAL SUPPORT (OUTPATIENT)
Dept: PULMONOLOGY | Facility: HOSPITAL | Age: 84
End: 2024-01-31
Attending: INTERNAL MEDICINE
Payer: COMMERCIAL

## 2024-01-31 DIAGNOSIS — J44.1 COPD EXACERBATION (HCC): Primary | ICD-10-CM

## 2024-01-31 PROCEDURE — G0239 OTH RESP PROC, GROUP: HCPCS

## 2024-02-05 ENCOUNTER — CLINICAL SUPPORT (OUTPATIENT)
Dept: PULMONOLOGY | Facility: HOSPITAL | Age: 84
End: 2024-02-05
Attending: INTERNAL MEDICINE
Payer: COMMERCIAL

## 2024-02-05 DIAGNOSIS — J44.9 CHRONIC OBSTRUCTIVE PULMONARY DISEASE, UNSPECIFIED COPD TYPE (HCC): ICD-10-CM

## 2024-02-05 DIAGNOSIS — J44.1 COPD EXACERBATION (HCC): Primary | ICD-10-CM

## 2024-02-05 PROCEDURE — G0239 OTH RESP PROC, GROUP: HCPCS

## 2024-02-07 ENCOUNTER — OFFICE VISIT (OUTPATIENT)
Dept: CARDIOLOGY CLINIC | Facility: CLINIC | Age: 84
End: 2024-02-07
Payer: COMMERCIAL

## 2024-02-07 ENCOUNTER — APPOINTMENT (OUTPATIENT)
Dept: PULMONOLOGY | Facility: HOSPITAL | Age: 84
End: 2024-02-07
Attending: INTERNAL MEDICINE
Payer: COMMERCIAL

## 2024-02-07 VITALS
DIASTOLIC BLOOD PRESSURE: 62 MMHG | SYSTOLIC BLOOD PRESSURE: 136 MMHG | HEART RATE: 60 BPM | HEIGHT: 72 IN | WEIGHT: 238 LBS | BODY MASS INDEX: 32.23 KG/M2

## 2024-02-07 DIAGNOSIS — I35.0 NONRHEUMATIC AORTIC VALVE STENOSIS: ICD-10-CM

## 2024-02-07 DIAGNOSIS — Z86.79 HX OF CORONARY ARTERY DISEASE: ICD-10-CM

## 2024-02-07 DIAGNOSIS — I10 ESSENTIAL HYPERTENSION: Primary | ICD-10-CM

## 2024-02-07 DIAGNOSIS — I25.10 ASCVD (ARTERIOSCLEROTIC CARDIOVASCULAR DISEASE): ICD-10-CM

## 2024-02-07 DIAGNOSIS — Z86.718 HISTORY OF DVT (DEEP VEIN THROMBOSIS): ICD-10-CM

## 2024-02-07 DIAGNOSIS — I73.89 OTHER SPECIFIED PERIPHERAL VASCULAR DISEASES (HCC): ICD-10-CM

## 2024-02-07 PROCEDURE — 99214 OFFICE O/P EST MOD 30 MIN: CPT | Performed by: INTERNAL MEDICINE

## 2024-02-07 RX ORDER — IPRATROPIUM BROMIDE AND ALBUTEROL SULFATE 2.5; .5 MG/3ML; MG/3ML
SOLUTION RESPIRATORY (INHALATION)
COMMUNITY
Start: 2024-01-17

## 2024-02-07 NOTE — PATIENT INSTRUCTIONS
I will continue the patient's current medical regimen.  The patient appears well compensated.  I have asked the patient to call if there is a problem in the interim otherwise I will see the patient in six months time. The patient is due for an echo prior to the next visit to assess LV wall thickness and systolic function.

## 2024-02-08 DIAGNOSIS — R79.89 ELEVATED TROPONIN: ICD-10-CM

## 2024-02-08 DIAGNOSIS — E78.49 OTHER HYPERLIPIDEMIA: ICD-10-CM

## 2024-02-08 DIAGNOSIS — I25.10 ASCVD (ARTERIOSCLEROTIC CARDIOVASCULAR DISEASE): Primary | ICD-10-CM

## 2024-02-08 DIAGNOSIS — Z86.718 HISTORY OF DVT (DEEP VEIN THROMBOSIS): ICD-10-CM

## 2024-02-08 DIAGNOSIS — I10 ESSENTIAL HYPERTENSION: ICD-10-CM

## 2024-02-08 DIAGNOSIS — Z86.79 HX OF CORONARY ARTERY DISEASE: ICD-10-CM

## 2024-02-08 RX ORDER — CARVEDILOL 6.25 MG/1
6.25 TABLET ORAL 2 TIMES DAILY WITH MEALS
Qty: 180 TABLET | Refills: 1 | Status: SHIPPED | OUTPATIENT
Start: 2024-02-08

## 2024-02-08 RX ORDER — EZETIMIBE 10 MG/1
10 TABLET ORAL DAILY
Qty: 90 TABLET | Refills: 1 | Status: SHIPPED | OUTPATIENT
Start: 2024-02-08

## 2024-02-08 RX ORDER — ATORVASTATIN CALCIUM 40 MG/1
40 TABLET, FILM COATED ORAL
Qty: 90 TABLET | Refills: 1 | Status: SHIPPED | OUTPATIENT
Start: 2024-02-08

## 2024-02-08 RX ORDER — NITROGLYCERIN 0.4 MG/1
0.4 TABLET SUBLINGUAL
Qty: 25 TABLET | Refills: 0 | Status: SHIPPED | OUTPATIENT
Start: 2024-02-08

## 2024-02-12 ENCOUNTER — CLINICAL SUPPORT (OUTPATIENT)
Dept: PULMONOLOGY | Facility: HOSPITAL | Age: 84
End: 2024-02-12
Attending: INTERNAL MEDICINE
Payer: COMMERCIAL

## 2024-02-12 DIAGNOSIS — J44.1 COPD EXACERBATION (HCC): Primary | ICD-10-CM

## 2024-02-12 PROCEDURE — G0239 OTH RESP PROC, GROUP: HCPCS

## 2024-02-14 ENCOUNTER — CLINICAL SUPPORT (OUTPATIENT)
Dept: PULMONOLOGY | Facility: HOSPITAL | Age: 84
End: 2024-02-14
Attending: INTERNAL MEDICINE
Payer: COMMERCIAL

## 2024-02-14 DIAGNOSIS — J44.1 COPD EXACERBATION (HCC): Primary | ICD-10-CM

## 2024-02-14 PROCEDURE — G0239 OTH RESP PROC, GROUP: HCPCS

## 2024-02-19 ENCOUNTER — CLINICAL SUPPORT (OUTPATIENT)
Dept: PULMONOLOGY | Facility: HOSPITAL | Age: 84
End: 2024-02-19
Attending: INTERNAL MEDICINE
Payer: COMMERCIAL

## 2024-02-19 DIAGNOSIS — J44.1 COPD EXACERBATION (HCC): Primary | ICD-10-CM

## 2024-02-19 PROCEDURE — G0239 OTH RESP PROC, GROUP: HCPCS

## 2024-02-21 ENCOUNTER — CLINICAL SUPPORT (OUTPATIENT)
Dept: PULMONOLOGY | Facility: HOSPITAL | Age: 84
End: 2024-02-21
Attending: INTERNAL MEDICINE
Payer: COMMERCIAL

## 2024-02-21 DIAGNOSIS — J44.1 COPD EXACERBATION (HCC): Primary | ICD-10-CM

## 2024-02-21 PROBLEM — R65.20 SEVERE SEPSIS (HCC): Status: RESOLVED | Noted: 2018-08-27 | Resolved: 2024-02-21

## 2024-02-21 PROBLEM — A41.9 SEVERE SEPSIS (HCC): Status: RESOLVED | Noted: 2018-08-27 | Resolved: 2024-02-21

## 2024-02-21 PROCEDURE — G0239 OTH RESP PROC, GROUP: HCPCS

## 2024-02-26 ENCOUNTER — CLINICAL SUPPORT (OUTPATIENT)
Dept: PULMONOLOGY | Facility: HOSPITAL | Age: 84
End: 2024-02-26
Attending: INTERNAL MEDICINE
Payer: COMMERCIAL

## 2024-02-26 DIAGNOSIS — J44.1 COPD EXACERBATION (HCC): Primary | ICD-10-CM

## 2024-02-26 DIAGNOSIS — J44.9 CHRONIC OBSTRUCTIVE PULMONARY DISEASE, UNSPECIFIED COPD TYPE (HCC): ICD-10-CM

## 2024-02-26 PROCEDURE — G0239 OTH RESP PROC, GROUP: HCPCS

## 2024-02-26 NOTE — PROGRESS NOTES
Pulmonary Rehabilitation Plan of Care   90 Day Reassessment      Today's date: 2024   # of Exercise Sessions Completed: 19  Patient name: Austyn Arnold      : 1940  Age: 83 y.o.       MRN: [id number]  Referring Physician: Sepideh Pablo MD  Pulmonologist: Dr Whitley ( [facility])   Will send ITP to Melissa Hardy- Medical director   Provider: Donna  Clinician: Tyesha Fan MS, CEP    Dx:    Encounter Diagnoses   Name Primary?    COPD exacerbation (HCC) Yes    Chronic obstructive pulmonary disease, unspecified COPD type (HCC) [J44.9]      Date of onset: 2023      SUMMARY OF PROGRESS:   Mr Arnold is being evaluated for his 89 today for his initial evaluation for his entry into pulmonary rehab.  To date he has attended  19 exercise sessions, 8 exercise sessions in the past 30 days. He is being referred post hospital of COVID as well as COPD exacerbation with a diagnosis of J44.1 exacerbation of COPD. He does not have a PFT on file therefore I can not determine if he is Gold stage or not. He thinks he may have had a test about 10 years of more but does not have a record of it. He has home oxygen that he wears 2 lpm Prn and is getting POC when the DME can provide. He also has a history of hypoxia, acute respiratory failure, DARIUS ( he has CPAP wearing it HS ) sepsis, high cholesterol .,  HX DVT. He had COVID a month ago and was feeling better and ended up in the hospital with an exacerbation of COPD as a result. He is feeling much better. He has a smoking history of about a pack per month x 20 years, quitting back in the . A 6 MWT was done upon entry where he ambulated about 600 feet on room air. Baseline sa02 was 97% with resting HR 68 bpm, /72, SOB at rest 0/10. Lowest recorded sao2 was 93% with peak HR 92 BPM, /78, SOB 7/10 and RPE 6/10.  He did return back to baseline numbers in 2 minutes post exercise. He did take 2 very short rests due to SOB and some mild discomfort in  hip/ side. Mr Arnold's goals for entering the program are to increase stamina so he can do stuff around the house and  be able to do yardwork in the spring. He also hopes to have less SOB with walking  and in general.  He is starting to see small improvements and is working towards  meeting his goals at this 90 day benchmark. Mr Arnold is exercising 30-40 minutes per session at a MET level of 2.1.  Baseline Sa02 on RA in the 94% range with resting /72 ( variable) ,SOB 2-3/10 and resting HR in the 60's. Exercise Sa02  94-96% in room air with SOB 4-6/10  and RPE 4-5/10. We are working to increase duration and intensity on equipment as he tolerates.  Mr Arnold has agreed to continue with pulmonary rehab where he will exercise 2 x per week. We will work to increase intensity and duration on all equipment as he tolerates. He has started to attend weekly education classes as well as participate in [employer] therapy. He has taken  it home and practice daily breathing exercises to get better control and help reduce SOB. He has been encouraged to join [facility] Support group held here in the hospital  and has been provided info for the group and added to the mailing list.       Medication compliance: Yes   Comments: Pt reports to be compliant with medications    Fall Risk: Low   Comments:  uses cane     Smoking: Former user    RPD at Rest: 0/10  RPD with Exercise:  7/10    Assessment of progression of lung disease and functional status:  CAT: 24/40  Shortness of breath questionnaire: 27/120      EXERCISE ASSESSMENT and PLAN    Current Exercise Program in Rehab:       Frequency: 2 days/week   Supplement with home exercise 2+ days/wk as tolerated        Minutes: 30-35         METS: 2.1               SpO2: >88%              RPD: 4-6                      HR: 30 bpm above resting HR    RPE: 4-5         Modalities: Treadmill, UBE, NuStep, Recumbent bike, and Room walking      Exercise Progression 30 Day Goals :     Frequency: 2 days/week    Supplement with home exercise 2+ days/wk as tolerated       Minutes: 35-40         METS: 2.2-2.4              SpO2: >88%              RPD: 4-6                      HR: 30 bpm above resting HR    RPE: 4-5        Modalities: Treadmill, UBE, NuStep, Recumbent bike, and Room walking     Strength trainin-3 days / week  12-15 repetitions  1-2 sets per modality   Will be added following 2-3 weeks of monitored exercise sessions   Modalities: Lateral Raise, Arm Curl, Sit to Stands, Front Raises, and Shoulder Shrugs    Oxygen needs:   Rest:   2lpm prn   Exercise/physical activity:   2 lpm prn   Sleep:    2 lpm prn and CPAP HS     Oxygen Goal: Maintain SpO2>88% during exercise    Home Exercise: none  Education: pursed lipped breathing, diaphragmatic breathing, fall prevention while using nasal canula tubing, relaxation breathing, home exercise guidelines, benefits of exercise for pulmonary disease, RPE scale, RPD scale, O2 saturation monitoring, appropriate O2 response to exercise, energy conservation, and education class: Exercise For The Pulmonary Patient    Goals: reduced score on  USCD Shortness of Breath Questionnaire, Improved 6MW distance by 10%, reduced dyspnea during exercise , improved exercise tolerance (max METs tolerated in pulmonary rehab), reduced score on CAT, reduced number of COPD exacerbations, reduced RPD at rest, attend pulmonary rehab regularly, decrease sitting time at home, start a walking program, begin a consistent exercise regimen , reduced pulmonary related hospital readmissions , decreased rest needed with physical activity, increased muscular strength, increased energy, increased stamina with ADLs, demonstrate proper pursed lipped breathing, and demonstrate proper diaphragmatic breathing  Progressing:  Pt is progressing and showing improvement  toward the following goals:  increased stamina with ADLs, improved exercise tolerance, decreased rest needed with  physical activity.  , Pt has not made progress toward the following goals: start a walking program, begin a consistent exercise regimen. , Will continue to educate and progress as tolerated., Patient is working towards meeting his goals to reduce SOB. He is doing pursed lip breathing while in motion , which is helping . He is rating SOB 2-4/10 with exercise in class. He is familiar with the RPE and SOB scales in class. He is able to self monitor POX.     Plan: Titrate supplemental oxygen as needed to maintain SpO2>88% with exercise    Readiness to change: Action:  (Changing behavior)      NUTRITION ASSESSMENT AND PLAN    Weight control:    Starting weight: 245   Current weight: 236.2      Diabetes: N/A  A1c: n/a    last measured: n/a  blood sugars 151 in hospital on meds     Goals:Increase water intake to reduce/thin mucus production improved Rate Your Plate score Improve hydration  Education: heart healthy eating  low sodium diet  hydration  education class: Heart Healthy Eating  education class:  Label Reading  education class: healthy eating for managing pulmonary illness  Progressing:Pt is progressing and showing improvement  toward the following goals:  improved hydration.  , Will continue to educate and progress as tolerated., Weight is stable, we will be working on nutrition in education class over the next several weeks.  Plan: Education class: Reading Food Labels, Education Class: Heart Healthy Eating, replace refined flours with whole grains, increase daily intake of fruits and vegetables, increase daily intake of low fat dairy, limit meat intake to less than 6oz per day, choose healthy meals while dining out, eat red meat once a week or less, choose lean red meat, reduce intake and /or  rarely eat processed meats , eat poultry without the skin, eat more meatless meals, drink/use 1%  low fat or skim  milk, eat reduced-fat or part-skim cheese or rarely eat, rarely eat frozen desserts, cook without fats or  oils, never/rarely eat fried foods, eat healthy snacks like fruit, pretzels, and low fat crackers, and choose desserts such as fruit, faustina food cake, low-fat or fat-free sweets  Readiness to change: Action:  (Changing behavior)      PSYCHOSOCIAL ASSESSMENT AND PLAN    Emotional:  Depression assessment:  PHQ-9 = 3 1-4 = Minimal Depression            Anxiety measure:  MAIRZA-7 = 0 0-4  = Not anxious  Self-reported stress level: 3   Social support: Good     Goals:  Physical Fitness in [facility] Score < 3, Social Support in [facility] Score < 3, Overall Health in [facility] Score < 3, Quality of Life in [facility] Score < 3 , improved concentration, and increased energy  Education: signs/sxs of depression, benefits of a positive support system, stress management techniques, class:  Relaxation, class:  Stress and Your Health , and class:  Stress and Pulmonary Disease    Progressing:Pt is progressing and showing improvement  toward the following goals:  increased energy.  , Will continue to educate and progress as tolerated., Austyn is attending class regularly and is not showing any signs of added depression or anxiety. PHQ9 was rated 3 on 2023.   Plan: Class: Stress and Your Health, Class: Relaxation, Practice relaxation techniques, Exercise, Spend time outdoors, Return to previous social activity, and Avoid triggers  Readiness to change: Action:  (Changing behavior)      OTHER CORE COMPONENTS     Tobacco:   Social History     Tobacco Use   Smoking Status Former    Types: Cigarettes   Smokeless Tobacco Never       Tobacco Use Intervention: Referral to tobacco expert:   N/A: Pt has a remote history of smoking    Blood pressure:    Restin//82   Exercise: 140/78    Goals: consistent BP < 130/80, reduced dietary sodium <2300mg, and moderate intensity exercise >150 mins/wk  Education:  pathophysiology of pulmonary disease, preventing infections, inspiratory muscle training, environmental triggers, nebulizer use,  bronchodilators, bronchial hygiene, traveling with pulmonary disease, harmonica therapy, education: Pulmonary Anatomy and Physiology, education:  Pulmonary Medications, education:  Clearing Secretions, and education: Oxygen  Progressing:Pt is progressing and showing improvement  toward the following goals:  consistent BP <130/80, reduced dietary sodium <2300 mg.  , Pt has not made progress toward the following goals: moderate intensity exercise >150 min/wk. , Will continue to educate and progress as tolerated.  Plan: Class: Understanding Heart Disease, Class: Common Heart Medications, Complete abstention from smoking, Avoid Processed foods, engage in regular exercise, eliminate salt shaker at the table, use salt substitutes, check labels for sodium content, monitor home BP, class: Pulmonary Anatomy and Physiology, class:  Pulmonary Medications, and home harmonica therapy practice  Readiness to change: Preparation:  (Getting ready to change)  and Action:  (Changing behavior)

## 2024-02-28 ENCOUNTER — CLINICAL SUPPORT (OUTPATIENT)
Dept: PULMONOLOGY | Facility: HOSPITAL | Age: 84
End: 2024-02-28
Attending: INTERNAL MEDICINE
Payer: COMMERCIAL

## 2024-02-28 DIAGNOSIS — J44.1 COPD EXACERBATION (HCC): Primary | ICD-10-CM

## 2024-02-28 DIAGNOSIS — J44.9 CHRONIC OBSTRUCTIVE PULMONARY DISEASE, UNSPECIFIED COPD TYPE (HCC): ICD-10-CM

## 2024-02-28 PROCEDURE — G0239 OTH RESP PROC, GROUP: HCPCS

## 2024-03-04 ENCOUNTER — CLINICAL SUPPORT (OUTPATIENT)
Dept: PULMONOLOGY | Facility: HOSPITAL | Age: 84
End: 2024-03-04
Attending: INTERNAL MEDICINE
Payer: COMMERCIAL

## 2024-03-04 DIAGNOSIS — J44.1 COPD EXACERBATION (HCC): Primary | ICD-10-CM

## 2024-03-04 DIAGNOSIS — J44.9 CHRONIC OBSTRUCTIVE PULMONARY DISEASE, UNSPECIFIED COPD TYPE (HCC): ICD-10-CM

## 2024-03-04 PROCEDURE — G0239 OTH RESP PROC, GROUP: HCPCS

## 2024-03-06 ENCOUNTER — CLINICAL SUPPORT (OUTPATIENT)
Dept: PULMONOLOGY | Facility: HOSPITAL | Age: 84
End: 2024-03-06
Attending: INTERNAL MEDICINE
Payer: COMMERCIAL

## 2024-03-06 DIAGNOSIS — J44.1 COPD EXACERBATION (HCC): Primary | ICD-10-CM

## 2024-03-06 PROCEDURE — G0239 OTH RESP PROC, GROUP: HCPCS

## 2024-03-11 ENCOUNTER — CLINICAL SUPPORT (OUTPATIENT)
Dept: PULMONOLOGY | Facility: HOSPITAL | Age: 84
End: 2024-03-11
Attending: INTERNAL MEDICINE
Payer: COMMERCIAL

## 2024-03-11 DIAGNOSIS — J44.9 CHRONIC OBSTRUCTIVE PULMONARY DISEASE, UNSPECIFIED COPD TYPE (HCC): ICD-10-CM

## 2024-03-11 DIAGNOSIS — J44.1 COPD EXACERBATION (HCC): Primary | ICD-10-CM

## 2024-03-11 PROCEDURE — G0239 OTH RESP PROC, GROUP: HCPCS

## 2024-03-13 ENCOUNTER — CLINICAL SUPPORT (OUTPATIENT)
Dept: PULMONOLOGY | Facility: HOSPITAL | Age: 84
End: 2024-03-13
Attending: INTERNAL MEDICINE
Payer: COMMERCIAL

## 2024-03-13 DIAGNOSIS — J44.1 COPD EXACERBATION (HCC): Primary | ICD-10-CM

## 2024-03-13 PROCEDURE — G0239 OTH RESP PROC, GROUP: HCPCS

## 2024-03-18 ENCOUNTER — CLINICAL SUPPORT (OUTPATIENT)
Dept: PULMONOLOGY | Facility: HOSPITAL | Age: 84
End: 2024-03-18
Attending: INTERNAL MEDICINE
Payer: COMMERCIAL

## 2024-03-18 DIAGNOSIS — J44.1 COPD EXACERBATION (HCC): Primary | ICD-10-CM

## 2024-03-18 DIAGNOSIS — J44.9 CHRONIC OBSTRUCTIVE PULMONARY DISEASE, UNSPECIFIED COPD TYPE (HCC): ICD-10-CM

## 2024-03-18 PROCEDURE — G0239 OTH RESP PROC, GROUP: HCPCS

## 2024-03-19 ENCOUNTER — HOSPITAL ENCOUNTER (OUTPATIENT)
Dept: NON INVASIVE DIAGNOSTICS | Age: 84
Discharge: HOME | End: 2024-03-19
Payer: COMMERCIAL

## 2024-03-19 VITALS
WEIGHT: 238 LBS | HEART RATE: 67 BPM | SYSTOLIC BLOOD PRESSURE: 136 MMHG | HEIGHT: 72 IN | BODY MASS INDEX: 32.23 KG/M2 | DIASTOLIC BLOOD PRESSURE: 62 MMHG

## 2024-03-19 DIAGNOSIS — Z86.718 HISTORY OF DVT (DEEP VEIN THROMBOSIS): ICD-10-CM

## 2024-03-19 DIAGNOSIS — Z86.79 HX OF CORONARY ARTERY DISEASE: ICD-10-CM

## 2024-03-19 DIAGNOSIS — I25.10 ASCVD (ARTERIOSCLEROTIC CARDIOVASCULAR DISEASE): ICD-10-CM

## 2024-03-19 DIAGNOSIS — I35.0 NONRHEUMATIC AORTIC VALVE STENOSIS: ICD-10-CM

## 2024-03-19 DIAGNOSIS — I10 ESSENTIAL HYPERTENSION: ICD-10-CM

## 2024-03-19 LAB
AORTIC ROOT: 3.6 CM
AORTIC VALVE MEAN VELOCITY: 22.7 M/S
APICAL FOUR CHAMBER EJECTION FRACTION: 52 %
ASCENDING AORTA: 3.1 CM
AV AREA BY CONTINUOUS VTI: 1.3 CM2
AV AREA PEAK VELOCITY: 1.2 CM2
AV LVOT MEAN GRADIENT: 2 MMHG
AV LVOT PEAK GRADIENT: 4 MMHG
AV MEAN GRADIENT: 24 MMHG
AV PEAK GRADIENT: 43 MMHG
AV VALVE AREA: 1.26 CM2
AV VELOCITY RATIO: 0.31
AVA (PLAN): 1.2 CM2
BSA FOR ECHO PROCEDURE: 2.29 M2
DOP CALC AO PEAK VEL: 3.27 M/S
DOP CALC AO VTI: 74.25 CM
DOP CALC LVOT AREA: 3.8 CM2
DOP CALC LVOT CARDIAC INDEX: 3.06 L/MIN/M2
DOP CALC LVOT CARDIAC OUTPUT: 7 L/MIN
DOP CALC LVOT DIAMETER: 2.2 CM
DOP CALC LVOT PEAK VEL VTI: 24.7 CM
DOP CALC LVOT PEAK VEL: 1.01 M/S
DOP CALC LVOT STROKE INDEX: 42.4 ML/M2
DOP CALC LVOT STROKE VOLUME: 97
E WAVE DECELERATION TIME: 248 MS
E/A RATIO: 0.91
FRACTIONAL SHORTENING: 37 (ref 28–44)
INTERVENTRICULAR SEPTUM IN DIASTOLE (PARASTERNAL SHORT AXIS VIEW): 1.2 CM
INTERVENTRICULAR SEPTUM: 1.2 CM (ref 0.6–1.1)
LAAS-AP2: 20.4 CM2
LAAS-AP4: 24.5 CM2
LEFT ATRIUM SIZE: 4.2 CM
LEFT ATRIUM VOLUME (MOD BIPLANE): 73 ML
LEFT ATRIUM VOLUME INDEX (MOD BIPLANE): 31.9 ML/M2
LEFT INTERNAL DIMENSION IN SYSTOLE: 3.1 CM (ref 2.1–4)
LEFT VENTRICLE DIASTOLIC VOLUME (MOD BIPLANE): 122 ML
LEFT VENTRICLE DIASTOLIC VOLUME INDEX (MOD BIPLANE): 53.3 ML/M2
LEFT VENTRICLE SYSTOLIC VOLUME (MOD BIPLANE): 52 ML
LEFT VENTRICLE SYSTOLIC VOLUME INDEX (MOD BIPLANE): 22.7 ML/M2
LEFT VENTRICULAR INTERNAL DIMENSION IN DIASTOLE: 4.9 CM (ref 3.5–6)
LEFT VENTRICULAR POSTERIOR WALL IN END DIASTOLE: 1.2 CM
LEFT VENTRICULAR STROKE VOLUME: 74 ML
LV EF: 57 %
LVSV (TEICH): 74 ML
MV E'TISSUE VEL-LAT: 10 CM/S
MV E'TISSUE VEL-SEP: 8 CM/S
MV PEAK A VEL: 1 M/S
MV PEAK E VEL: 91 CM/S
MV STENOSIS PRESSURE HALF TIME: 72 MS
MV VALVE AREA P 1/2 METHOD: 3.1
RIGHT ATRIUM AREA SYSTOLE A4C: 15.1 CM2
RIGHT VENTRICLE ID DIMENSION: 3.7 CM
SL CV LEFT ATRIUM LENGTH A2C: 6.4 CM
SL CV LV EF: 60
SL CV PED ECHO LEFT VENTRICLE DIASTOLIC VOLUME (MOD BIPLANE) 2D: 111 ML
SL CV PED ECHO LEFT VENTRICLE SYSTOLIC VOLUME (MOD BIPLANE) 2D: 38 ML
TR MAX PG: 21 MMHG
TR PEAK VELOCITY: 2.3 M/S
TRICUSPID ANNULAR PLANE SYSTOLIC EXCURSION: 2.1 CM
TRICUSPID VALVE PEAK REGURGITATION VELOCITY: 2.3 M/S

## 2024-03-19 PROCEDURE — 93306 TTE W/DOPPLER COMPLETE: CPT | Performed by: INTERNAL MEDICINE

## 2024-03-19 PROCEDURE — 93306 TTE W/DOPPLER COMPLETE: CPT

## 2024-03-20 ENCOUNTER — APPOINTMENT (OUTPATIENT)
Dept: PULMONOLOGY | Facility: HOSPITAL | Age: 84
End: 2024-03-20
Attending: INTERNAL MEDICINE
Payer: COMMERCIAL

## 2024-03-25 ENCOUNTER — CLINICAL SUPPORT (OUTPATIENT)
Dept: PULMONOLOGY | Facility: HOSPITAL | Age: 84
End: 2024-03-25
Attending: INTERNAL MEDICINE
Payer: COMMERCIAL

## 2024-03-25 DIAGNOSIS — J44.9 CHRONIC OBSTRUCTIVE PULMONARY DISEASE, UNSPECIFIED COPD TYPE (HCC): ICD-10-CM

## 2024-03-25 DIAGNOSIS — J44.1 COPD EXACERBATION (HCC): Primary | ICD-10-CM

## 2024-03-25 PROCEDURE — G0239 OTH RESP PROC, GROUP: HCPCS

## 2024-03-25 NOTE — PROGRESS NOTES
Pulmonary Rehabilitation Plan of Care   90 Day Reassessment      Today's date: 3/25/2024   # of Exercise Sessions Completed: 25  Patient name: Austyn Arnold      : 1940  Age: 83 y.o.       MRN: [id number]  Referring Physician: Sepideh Pablo MD  Pulmonologist: Dr Whitley ( [facility])   Will send ITP to Melissa Hardy- Medical director   Provider: Donna  Clinician: Tyesha Fan MS, CEP    Dx:    Encounter Diagnoses   Name Primary?    COPD exacerbation (HCC) Yes    Chronic obstructive pulmonary disease, unspecified COPD type (HCC) [J44.9]      Date of onset: 2023      SUMMARY OF PROGRESS:   Mr Arnold is being evaluated for his 120 today for his initial evaluation for his entry into pulmonary rehab.  To date he has attended  23 exercise sessions, 4 exercise sessions in the past 30 days. He is being referred post hospital of COVID as well as COPD exacerbation with a diagnosis of J44.1 exacerbation of COPD. He does not have a PFT on file therefore I can not determine if he is Gold stage or not. He thinks he may have had a test about 10 years of more but does not have a record of it. He has home oxygen that he wears 2 lpm Prn and is getting POC when the DME can provide. He also has a history of hypoxia, acute respiratory failure, DARIUS ( he has CPAP wearing it HS ) sepsis, high cholesterol .,  HX DVT. He had COVID a month ago and was feeling better and ended up in the hospital with an exacerbation of COPD as a result. He is feeling much better. He has a smoking history of about a pack per month x 20 years, quitting back in the . A 6 MWT was done upon entry where he ambulated about 600 feet on room air. Baseline sa02 was 97% with resting HR 68 bpm, /72, SOB at rest 0/10. Lowest recorded sao2 was 93% with peak HR 92 BPM, /78, SOB 7/10 and RPE 6/10.  He did return back to baseline numbers in 2 minutes post exercise. He did take 2 very short rests due to SOB and some mild discomfort in  hip/ side. Mr Arnold's goals for entering the program are to increase stamina so he can do stuff around the house and  be able to do yardwork in the spring. He also hopes to have less SOB with walking  and in general.  He is starting to see small improvements and is working towards  meeting his goals at this 90 day benchmark. Mr Arnold is exercising 30-40 minutes per session at a MET level of 2.05-2.8.  Baseline Sa02 on RA in the 94-96% range with resting //72 ( variable) ,SOB 2-3/10 and resting HR in the 60's. Exercise Sa02  93-97% in room air with SOB 3-6/10  and RPE 3-4/10. We are working to increase duration and intensity on equipment as he tolerates.  Mr Arnold has agreed to continue with pulmonary rehab where he will exercise 2 x per week. We will work to increase intensity and duration on all equipment as he tolerates. He has started to attend weekly education classes as well as participate in [employer] therapy. He has taken  it home and practice daily breathing exercises to get better control and help reduce SOB. He has been encouraged to join [employer] Support group held here in the hospital  and has been provided info for the group and added to the mailing list.       Medication compliance: Yes   Comments: Pt reports to be compliant with medications    Fall Risk: Low   Comments:  uses cane     Smoking: Former user    RPD at Rest: 0/10  RPD with Exercise:  3-6/10    Assessment of progression of lung disease and functional status:  CAT: 24/40  Shortness of breath questionnaire: 27/120      EXERCISE ASSESSMENT and PLAN    Current Exercise Program in Rehab:       Frequency: 2 days/week   Supplement with home exercise 2+ days/wk as tolerated        Minutes: 30-35         METS: 2.05-2.8              SpO2: >88%              RPD: 4-6                      HR: 30 bpm above resting HR    RPE: 4-5         Modalities: Treadmill, UBE, NuStep, Recumbent bike, and Room walking      Exercise Progression  30 Day Goals :    Frequency: 2 days/week    Supplement with home exercise 2+ days/wk as tolerated       Minutes: 35-40         METS: 2.5-3.2              SpO2: >88%              RPD: 4-6                      HR: 30 bpm above resting HR    RPE: 4-5        Modalities: Treadmill, UBE, NuStep, Recumbent bike, and Room walking     Strength trainin-3 days / week  12-15 repetitions  1-2 sets per modality   Will be added following 2-3 weeks of monitored exercise sessions   Modalities: Lateral Raise, Arm Curl, Sit to Stands, Front Raises, and Shoulder Shrugs    Oxygen needs:   Rest:   2lpm prn   Exercise/physical activity:   2 lpm prn   Sleep:    2 lpm prn and CPAP HS     Oxygen Goal: Maintain SpO2>88% during exercise    Home Exercise: none  Education: pursed lipped breathing, diaphragmatic breathing, fall prevention while using nasal canula tubing, relaxation breathing, home exercise guidelines, benefits of exercise for pulmonary disease, RPE scale, RPD scale, O2 saturation monitoring, appropriate O2 response to exercise, energy conservation, and education class: Exercise For The Pulmonary Patient    Goals: reduced score on  USCD Shortness of Breath Questionnaire, Improved 6MW distance by 10%, reduced dyspnea during exercise , improved exercise tolerance (max METs tolerated in pulmonary rehab), reduced score on CAT, reduced number of COPD exacerbations, reduced RPD at rest, attend pulmonary rehab regularly, decrease sitting time at home, start a walking program, begin a consistent exercise regimen , reduced pulmonary related hospital readmissions , decreased rest needed with physical activity, increased muscular strength, increased energy, increased stamina with ADLs, demonstrate proper pursed lipped breathing, and demonstrate proper diaphragmatic breathing  Progressing:  Pt is progressing and showing improvement  toward the following goals:  increased stamina with ADLs, improved exercise tolerance, decreased rest  needed with physical activity.  , Pt has not made progress toward the following goals: start a walking program, begin a consistent exercise regimen. , Will continue to educate and progress as tolerated., Patient is working towards meeting his goals to reduce SOB. He is doing pursed lip breathing while in motion , which is helping . He is rating SOB 2-4/10 with exercise in class. He is familiar with the RPE and SOB scales in class. He is able to self monitor POX.     Plan: Titrate supplemental oxygen as needed to maintain SpO2>88% with exercise    Readiness to change: Action:  (Changing behavior)      NUTRITION ASSESSMENT AND PLAN    Weight control:    Starting weight: 245   Current weight: 238.8      Diabetes: N/A  A1c: n/a    last measured: n/a  blood sugars 151 in hospital on meds     Goals:Increase water intake to reduce/thin mucus production improved Rate Your Plate score Improve hydration  Education: heart healthy eating  low sodium diet  hydration  education class: Heart Healthy Eating  education class:  Label Reading  education class: healthy eating for managing pulmonary illness  Progressing:Pt is progressing and showing improvement  toward the following goals:  improved hydration.  , Will continue to educate and progress as tolerated., Weight is stable, we will be working on nutrition in education class over the next several weeks.  Plan: Education class: Reading Food Labels, Education Class: Heart Healthy Eating, replace refined flours with whole grains, increase daily intake of fruits and vegetables, increase daily intake of low fat dairy, limit meat intake to less than 6oz per day, choose healthy meals while dining out, eat red meat once a week or less, choose lean red meat, reduce intake and /or  rarely eat processed meats , eat poultry without the skin, eat more meatless meals, drink/use 1%  low fat or skim  milk, eat reduced-fat or part-skim cheese or rarely eat, rarely eat frozen desserts, cook  without fats or oils, never/rarely eat fried foods, eat healthy snacks like fruit, pretzels, and low fat crackers, and choose desserts such as fruit, faustina food cake, low-fat or fat-free sweets  Readiness to change: Action:  (Changing behavior)      PSYCHOSOCIAL ASSESSMENT AND PLAN    Emotional:  Depression assessment:  PHQ-9 = 3 1-4 = Minimal Depression            Anxiety measure:  MARIZA-7 = 0 0-4  = Not anxious  Self-reported stress level: 3   Social support: Good     Goals:  Physical Fitness in [facility] Score < 3, Social Support in [facility] Score < 3, Overall Health in [facility] Score < 3, Quality of Life in [facility] Score < 3 , improved concentration, and increased energy  Education: signs/sxs of depression, benefits of a positive support system, stress management techniques, class:  Relaxation, class:  Stress and Your Health , and class:  Stress and Pulmonary Disease    Progressing:Pt is progressing and showing improvement  toward the following goals:  increased energy.  , Will continue to educate and progress as tolerated., Austyn is attending class regularly and is not showing any signs of added depression or anxiety. PHQ9 was rated 3 on 2023.   Plan: Class: Stress and Your Health, Class: Relaxation, Practice relaxation techniques, Exercise, Spend time outdoors, Return to previous social activity, and Avoid triggers  Readiness to change: Action:  (Changing behavior)      OTHER CORE COMPONENTS     Tobacco:   Social History     Tobacco Use   Smoking Status Former    Types: Cigarettes   Smokeless Tobacco Never       Tobacco Use Intervention: Referral to tobacco expert:   N/A: Pt has a remote history of smoking    Blood pressure:    Restin//78   Exercise: 140/78    Goals: consistent BP < 130/80, reduced dietary sodium <2300mg, and moderate intensity exercise >150 mins/wk  Education:  pathophysiology of pulmonary disease, preventing infections, inspiratory muscle training, environmental triggers,  nebulizer use, bronchodilators, bronchial hygiene, traveling with pulmonary disease, harmonica therapy, education: Pulmonary Anatomy and Physiology, education:  Pulmonary Medications, education:  Clearing Secretions, and education: Oxygen  Progressing:Pt is progressing and showing improvement  toward the following goals:  consistent BP <130/80, reduced dietary sodium <2300 mg.  , Pt has not made progress toward the following goals: moderate intensity exercise >150 min/wk. , Patient will supplement UT with home walking in the next 30 days, Will continue to educate and progress as tolerated.  Plan: Class: Understanding Heart Disease, Class: Common Heart Medications, Complete abstention from smoking, Avoid Processed foods, engage in regular exercise, eliminate salt shaker at the table, use salt substitutes, check labels for sodium content, monitor home BP, class: Pulmonary Anatomy and Physiology, class:  Pulmonary Medications, and home harmonica therapy practice  Readiness to change: Preparation:  (Getting ready to change)  and Action:  (Changing behavior)

## 2024-03-26 NOTE — PROGRESS NOTES
Patient ID: Austyn Arnold is a 83 y.o. male Date of Birth 1940       Chief Complaint   Patient presents with    FOOT CARE     Onychomycosis f/u             Diagnosis:  1. Onychomycosis    2. Loss of protective sensation of skin of foot         Patient presents for at-risk  foot care as patient has complete loss of protective sensation.  Patient has no acute concerns today.  Patient has significant lower extremity risk due to neuropathy, parasthesia, edema, and trophic skin changes to the lower extremity.    On exam patient has thickened, hypertrophic, discolored, brittle toenails with subungual debris and tenderness x10   Callus: none  Patient has +1 pitting  BL lower extremity edema  Patient's skin is atrophic, thickened nails, and decreased pedal hair  Patient has decreased pinprick and vibratory sensation to his feet and parasthesia  Patient does not have any  pedal ulcerations.  Pedal pulses are 2 out of 4 bilateral    Today's treatment includes:  Debridement of toenails. Using nail nipper, galileo, and curette, nails were sharply debrided, reduced in thickness and length. Devitalized nail tissue and fungal debris excised and removed. Patient tolerated well.      Discussed proper shoe gear, daily inspections of feet, and general foot health with patient. Patient has Q9 findings and is recommended for at risk foot care every  4-5 months.    Patients most recent complete clinical foot exam was on: 7/6/23     The following portions of the patient's history were reviewed and updated as appropriate: allergies, current medications, past family history, past medical history, past social history, past surgical history, and problem list.        Objective:  /72 (BP Location: Right arm, Patient Position: Sitting, Cuff Size: Adult)   Pulse 73   Ht 6' (1.829 m)   Wt 110 kg (242 lb)   BMI 32.82 kg/m²       No pertinent results found.      Tri Ramirez, TONYA, DPM, FACFAS    Portions of the record may have been  "created with voice recognition software. Occasional wrong word or \"sound a like\" substitutions may have occurred due to the inherent limitations of voice recognition software. Read the chart carefully and recognize, using context, where substitutions have occurred.  "

## 2024-03-27 ENCOUNTER — CLINICAL SUPPORT (OUTPATIENT)
Dept: PULMONOLOGY | Facility: HOSPITAL | Age: 84
End: 2024-03-27
Attending: INTERNAL MEDICINE
Payer: COMMERCIAL

## 2024-03-27 DIAGNOSIS — J44.1 COPD EXACERBATION (HCC): Primary | ICD-10-CM

## 2024-03-27 PROCEDURE — G0239 OTH RESP PROC, GROUP: HCPCS

## 2024-03-29 ENCOUNTER — OFFICE VISIT (OUTPATIENT)
Dept: PODIATRY | Facility: CLINIC | Age: 84
End: 2024-03-29
Payer: COMMERCIAL

## 2024-03-29 ENCOUNTER — TELEPHONE (OUTPATIENT)
Dept: CARDIOLOGY CLINIC | Facility: CLINIC | Age: 84
End: 2024-03-29

## 2024-03-29 VITALS
SYSTOLIC BLOOD PRESSURE: 151 MMHG | HEIGHT: 72 IN | WEIGHT: 242 LBS | BODY MASS INDEX: 32.78 KG/M2 | DIASTOLIC BLOOD PRESSURE: 72 MMHG | HEART RATE: 73 BPM

## 2024-03-29 DIAGNOSIS — B35.1 ONYCHOMYCOSIS: Primary | ICD-10-CM

## 2024-03-29 DIAGNOSIS — R20.8 LOSS OF PROTECTIVE SENSATION OF SKIN OF FOOT: ICD-10-CM

## 2024-03-29 PROCEDURE — 11721 DEBRIDE NAIL 6 OR MORE: CPT | Performed by: PODIATRIST

## 2024-03-29 NOTE — TELEPHONE ENCOUNTER
Called, spoke to pharmacy and pt.    Per pt, he thinks he has been taking both metoprolol and carvedilol recently. He denies BP or HR issues.    Will reach out to PCP office on Monday re: medications.

## 2024-03-29 NOTE — TELEPHONE ENCOUNTER
Call from pharmacist -     Pt came to refill medications. Brought in med bottles for metoprolol with PCP name on.    Also brought in bottle of furosemide which was an older rx from PCP.    Verified our medication list w/ pharmacist.  Asked pharmacist to d/c PCP's metoprolol rx as pt is already prescribed carvedilol by cardiology.  Asked pharmacist to hold furosemide refill until we speak to cardiologist as we are unaware pt may have been taking furosemide.    Please advise, thank you.

## 2024-04-01 ENCOUNTER — CLINICAL SUPPORT (OUTPATIENT)
Dept: PULMONOLOGY | Facility: HOSPITAL | Age: 84
End: 2024-04-01
Attending: INTERNAL MEDICINE
Payer: COMMERCIAL

## 2024-04-01 DIAGNOSIS — J44.9 CHRONIC OBSTRUCTIVE PULMONARY DISEASE, UNSPECIFIED COPD TYPE (HCC): ICD-10-CM

## 2024-04-01 DIAGNOSIS — J44.1 COPD EXACERBATION (HCC): Primary | ICD-10-CM

## 2024-04-01 PROCEDURE — G0239 OTH RESP PROC, GROUP: HCPCS

## 2024-04-03 ENCOUNTER — CLINICAL SUPPORT (OUTPATIENT)
Dept: PULMONOLOGY | Facility: HOSPITAL | Age: 84
End: 2024-04-03
Attending: INTERNAL MEDICINE
Payer: COMMERCIAL

## 2024-04-03 DIAGNOSIS — J44.9 CHRONIC OBSTRUCTIVE PULMONARY DISEASE, UNSPECIFIED COPD TYPE (HCC): ICD-10-CM

## 2024-04-03 DIAGNOSIS — J44.1 COPD EXACERBATION (HCC): Primary | ICD-10-CM

## 2024-04-03 PROCEDURE — G0239 OTH RESP PROC, GROUP: HCPCS

## 2024-04-04 NOTE — TELEPHONE ENCOUNTER
Called - LM on VM for Tri, care coordinator at [facility] to advise her of medication concerns, possible polypharmacy, pt confusion regarding medications etc. Will fax telephone encounter and medication list to PCP.

## 2024-04-08 ENCOUNTER — APPOINTMENT (OUTPATIENT)
Dept: PULMONOLOGY | Facility: HOSPITAL | Age: 84
End: 2024-04-08
Attending: INTERNAL MEDICINE
Payer: COMMERCIAL

## 2024-04-10 ENCOUNTER — CLINICAL SUPPORT (OUTPATIENT)
Dept: PULMONOLOGY | Facility: HOSPITAL | Age: 84
End: 2024-04-10
Attending: INTERNAL MEDICINE
Payer: COMMERCIAL

## 2024-04-10 DIAGNOSIS — J44.9 CHRONIC OBSTRUCTIVE PULMONARY DISEASE, UNSPECIFIED COPD TYPE (HCC): ICD-10-CM

## 2024-04-10 DIAGNOSIS — J44.1 COPD EXACERBATION (HCC): Primary | ICD-10-CM

## 2024-04-10 PROCEDURE — G0239 OTH RESP PROC, GROUP: HCPCS

## 2024-04-15 ENCOUNTER — CLINICAL SUPPORT (OUTPATIENT)
Dept: PULMONOLOGY | Facility: HOSPITAL | Age: 84
End: 2024-04-15
Attending: INTERNAL MEDICINE
Payer: COMMERCIAL

## 2024-04-15 DIAGNOSIS — J44.9 CHRONIC OBSTRUCTIVE PULMONARY DISEASE, UNSPECIFIED COPD TYPE (HCC): ICD-10-CM

## 2024-04-15 DIAGNOSIS — J44.1 COPD EXACERBATION (HCC): Primary | ICD-10-CM

## 2024-04-15 PROCEDURE — G0239 OTH RESP PROC, GROUP: HCPCS

## 2024-04-17 ENCOUNTER — CLINICAL SUPPORT (OUTPATIENT)
Dept: PULMONOLOGY | Facility: HOSPITAL | Age: 84
End: 2024-04-17
Attending: INTERNAL MEDICINE
Payer: COMMERCIAL

## 2024-04-17 DIAGNOSIS — J44.9 CHRONIC OBSTRUCTIVE PULMONARY DISEASE, UNSPECIFIED COPD TYPE (HCC): ICD-10-CM

## 2024-04-17 DIAGNOSIS — J44.1 COPD EXACERBATION (HCC): Primary | ICD-10-CM

## 2024-04-17 PROCEDURE — G0239 OTH RESP PROC, GROUP: HCPCS

## 2024-04-22 ENCOUNTER — CLINICAL SUPPORT (OUTPATIENT)
Dept: PULMONOLOGY | Facility: HOSPITAL | Age: 84
End: 2024-04-22
Attending: INTERNAL MEDICINE
Payer: COMMERCIAL

## 2024-04-22 DIAGNOSIS — J44.1 COPD EXACERBATION (HCC): Primary | ICD-10-CM

## 2024-04-22 PROCEDURE — G0239 OTH RESP PROC, GROUP: HCPCS

## 2024-04-24 ENCOUNTER — CLINICAL SUPPORT (OUTPATIENT)
Dept: PULMONOLOGY | Facility: HOSPITAL | Age: 84
End: 2024-04-24
Attending: INTERNAL MEDICINE
Payer: COMMERCIAL

## 2024-04-24 DIAGNOSIS — J44.1 COPD EXACERBATION (HCC): Primary | ICD-10-CM

## 2024-04-24 PROCEDURE — G0239 OTH RESP PROC, GROUP: HCPCS

## 2024-04-29 ENCOUNTER — CLINICAL SUPPORT (OUTPATIENT)
Dept: PULMONOLOGY | Facility: HOSPITAL | Age: 84
End: 2024-04-29
Attending: INTERNAL MEDICINE
Payer: COMMERCIAL

## 2024-04-29 DIAGNOSIS — J44.1 COPD EXACERBATION (HCC): Primary | ICD-10-CM

## 2024-04-29 DIAGNOSIS — J44.9 CHRONIC OBSTRUCTIVE PULMONARY DISEASE, UNSPECIFIED COPD TYPE (HCC): ICD-10-CM

## 2024-04-29 PROCEDURE — G0239 OTH RESP PROC, GROUP: HCPCS

## 2024-04-29 NOTE — PROGRESS NOTES
Exercise Session Details   Spinal Block    End time: 5/31/2022 8:03 AM  Reason for block: primary anesthetic  Staffing  Anesthesiologist: Uday Elkins MD  Other anesthesia staff: Penny Miller RN  Spinal Block  Patient position: sitting  Prep: Betadine  Patient monitoring: continuous pulse ox and frequent blood pressure checks  Approach: midline  Location: L2/L3  Provider prep: mask and sterile gloves  Local infiltration: lidocaine  Preanesthetic Checklist  Completed: patient identified, IV checked, site marked, risks and benefits discussed, surgical/procedural consents, equipment checked, pre-op evaluation, timeout performed, anesthesia consent given, oxygen available, monitors applied/VS acknowledged, fire risk safety assessment completed and verbalized and blood product R/B/A discussed and consented

## 2024-05-01 ENCOUNTER — CLINICAL SUPPORT (OUTPATIENT)
Dept: PULMONOLOGY | Facility: HOSPITAL | Age: 84
End: 2024-05-01
Attending: INTERNAL MEDICINE
Payer: COMMERCIAL

## 2024-05-01 DIAGNOSIS — J43.9 PULMONARY EMPHYSEMA, UNSPECIFIED EMPHYSEMA TYPE (HCC): Primary | ICD-10-CM

## 2024-05-01 PROCEDURE — G0239 OTH RESP PROC, GROUP: HCPCS

## 2024-05-01 NOTE — PROGRESS NOTES
Pulmonary Rehabilitation Plan of Care   DISCHARGE        Today's date: 2024   # of Exercise Sessions Completed: 36  Patient name: Austyn Arnold      : 1940  Age: 83 y.o.       MRN: [id number]  Referring Physician: Sepideh Pablo MD  Pulmonologist: Dr Whitley ( [facility])   Will send ITP to Melissa Hardy- Medical director   Provider: Donna  Clinician: Trish Hathaway MS, CEP      Dx:    COPD    Date of onset: 2023      SUMMARY OF PROGRESS:   Mr Arnold has completed his pulmonary rehab program at 36 sessions meeting personal and department goals    Medication compliance: Yes   Comments: Pt reports to be compliant with medications    Fall Risk: Low   Comments:  uses cane     Smoking: Former user    RPD at Rest: 0/10  RPD with Exercise:  3-6/10    Assessment of progression of lung disease and functional status:  CAT: 24  Shortness of breath questionnaire: 27120      EXERCISE ASSESSMENT and PLAN    Current Exercise Program in Rehab:       Frequency: 2 days/week   Supplement with home exercise 2+ days/wk as tolerated        Minutes: 30-35         METS: 2.05-2.8              SpO2: >88%              RPD: 4-6                      HR: 30 bpm above resting HR    RPE: 4-5         Modalities: Treadmill, UBE, NuStep, Recumbent bike, and Room walking      Exercise Progression 30 Day Goals :    Frequency: 2 days/week    Supplement with home exercise 2+ days/wk as tolerated       Minutes: 35-40         METS: 2.5-3.2              SpO2: >88%              RPD: 4-6                      HR: 30 bpm above resting HR    RPE: 4-5        Modalities: Treadmill, UBE, NuStep, Recumbent bike, and Room walking     Strength trainin-3 days / week  12-15 repetitions  1-2 sets per modality   Will be added following 2-3 weeks of monitored exercise sessions   Modalities: Lateral Raise, Arm Curl, Sit to Stands, Front Raises, and Shoulder Shrugs    Oxygen needs:   Rest:   2lpm prn   Exercise/physical activity:   2 lpm  prn   Sleep:    2 lpm prn and CPAP HS     Oxygen Goal: Maintain SpO2>88% during exercise    Home Exercise: none  Education: pursed lipped breathing, diaphragmatic breathing, fall prevention while using nasal canula tubing, relaxation breathing, home exercise guidelines, benefits of exercise for pulmonary disease, RPE scale, RPD scale, O2 saturation monitoring, appropriate O2 response to exercise, energy conservation, and education class: Exercise For The Pulmonary Patient    Goals: reduced score on  USCD Shortness of Breath Questionnaire, Improved 6MW distance by 10%, reduced dyspnea during exercise , improved exercise tolerance (max METs tolerated in pulmonary rehab), reduced score on CAT, reduced number of COPD exacerbations, reduced RPD at rest, attend pulmonary rehab regularly, decrease sitting time at home, start a walking program, begin a consistent exercise regimen , reduced pulmonary related hospital readmissions , decreased rest needed with physical activity, increased muscular strength, increased energy, increased stamina with ADLs, demonstrate proper pursed lipped breathing, and demonstrate proper diaphragmatic breathing  Progressing:  Goals met: increased stamina with ADLs, improved exercise tolerance, decreased rest needed with physical activity. Goals not met: does not have plan for home exercise. Reports he is too busy right now working in his garden and mowing.      Plan: Titrate supplemental oxygen as needed to maintain SpO2>88% with exercise    Readiness to change: Action:  (Changing behavior)      NUTRITION ASSESSMENT AND PLAN    Weight control:    Starting weight: 245 lb   Current weight: 238.8  lb    Diabetes: N/A  A1c: n/a    last measured: n/a  blood sugars 151 in hospital on meds     Goals:Increase water intake to reduce/thin mucus production improved Rate Your Plate score Improve hydration  Education: heart healthy eating  low sodium diet  hydration  education class: Heart Healthy  Eating  education class:  Label Reading  education class: healthy eating for managing pulmonary illness  Progressing:Pt is progressing and showing improvement  toward the following goals:  improved hydration.  , Will continue to educate and progress as tolerated., Weight is stable, we will be working on nutrition in education class over the next several weeks.  Plan: Education class: Reading Food Labels, Education Class: Heart Healthy Eating, replace refined flours with whole grains, increase daily intake of fruits and vegetables, increase daily intake of low fat dairy, limit meat intake to less than 6oz per day, choose healthy meals while dining out, eat red meat once a week or less, choose lean red meat, reduce intake and /or  rarely eat processed meats , eat poultry without the skin, eat more meatless meals, drink/use 1%  low fat or skim  milk, eat reduced-fat or part-skim cheese or rarely eat, rarely eat frozen desserts, cook without fats or oils, never/rarely eat fried foods, eat healthy snacks like fruit, pretzels, and low fat crackers, and choose desserts such as fruit, faustina food cake, low-fat or fat-free sweets  Readiness to change: Action:  (Changing behavior)      PSYCHOSOCIAL ASSESSMENT AND PLAN    Emotional:  Depression assessment:  PHQ-9 = 3 1-4 = Minimal Depression            Anxiety measure:  MARIZA-7 = 0 0-4  = Not anxious  Self-reported stress level: 3   Social support: Good     Goals:  Physical Fitness in [facility] Score < 3, Social Support in [facility] Score < 3, Overall Health in [facility] Score < 3, Quality of Life in [facility] Score < 3 , improved concentration, and increased energy  Education: signs/sxs of depression, benefits of a positive support system, stress management techniques, class:  Relaxation, class:  Stress and Your Health , and class:  Stress and Pulmonary Disease    Progressing: Goals met: Austyn is attending class regularly and is not showing any signs of added depression or  anxiety. PHQ9 was rated 3 on 2023.    Plan: Class: Stress and Your Health, Class: Relaxation, Practice relaxation techniques, Exercise, Spend time outdoors, Return to previous social activity, and Avoid triggers  Readiness to change: Action:  (Changing behavior)      OTHER CORE COMPONENTS     Tobacco:   Social History     Tobacco Use   Smoking Status Former    Types: Cigarettes   Smokeless Tobacco Never       Tobacco Use Intervention: Referral to tobacco expert:   N/A: Pt has a remote history of smoking    Blood pressure:    Restin//78   Exercise: 140/78    Goals: consistent BP < 130/80, reduced dietary sodium <2300mg, and moderate intensity exercise >150 mins/wk  Education:  pathophysiology of pulmonary disease, preventing infections, inspiratory muscle training, environmental triggers, nebulizer use, bronchodilators, bronchial hygiene, traveling with pulmonary disease, harmonica therapy, education: Pulmonary Anatomy and Physiology, education:  Pulmonary Medications, education:  Clearing Secretions, and education: Oxygen  Progressing: Goals met: consistent BP <130/80, reduced dietary sodium <2300 mg.    Plan: Class: Understanding Heart Disease, Class: Common Heart Medications, Complete abstention from smoking, Avoid Processed foods, engage in regular exercise, eliminate salt shaker at the table, use salt substitutes, check labels for sodium content, monitor home BP, class: Pulmonary Anatomy and Physiology, class:  Pulmonary Medications, and home harmonica therapy practice  Readiness to change: Action

## 2024-05-06 ENCOUNTER — APPOINTMENT (OUTPATIENT)
Dept: PULMONOLOGY | Facility: HOSPITAL | Age: 84
End: 2024-05-06
Attending: INTERNAL MEDICINE
Payer: COMMERCIAL

## 2024-10-09 DIAGNOSIS — E78.49 OTHER HYPERLIPIDEMIA: ICD-10-CM

## 2024-10-09 DIAGNOSIS — I25.10 ASCVD (ARTERIOSCLEROTIC CARDIOVASCULAR DISEASE): ICD-10-CM

## 2024-10-09 RX ORDER — EZETIMIBE 10 MG/1
10 TABLET ORAL DAILY
Qty: 90 TABLET | Refills: 1 | Status: SHIPPED | OUTPATIENT
Start: 2024-10-09

## 2024-10-09 RX ORDER — ATORVASTATIN CALCIUM 40 MG/1
40 TABLET, FILM COATED ORAL
Qty: 90 TABLET | Refills: 1 | Status: SHIPPED | OUTPATIENT
Start: 2024-10-09

## 2024-10-09 NOTE — TELEPHONE ENCOUNTER
Fax - Atorvastatin and Ezetimibe to [facility]    Due for 6 mos f/u.  Message sent to clerical for scheduling. 1st attempt.  Will provide 90 day and refill x1 d/t provider availability.

## 2025-02-20 DIAGNOSIS — I25.10 ASCVD (ARTERIOSCLEROTIC CARDIOVASCULAR DISEASE): ICD-10-CM

## 2025-02-20 DIAGNOSIS — I10 ESSENTIAL HYPERTENSION: ICD-10-CM

## 2025-02-20 RX ORDER — CARVEDILOL 6.25 MG/1
6.25 TABLET ORAL 2 TIMES DAILY WITH MEALS
Qty: 180 TABLET | Refills: 1 | OUTPATIENT
Start: 2025-02-20

## 2025-02-20 NOTE — TELEPHONE ENCOUNTER
Reason for call:   [x] Refill   [] Prior Auth  [] Other:     Office:   [] PCP/Provider -   [x] Specialty/Provider -     Medication:   carvedilol (COREG) 6.25 mg       Dose/Frequency: Take 1 tablet (6.25 mg total) by mouth 2 (two) times a day with meals     Quantity: 180    Pharmacy: [facility] [address].        Does the patient have enough for 3 days?   [] Yes   [x] No - Send as HP to POD

## 2025-02-24 RX ORDER — CARVEDILOL 6.25 MG/1
6.25 TABLET ORAL 2 TIMES DAILY WITH MEALS
Qty: 180 TABLET | Refills: 1 | Status: SHIPPED | OUTPATIENT
Start: 2025-02-24

## 2025-02-24 NOTE — TELEPHONE ENCOUNTER
Reason for call: NOT A DUPLICATE. Pharmacy has no refill. Last refill sent 02/08/24  [x] Refill   [] Prior Auth  [] Other:     Office:   [] PCP/Provider -   [x] Specialty/Provider - Cardio    Medication:  carvedilol (COREG) 6.25 mg tablet     Dose/Frequency: Take 1 tablet (6.25 mg total) by mouth 2 (two) times a day with meals     Quantity: 180    Pharmacy: [facility] [address]    Does the patient have enough for 3 days?   [] Yes   [x] No - Send as HP to POD

## 2025-04-08 ENCOUNTER — OFFICE VISIT (OUTPATIENT)
Dept: CARDIOLOGY CLINIC | Facility: CLINIC | Age: 85
End: 2025-04-08
Payer: COMMERCIAL

## 2025-04-08 VITALS
HEART RATE: 70 BPM | DIASTOLIC BLOOD PRESSURE: 82 MMHG | WEIGHT: 233 LBS | BODY MASS INDEX: 31.6 KG/M2 | SYSTOLIC BLOOD PRESSURE: 126 MMHG

## 2025-04-08 DIAGNOSIS — I35.0 NONRHEUMATIC AORTIC VALVE STENOSIS: Primary | ICD-10-CM

## 2025-04-08 DIAGNOSIS — Z86.79 HX OF CORONARY ARTERY DISEASE: ICD-10-CM

## 2025-04-08 PROCEDURE — 99214 OFFICE O/P EST MOD 30 MIN: CPT | Performed by: PHYSICIAN ASSISTANT

## 2025-04-08 RX ORDER — ROFLUMILAST 250 UG/1
TABLET ORAL
COMMUNITY

## 2025-04-08 RX ORDER — FUROSEMIDE 20 MG/1
1 TABLET ORAL DAILY
COMMUNITY

## 2025-04-08 RX ORDER — FUROSEMIDE 20 MG/1
1 TABLET ORAL DAILY
COMMUNITY
Start: 2025-01-02

## 2025-04-08 NOTE — PROGRESS NOTES
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 Wall thickness is moderately increased. There is moderate concentric hypertrophy. The left ventricular ejection fraction is 60%. Systolic function is normal. Diastolic function is mildly abnormal, consistent with grade I (abnormal) relaxation.    The following segments are akinetic: basal inferior.    The following segments are hypokinetic: basal inferolateral.    All other segments are normal.    Aortic Valve: The aortic valve is trileaflet. The leaflets are severely calcified. There is moderately reduced mobility. There is moderate to severe stenosis. The aortic valve mean gradient is 22.0 mmHg. The dimensionless velocity index is 0.28. The aortic valve area is 0.99 cm2.    Mitral Valve: There is mild annular calcification. There is trace regurgitation.    Tricuspid Valve: There is mild regurgitation.    Echo 3/19/2024:    Left Ventricle: Left ventricular cavity size is normal. Wall thickness is mildly increased. The left ventricular ejection fraction is 60%. Systolic function is normal. Wall motion is normal. Diastolic function is mildly abnormal, consistent with grade I (abnormal) relaxation.    Left Atrium: The atrium is mildly dilated.    Aortic Valve: The aortic valve is trileaflet. The leaflets are not thickened. The leaflets are severely calcified. There is moderately reduced mobility. There is moderate stenosis.    Mitral Valve: There is mild regurgitation.    Tricuspid Valve: There is mild regurgitation.    PLAN:  lasha's dyspnea could be multifactorial from combination of ILD, CAD and/or AS. Moderate AS noted on his  last echo. DEBRA not indicated at this time.   Continue with Coreg, ASA, statin, zetia, SL NTG PRN at current doses   Takes Lasix 20mg QD for LE edema -- continue. Med list updated.   Avoid strenuous exercise -- stop if experiencing any symptoms     RTO in 6 months or sooner if needed.     Interval History/ HPI:   Austyn Arnold is an 83-year-old male with history of coronary artery disease (w/ prior inferior MI in 2008), hypertension, hyperlipidemia who is known to cardiologist Dr. See who presents for routine follow-up.      Reports of continued dyspnea which is not worsening.  Uses a cane to ambulate and otherwise functional with his ADLs.  He has no new symptoms to report today. He denies any chest pain, discomfort, palpitations, shortness of breath, orthopnea, PND. Reports of occasional LE edema which is mostly at night and better by the morning time. Per med rec this visit has been taking Lasix 20 mg daily prescribed by his PCP at [facility].  Updated.  Repeat echocardiogram shows stable AS.  Emesis and symptoms to watch out for were described with patient in detail.  Patient agreeable for conservative management at this time for his CV issues.     Vitals:  126/82  70  233 LBS    Past Medical History:   Diagnosis Date    Arthritis     Cardiac disease     heart attck 10 years ago    COPD (chronic obstructive pulmonary disease) ([facility])      Social History     Socioeconomic History    Marital status: Single     Spouse name: Not on file    Number of children: Not on file    Years of education: Not on file    Highest education level: Not on file   Occupational History    Not on file   Tobacco Use    Smoking status: Former     Types: Cigarettes    Smokeless tobacco: Never   Vaping Use    Vaping status: Never Used   Substance and Sexual Activity    Alcohol use: Not Currently     Comment: occasional    Drug use: No    Sexual activity: Not Currently   Other Topics Concern    Not on file   Social History Narrative     Not on file     Social Drivers of Health     Financial Resource Strain: Not on file   Food Insecurity: No Food Insecurity (11/10/2023)    Nursing - Inadequate Food Risk Classification     Worried About Running Out of Food in the Last Year: Never true     Ran Out of Food in the Last Year: Never true     Ran Out of Food in the Last Year: Not on file   Transportation Needs: No Transportation Needs (11/10/2023)    PRAPARE - Transportation     Lack of Transportation (Medical): No     Lack of Transportation (Non-Medical): No   Physical Activity: Not on file   Stress: Not on file   Social Connections: Not on file   Intimate Partner Violence: Not on file   Housing Stability: Low Risk  (11/10/2023)    Housing Stability Vital Sign     Unable to Pay for Housing in the Last Year: No     Number of Times Moved in the Last Year: 1     Homeless in the Last Year: No      Family History   Problem Relation Age of Onset    Colon cancer Neg Hx     Colon polyps Neg Hx      Past Surgical History:   Procedure Laterality Date    CARDIAC SURGERY      stent placement    EYE SURGERY      bilateral cataract surgery    VASCULAR SURGERY      legs       Current Outpatient Medications:     albuterol (ProAir HFA) 90 mcg/act inhaler, 6 (six) times a day, Disp: , Rfl:     aspirin (ECOTRIN LOW STRENGTH) 81 mg EC tablet, Take 1 tablet (81 mg total) by mouth daily, Disp: 90 tablet, Rfl: 1    atorvastatin (LIPITOR) 40 mg tablet, Take 1 tablet (40 mg total) by mouth daily with dinner, Disp: 90 tablet, Rfl: 1    carvedilol (COREG) 6.25 mg tablet, Take 1 tablet (6.25 mg total) by mouth 2 (two) times a day with meals, Disp: 180 tablet, Rfl: 1    ezetimibe (ZETIA) 10 mg tablet, Take 1 tablet (10 mg total) by mouth daily, Disp: 90 tablet, Rfl: 1    guaiFENesin 1200 MG TB12, Take 1 tablet (1,200 mg total) by mouth 2 (two) times a day, Disp: 10 tablet, Rfl: 0    ipratropium-albuterol (DUO-NEB) 0.5-2.5 mg/3 mL nebulizer solution, Use 1 vial via nebulizer 3 TIMES  DAILY AS DIRECTED, Disp: , Rfl:     montelukast (SINGULAIR) 10 mg tablet, Take 1 tablet (10 mg total) by mouth daily at bedtime, Disp: 30 tablet, Rfl: 0    nitroglycerin (NITROSTAT) 0.4 mg SL tablet, Place 1 tablet (0.4 mg total) under the tongue every 5 (five) minutes as needed for chest pain, Disp: 25 tablet, Rfl: 0    Trelegy Ellipta 100-62.5-25 MCG/ACT inhaler, 1 puff daily, Disp: , Rfl:       Review of Systems:  Review of Systems   Constitutional:  Positive for fatigue. Negative for appetite change, chills, diaphoresis and fever.   Respiratory:  Positive for shortness of breath. Negative for cough and chest tightness.    Cardiovascular:  Positive for leg swelling. Negative for chest pain and palpitations.   Gastrointestinal:  Negative for diarrhea, nausea and vomiting.   Endocrine: Negative for cold intolerance and heat intolerance.   Genitourinary:  Negative for difficulty urinating, dysuria and enuresis.   Musculoskeletal:  Negative for arthralgias, back pain and gait problem.   Allergic/Immunologic: Negative for environmental allergies and food allergies.   Neurological:  Negative for dizziness, facial asymmetry and headaches.   Hematological:  Negative for adenopathy. Does not bruise/bleed easily.   Psychiatric/Behavioral:  Negative for agitation, behavioral problems and confusion.          Physical Exam:  Physical Exam  Constitutional:       Appearance: He is well-developed.   HENT:      Right Ear: External ear normal.      Left Ear: External ear normal.   Eyes:      Pupils: Pupils are equal, round, and reactive to light.   Cardiovascular:      Rate and Rhythm: Normal rate and regular rhythm.      Heart sounds: Murmur heard.      No friction rub. No gallop.   Pulmonary:      Effort: Pulmonary effort is normal.      Breath sounds: Normal breath sounds.   Abdominal:      Palpations: Abdomen is soft.   Musculoskeletal:         General: Normal range of motion.      Cervical back: Normal range of motion.    Skin:     General: Skin is warm and dry.   Neurological:      Mental Status: He is alert and oriented to person, place, and time.      Deep Tendon Reflexes: Reflexes are normal and symmetric.   Psychiatric:         Behavior: Behavior normal.         Thought Content: Thought content normal.         Judgment: Judgment normal.         This note was completed in part utilizing M-Modal Fluency Direct Software.  Grammatical errors, random word insertions, spelling mistakes, and incomplete sentences can be an occasional consequence of this system secondary to software limitations, ambient noise, and hardware issues.  If you have any questions or concerns about the content, text, or information contained within the body of this dictation, please contact the provider for clarification.

## 2025-06-02 DIAGNOSIS — E78.49 OTHER HYPERLIPIDEMIA: ICD-10-CM

## 2025-06-02 DIAGNOSIS — I25.10 ASCVD (ARTERIOSCLEROTIC CARDIOVASCULAR DISEASE): ICD-10-CM

## 2025-06-02 RX ORDER — ATORVASTATIN CALCIUM 40 MG/1
40 TABLET, FILM COATED ORAL
Qty: 90 TABLET | Refills: 3 | Status: SHIPPED | OUTPATIENT
Start: 2025-06-02

## 2025-06-02 NOTE — TELEPHONE ENCOUNTER
Requested medication(s) are due for refill today: Yes  Patient has already received a courtesy refill: No  Other reason request has been forwarded to provider: Per refill pod, provider needs to approve Rx

## 2025-08-18 ENCOUNTER — TELEPHONE (OUTPATIENT)
Dept: CARDIOLOGY CLINIC | Facility: CLINIC | Age: 85
End: 2025-08-18